# Patient Record
Sex: MALE | Race: WHITE | NOT HISPANIC OR LATINO | Employment: OTHER | RURAL
[De-identification: names, ages, dates, MRNs, and addresses within clinical notes are randomized per-mention and may not be internally consistent; named-entity substitution may affect disease eponyms.]

---

## 2017-01-24 RX ORDER — HYDROCHLOROTHIAZIDE 12.5 MG/1
TABLET ORAL
Qty: 90 TABLET | Refills: 1 | Status: SHIPPED | OUTPATIENT
Start: 2017-01-24 | End: 2017-07-10 | Stop reason: SDUPTHER

## 2017-05-10 RX ORDER — CAPTOPRIL 25 MG/1
TABLET ORAL
Qty: 180 TABLET | Refills: 1 | Status: SHIPPED | OUTPATIENT
Start: 2017-05-10 | End: 2017-12-15 | Stop reason: SDUPTHER

## 2017-06-05 PROBLEM — M19.90 DEGENERATIVE JOINT DISEASE: Chronic | Status: ACTIVE | Noted: 2017-06-05

## 2017-06-05 PROBLEM — R73.01 IMPAIRED FASTING GLUCOSE: Status: ACTIVE | Noted: 2017-06-05

## 2017-06-05 PROBLEM — I10 HYPERTENSION: Chronic | Status: ACTIVE | Noted: 2017-06-05

## 2017-06-05 PROBLEM — R97.20 ELEVATED PSA: Status: ACTIVE | Noted: 2017-06-05

## 2017-06-05 PROBLEM — R33.9 URINARY RETENTION: Status: ACTIVE | Noted: 2017-06-05

## 2017-06-05 PROBLEM — Z00.00 WELLNESS EXAMINATION: Status: ACTIVE | Noted: 2017-06-05

## 2017-06-05 PROBLEM — D69.6 THROMBOCYTOPENIA (HCC): Status: ACTIVE | Noted: 2017-06-05

## 2017-06-06 ENCOUNTER — OFFICE VISIT (OUTPATIENT)
Dept: FAMILY MEDICINE CLINIC | Facility: CLINIC | Age: 72
End: 2017-06-06

## 2017-06-06 VITALS
RESPIRATION RATE: 18 BRPM | WEIGHT: 206 LBS | HEIGHT: 74 IN | HEART RATE: 86 BPM | BODY MASS INDEX: 26.44 KG/M2 | TEMPERATURE: 98 F | SYSTOLIC BLOOD PRESSURE: 126 MMHG | DIASTOLIC BLOOD PRESSURE: 82 MMHG | OXYGEN SATURATION: 95 %

## 2017-06-06 DIAGNOSIS — M19.90 OSTEOARTHRITIS, UNSPECIFIED OSTEOARTHRITIS TYPE, UNSPECIFIED SITE: Chronic | ICD-10-CM

## 2017-06-06 DIAGNOSIS — R73.01 IMPAIRED FASTING GLUCOSE: ICD-10-CM

## 2017-06-06 DIAGNOSIS — I10 ESSENTIAL HYPERTENSION: Primary | Chronic | ICD-10-CM

## 2017-06-06 DIAGNOSIS — R33.9 URINARY RETENTION: ICD-10-CM

## 2017-06-06 PROCEDURE — 99213 OFFICE O/P EST LOW 20 MIN: CPT | Performed by: FAMILY MEDICINE

## 2017-06-06 RX ORDER — FINASTERIDE 5 MG/1
5 TABLET, FILM COATED ORAL DAILY
COMMUNITY
End: 2017-06-22 | Stop reason: SDUPTHER

## 2017-06-06 NOTE — PROGRESS NOTES
Subjective   Elver Gordon is a 72 y.o. male presenting with chief complaint of:   Chief Complaint   Patient presents with   • Hypertension   • Thrombocytopenia   • Elevated PSA       History of Present Illness :  Alone.  Has multiple chronic problems; interval appointment.  One of these problems is HTN.      The HTN has been present for years/it is chronic.  The HTN is assumed essential/without testing needed to look for other.  The HTN is controlled manifest by todays blood pressure and no home monitoring.   Degenerative Joint Disease:  This has been present for years/over a year.  It is chronic.  It causes on/off pain and joint stiffness .  There is no joint swelling.  Elevated fasting glucose: This has been present for years/over a year.  It is chronic.  It is controlled. No home accuchecks have yet been requested.  No signs hypoglycmeia manifest as syncope/near syncope or diaphoretic/sweating spisodes.   Prostatism: This has been present for years/over a year. It is chronic.  With proscar Rx he is voiding ok.  Sees urology/Zuniga.     The following portions of the patient's history were reviewed and updated as appropriate: allergies, current medications, past family history, past medical history, past social history, past surgical history and problem list.  TCC migrated if needed/reviewed.      Current Outpatient Prescriptions:   •  captopril (CAPOTEN) 25 MG tablet, Take 1 tablet by mouth  twice a day, Disp: 180 tablet, Rfl: 1  •  finasteride (PROSCAR) 5 MG tablet, Take 5 mg by mouth Daily., Disp: , Rfl:   •  hydrochlorothiazide (HYDRODIURIL) 12.5 MG tablet, Take 1 tablet by mouth  daily, Disp: 90 tablet, Rfl: 1    No Known Allergies    Review of Systems  GENERAL:  Active/slower with limits, speed, samni for age and; a lot of outdoor work. Sleep is ok; apnea denied.  ENDO:  No syncope, near or diaphoretic sweaty spells.  HEENT: No head injury or headache,  No vision change; glasses and eye exam up to date,  No  "hearing loss.  Ears without pain/drainage.  No sore throat.  No pattern nasal/sinus congestion/drainage. No epistaxis.  Occ washes his ears out.   CHEST: No chest wall tenderness or mass. No cough,  without wheeze, SOB; no hemoptysis.  CV: No chest pain, palpatations, ankle edema.  GI: No heartburn, dysphagia.  No abdominal pain, diarrhea, constipation, rectal bleeding, or melena.    Colonoscopy+polyp/[facility]/1.9.13/5y    :  Voids without dysuria, or incontience to completion.  Sees Zuniga.   ORTHO: No painful/swollen joints but various on /off sore.  Bilateral total knees.   No  sore neck or back.  No acute neck or back pain without recent injury.   NEURO: No dizziness, weakness of extremities.  No numbness/parethesias.   PSYCH: No memory loss.  Mood good; not anxious, depressed but/and not suicidal.  Tolerated stress.     No results found for this or any previous visit.    No results found for: HGBA1C    No results found for: NA, K, CL, CO2, GLUCOSE, BUN, CREATININE, CALCIUM, EGFRCLEARA    No results found for: PSA     Objective   /82 (BP Location: Left arm, Patient Position: Sitting, Cuff Size: Adult)  Pulse 86  Temp 98 °F (36.7 °C) (Oral)   Resp 18  Ht 73.5\" (186.7 cm)  Wt 206 lb (93.4 kg)  SpO2 95%  BMI 26.81 kg/m2    Physical Exam  GENERAL:  Well nourished/developed in no acute distress. Thin  SKIN: Turgor excellent, without wound, rash, lesion.  HEENT: Normal cephalic without trauma.  Pupils equal round reactive to light. Extraocular motions full without nystagmus.   External canals nonobstructive nontender without reddness. Tymphatic membranes annie with coby structures intact.   Oral cavity without growths, exudates, and moist.  Posterior pharnyx without mass, obstruction, reddness.  No thyroidmegaly, mass, tenderness, lymphadenopathy and supple.  CV: Regular rhythm.  No murmur, gallop,  edema. Posterior pulses intact.  No carotid bruits.  CHEST: No chest wall tenderness or mass.   LUNGS: " Symmetric motion with clear to auscultation.   ABD: Soft, nontender without mass.   ORTHO: Symmetric extremities without swelling/point tenderness.  Full gross range of motion.  NEURO: CN 2-12 grossly intact.  Symmetric facies. 1/4 x bicep knee equal reflexes.  UE/LE   4/5 strength throughout.  Nonfocal use extremities. Speech clear. Intact light touch with monofilament, vibratory sensation with tuning fork; equal toes/distal feet.    PSYCH: Oriented x 3.  Pleasant calm, well kept.  Purposeful/directed conservation with intact short/long gross memory.     Assessment/Plan     1. Essential hypertension  controlled  - CBC & Differential  - Comprehensive Metabolic Panel  - Lipid Panel  - TSH    2. Impaired fasting glucose  watched  - CBC & Differential  - Comprehensive Metabolic Panel  - Lipid Panel  - TSH    3. Osteoarthritis, unspecified osteoarthritis type, unspecified site  Mild;diffuse  - CBC & Differential  - Comprehensive Metabolic Panel    4. Urinary retention  proscar   - PSA      Rx: reviewed.  Changes if above; same  LAB: reviewed/above, and if orders: today/yearly  Wrap-up/other instructions  Regular cardio exercise something everyone should consider and try to do; discussed  Normal weight a goal for everyone; discussed  Healthy diet helpful for weight management, illness prevention; discussed  If over 50-screening exams include men PSA/rectal exam, women mammograms, and  everyone colonoscopy screening for colon cancer.   There are no Patient Instructions on file for this visit.    Follow up: Return for lab today; 12m ro.

## 2017-06-07 LAB
ALBUMIN SERPL-MCNC: 4.2 G/DL (ref 3.5–5)
ALBUMIN/GLOB SERPL: 1.5 G/DL (ref 1.1–2.5)
ALP SERPL-CCNC: 62 U/L (ref 24–120)
ALT SERPL-CCNC: 28 U/L (ref 0–54)
AST SERPL-CCNC: 25 U/L (ref 7–45)
BASOPHILS # BLD AUTO: 0.02 10*3/MM3 (ref 0–0.2)
BASOPHILS NFR BLD AUTO: 0.2 % (ref 0–2)
BILIRUB SERPL-MCNC: 0.7 MG/DL (ref 0.1–1)
BUN SERPL-MCNC: 17 MG/DL (ref 5–21)
BUN/CREAT SERPL: 16.3 (ref 7–25)
CALCIUM SERPL-MCNC: 10.1 MG/DL (ref 8.4–10.4)
CHLORIDE SERPL-SCNC: 100 MMOL/L (ref 98–110)
CHOLEST SERPL-MCNC: 157 MG/DL (ref 130–200)
CO2 SERPL-SCNC: 26 MMOL/L (ref 24–31)
CREAT SERPL-MCNC: 1.04 MG/DL (ref 0.5–1.4)
EOSINOPHIL # BLD AUTO: 0.1 10*3/MM3 (ref 0–0.7)
EOSINOPHIL NFR BLD AUTO: 1.1 % (ref 0–4)
ERYTHROCYTE [DISTWIDTH] IN BLOOD BY AUTOMATED COUNT: 13.7 % (ref 12–15)
GLOBULIN SER CALC-MCNC: 2.8 GM/DL
GLUCOSE SERPL-MCNC: 106 MG/DL (ref 70–100)
HCT VFR BLD AUTO: 45.6 % (ref 40–52)
HDLC SERPL-MCNC: 43 MG/DL
HGB BLD-MCNC: 14.9 G/DL (ref 14–18)
IMM GRANULOCYTES # BLD: 0.02 10*3/MM3 (ref 0–0.03)
IMM GRANULOCYTES NFR BLD: 0.2 % (ref 0–5)
LDLC SERPL CALC-MCNC: 100 MG/DL (ref 0–99)
LYMPHOCYTES # BLD AUTO: 1.4 10*3/MM3 (ref 0.72–4.86)
LYMPHOCYTES NFR BLD AUTO: 15.2 % (ref 15–45)
MCH RBC QN AUTO: 29.3 PG (ref 28–32)
MCHC RBC AUTO-ENTMCNC: 32.7 G/DL (ref 33–36)
MCV RBC AUTO: 89.6 FL (ref 82–95)
MONOCYTES # BLD AUTO: 0.77 10*3/MM3 (ref 0.19–1.3)
MONOCYTES NFR BLD AUTO: 8.3 % (ref 4–12)
NEUTROPHILS # BLD AUTO: 6.92 10*3/MM3 (ref 1.87–8.4)
NEUTROPHILS NFR BLD AUTO: 75 % (ref 39–78)
PLATELET # BLD AUTO: 201 10*3/MM3 (ref 130–400)
POTASSIUM SERPL-SCNC: 4.1 MMOL/L (ref 3.5–5.3)
PROT SERPL-MCNC: 7 G/DL (ref 6.3–8.7)
PSA SERPL-MCNC: 2.41 NG/ML (ref 0–4)
RBC # BLD AUTO: 5.09 10*6/MM3 (ref 4.8–5.9)
SODIUM SERPL-SCNC: 137 MMOL/L (ref 135–145)
TRIGL SERPL-MCNC: 71 MG/DL (ref 0–149)
TSH SERPL DL<=0.005 MIU/L-ACNC: 2.26 MIU/ML (ref 0.47–4.68)
VLDLC SERPL CALC-MCNC: 14.2 MG/DL
WBC # BLD AUTO: 9.23 10*3/MM3 (ref 4.8–10.8)

## 2017-06-22 ENCOUNTER — OFFICE VISIT (OUTPATIENT)
Dept: UROLOGY | Facility: CLINIC | Age: 72
End: 2017-06-22

## 2017-06-22 VITALS
HEIGHT: 73 IN | SYSTOLIC BLOOD PRESSURE: 134 MMHG | DIASTOLIC BLOOD PRESSURE: 82 MMHG | BODY MASS INDEX: 27.2 KG/M2 | WEIGHT: 205.2 LBS | TEMPERATURE: 98.1 F

## 2017-06-22 DIAGNOSIS — R97.20 ELEVATED PROSTATE SPECIFIC ANTIGEN (PSA): Primary | ICD-10-CM

## 2017-06-22 DIAGNOSIS — N40.1 BPH (BENIGN PROSTATIC HYPERTROPHY) WITH URINARY OBSTRUCTION: ICD-10-CM

## 2017-06-22 DIAGNOSIS — N13.8 BPH (BENIGN PROSTATIC HYPERTROPHY) WITH URINARY OBSTRUCTION: ICD-10-CM

## 2017-06-22 LAB
BILIRUB BLD-MCNC: NEGATIVE MG/DL
CLARITY, POC: CLEAR
COLOR UR: YELLOW
GLUCOSE UR STRIP-MCNC: NEGATIVE MG/DL
KETONES UR QL: NEGATIVE
LEUKOCYTE EST, POC: ABNORMAL
NITRITE UR-MCNC: NEGATIVE MG/ML
PH UR: 5 [PH] (ref 5–8)
PROT UR STRIP-MCNC: NEGATIVE MG/DL
RBC # UR STRIP: NEGATIVE /UL
SP GR UR: 1.02 (ref 1–1.03)
UROBILINOGEN UR QL: NORMAL

## 2017-06-22 PROCEDURE — 99213 OFFICE O/P EST LOW 20 MIN: CPT | Performed by: UROLOGY

## 2017-06-22 PROCEDURE — 81003 URINALYSIS AUTO W/O SCOPE: CPT | Performed by: UROLOGY

## 2017-06-22 RX ORDER — FINASTERIDE 5 MG/1
5 TABLET, FILM COATED ORAL DAILY
Qty: 90 TABLET | Refills: 3 | Status: SHIPPED | OUTPATIENT
Start: 2017-06-22 | End: 2018-04-27 | Stop reason: SDUPTHER

## 2017-06-22 NOTE — PROGRESS NOTES
Subjective    Mr. Gordon is 72 y.o. male    Chief Complaint: Elevated PSA    History of Present Illness     Elevated PSA  Patient is here with an elevated PSA. The PSA was drawn1 week(s). He does not have a family history of prostate cancer. His AUA Symptom Score is 3 /35. Voiding symptoms include Incomplete emptying, Intermittency and Weakened stream. Denies Frequency, Urgency, Straining and Nocturia. Voiding symptoms began several years  . These have been gradual in onset. Negative X 2 showed inflammation, last PSA 7.3.  Previous PSA values are :   Lab Results   Component Value Date    PSA 2.410 06/06/2017        Benign Prostatic Hypertrophy  Patient complains of lower urinary tract symptoms. He reports incomplete emptying, intermittency and weak stream. He denies frequency, nocturia two times a night, straining and urgency. Patient states symptoms are of mild severity. Onset of symptoms was several years ago and was gradual in onset. His AUA Symptom Score is, 3/35.He reports a history of no complicating symptoms. He denies flank pain, gross hematuria, kidney stones and recurrent UTI.  Patient has tried 5 alpha reductase inhibitors with improvement. Last PSA was 2.4 .            The following portions of the patient's history were reviewed and updated as appropriate: allergies, current medications, past family history, past medical history, past social history, past surgical history and problem list.    Review of Systems   Constitutional: Negative for chills and fever.   Gastrointestinal: Negative for abdominal pain, anal bleeding and blood in stool.   Genitourinary: Negative for flank pain and hematuria.         Current Outpatient Prescriptions:   •  captopril (CAPOTEN) 25 MG tablet, Take 1 tablet by mouth  twice a day, Disp: 180 tablet, Rfl: 1  •  finasteride (PROSCAR) 5 MG tablet, Take 1 tablet by mouth Daily., Disp: 90 tablet, Rfl: 3  •  hydrochlorothiazide (HYDRODIURIL) 12.5 MG tablet, Take 1 tablet by mouth   "daily, Disp: 90 tablet, Rfl: 1    Past Medical History:   Diagnosis Date   • Elevated PSA    • Hypertension        Past Surgical History:   Procedure Laterality Date   • CHOLECYSTECTOMY     • KNEE SURGERY         Social History     Social History   • Marital status:      Spouse name: N/A   • Number of children: N/A   • Years of education: N/A     Social History Main Topics   • Smoking status: Former Smoker     Packs/day: 1.50     Years: 15.00     Types: Cigarettes     Start date: 1962     Quit date: 6/6/1987   • Smokeless tobacco: Never Used   • Alcohol use No   • Drug use: No   • Sexual activity: Defer     Other Topics Concern   • None     Social History Narrative       Family History   Problem Relation Age of Onset   • No Known Problems Father    • No Known Problems Mother        Objective    /82  Temp 98.1 °F (36.7 °C)  Ht 73\" (185.4 cm)  Wt 205 lb 3.2 oz (93.1 kg)  BMI 27.07 kg/m2    Physical Exam   Constitutional: He is oriented to person, place, and time. He appears well-developed and well-nourished.   Pulmonary/Chest: Effort normal.   Abdominal: Soft. He exhibits no distension and no mass. There is no tenderness. There is no rebound and no guarding. No hernia.   Genitourinary: Penis normal. Rectal exam shows no mass, no tenderness and anal tone normal. Enlarged: for the age of the patient. Right testis shows no mass, no swelling and no tenderness. Left testis shows no mass, no swelling and no tenderness. No hypospadias. No discharge found.   Genitourinary Comments:  The urethral meatus normal in position without evidence of stricture. Epididymis without mass or tenderness. Vas Deferens is palpably normal.Anus and perineum without mass or tenderness. The prostate is approximately 35 ml. It is Symmetric, with a Soft consistency. There are no nodules present. . The seminal vesicles are Not palpable due to the size of the prostate.     Neurological: He is alert and oriented to person, place, and " time.   Vitals reviewed.          Results for orders placed or performed in visit on 06/22/17   POC Urinalysis Dipstick, Automated   Result Value Ref Range    Color Yellow Yellow, Straw, Dark Yellow, Ladan    Clarity, UA Clear Clear    Glucose, UA Negative Negative, 1000 mg/dL (3+) mg/dL    Bilirubin Negative Negative    Ketones, UA Negative Negative    Specific Gravity  1.020 1.005 - 1.030    Blood, UA Negative Negative    pH, Urine 5.0 5.0 - 8.0    Protein, POC Negative Negative mg/dL    Urobilinogen, UA Normal Normal    Leukocytes Trace (A) Negative    Nitrite, UA Negative Negative     Assessment and Plan    Elver was seen today for elevated psa.    Diagnoses and all orders for this visit:    Elevated prostate specific antigen (PSA)  -     POC Urinalysis Dipstick, Automated  -     PSA, Total & Free; Future    BPH (benign prostatic hypertrophy) with urinary obstruction  -     finasteride (PROSCAR) 5 MG tablet; Take 1 tablet by mouth Daily.          Patient with a history of 2 negative biopsies both showed inflammation.  His last biopsy was for a PSA of 7.2.  I am seeing him 1 year ago his PSA was 3.  Is down to 2.4.  Corrected for finasteride it is 4.8.  He has somewhat of a zigzag pattern to his PSA we will continue yearly follow-up with PSA and percent free.

## 2017-07-10 RX ORDER — HYDROCHLOROTHIAZIDE 12.5 MG/1
TABLET ORAL
Qty: 90 TABLET | Refills: 1 | Status: SHIPPED | OUTPATIENT
Start: 2017-07-10 | End: 2017-12-15 | Stop reason: SDUPTHER

## 2017-07-20 ENCOUNTER — TELEPHONE (OUTPATIENT)
Dept: FAMILY MEDICINE CLINIC | Facility: CLINIC | Age: 72
End: 2017-07-20

## 2017-07-20 ENCOUNTER — OFFICE VISIT (OUTPATIENT)
Dept: FAMILY MEDICINE CLINIC | Facility: CLINIC | Age: 72
End: 2017-07-20

## 2017-07-20 VITALS
BODY MASS INDEX: 27.17 KG/M2 | HEIGHT: 73 IN | SYSTOLIC BLOOD PRESSURE: 120 MMHG | OXYGEN SATURATION: 97 % | DIASTOLIC BLOOD PRESSURE: 92 MMHG | HEART RATE: 98 BPM | RESPIRATION RATE: 18 BRPM | WEIGHT: 205 LBS | TEMPERATURE: 98.4 F

## 2017-07-20 DIAGNOSIS — R42 VERTIGO: ICD-10-CM

## 2017-07-20 DIAGNOSIS — I10 ESSENTIAL HYPERTENSION: Chronic | ICD-10-CM

## 2017-07-20 DIAGNOSIS — R73.01 IMPAIRED FASTING GLUCOSE: Primary | ICD-10-CM

## 2017-07-20 PROCEDURE — 99213 OFFICE O/P EST LOW 20 MIN: CPT | Performed by: FAMILY MEDICINE

## 2017-07-20 NOTE — TELEPHONE ENCOUNTER
Pt called and states he has been having really bad dizzy spells when he is getting out of a chair and would like seen [phone]/ [phone]

## 2017-07-26 ENCOUNTER — TELEPHONE (OUTPATIENT)
Dept: FAMILY MEDICINE CLINIC | Facility: CLINIC | Age: 72
End: 2017-07-26

## 2017-07-26 PROBLEM — F40.240 CLAUSTROPHOBIA: Status: ACTIVE | Noted: 2017-07-26

## 2017-07-26 RX ORDER — ALPRAZOLAM 0.5 MG/1
TABLET ORAL
Qty: 2 TABLET | Refills: 0 | OUTPATIENT
Start: 2017-07-26 | End: 2017-08-29

## 2017-07-26 NOTE — TELEPHONE ENCOUNTER
"Vm \"I has a MRI scheduled this morning and I was in it for 15 minutes and had to get out, I am going to have to get something to take like Valium? They said I have to be in there for 40 min with contrast and 40 min without contrast. I just cant do it without taking something\"  "

## 2017-08-02 ENCOUNTER — TELEPHONE (OUTPATIENT)
Dept: FAMILY MEDICINE CLINIC | Facility: CLINIC | Age: 72
End: 2017-08-02

## 2017-08-02 ENCOUNTER — HOSPITAL ENCOUNTER (OUTPATIENT)
Dept: MRI IMAGING | Facility: HOSPITAL | Age: 72
Discharge: HOME | End: 2017-08-02
Attending: FAMILY MEDICINE | Admitting: FAMILY MEDICINE

## 2017-08-02 LAB — CREAT BLDA-MCNC: 1.1 MG/DL (ref 0.6–1.3)

## 2017-08-02 PROCEDURE — 70553 MRI BRAIN STEM W/O & W/DYE: CPT

## 2017-08-02 PROCEDURE — 82565 ASSAY OF CREATININE: CPT

## 2017-08-02 PROCEDURE — A9577 INJ MULTIHANCE: HCPCS

## 2017-08-02 PROCEDURE — 0 GADOBENATE DIMEGLUMINE 529 MG/ML SOLUTION

## 2017-08-29 ENCOUNTER — PROCEDURE VISIT (OUTPATIENT)
Dept: OTOLARYNGOLOGY | Facility: CLINIC | Age: 72
End: 2017-08-29

## 2017-08-29 ENCOUNTER — OFFICE VISIT (OUTPATIENT)
Dept: OTOLARYNGOLOGY | Facility: CLINIC | Age: 72
End: 2017-08-29

## 2017-08-29 VITALS
DIASTOLIC BLOOD PRESSURE: 85 MMHG | BODY MASS INDEX: 27.51 KG/M2 | HEIGHT: 73 IN | HEART RATE: 55 BPM | TEMPERATURE: 96.8 F | SYSTOLIC BLOOD PRESSURE: 142 MMHG | WEIGHT: 207.6 LBS | RESPIRATION RATE: 16 BRPM

## 2017-08-29 DIAGNOSIS — R42 VERTIGO: ICD-10-CM

## 2017-08-29 DIAGNOSIS — H90.5 SNHL (SENSORINEURAL HEARING LOSS): Primary | ICD-10-CM

## 2017-08-29 DIAGNOSIS — H61.23 BILATERAL IMPACTED CERUMEN: ICD-10-CM

## 2017-08-29 DIAGNOSIS — H81.10 BPPV (BENIGN PAROXYSMAL POSITIONAL VERTIGO), UNSPECIFIED LATERALITY: ICD-10-CM

## 2017-08-29 PROCEDURE — 99203 OFFICE O/P NEW LOW 30 MIN: CPT | Performed by: NURSE PRACTITIONER

## 2017-08-29 PROCEDURE — 69210 REMOVE IMPACTED EAR WAX UNI: CPT | Performed by: NURSE PRACTITIONER

## 2017-08-29 PROCEDURE — 92550 TYMPANOMETRY & REFLEX THRESH: CPT | Performed by: AUDIOLOGIST

## 2017-08-29 NOTE — PATIENT INSTRUCTIONS
Call for problems or worsening symptoms    Cerumen was removed    Avoid loud noise exposure. Protect hearing in with extreme loud noise.   Recommend fan, sound machine, white noise from TV for tinnitus masking. Avoid excessive caffeine, decrease stress level, monitor BP regularly, routine sleep schedule. Low Sodium Diet.   Tinnitus Handout   Hearing loss handout   Appt with hearing aid specialist for hearing aid fitting when patient so desires.   Call for change or worsening symptoms not controlled by current treatment regimen.

## 2017-08-29 NOTE — PROGRESS NOTES
Procedure Note    Preprocedure Diagnosis:  Cerumen Impaction    Postprocedure Diagnosis:  Cerumen Impaction      PROCEDURE NOTE    PROCEDURE:cerumen removal .     ANESTHESIA:None     REASON FOR THE PROCEDURE:The patient presented with cerumen impaction .   The patient verbally consented to intervention after a full discussion of risks, benefits, and alternatives. No guarantees were made or implied.    DETAILS OF THE PROCEDURE:The patient was seated upright in the exam room chair. The open head otoscope was used to visualize the ear canal and tympanic membrane. Cerumen was then removed from the both ears using a suction

## 2017-08-29 NOTE — PROGRESS NOTES
YOB: 1945  Location: [facility]  Location Address: [address] [place] [address],  [address], [address] [place] [address]  Location Phone: [phone]    Chief Complaint   Patient presents with   • Dizziness       History of Present Illness  Elver Gordon is a 72 y.o. male.  Elver Gordon is here for evaluation of ENT complaints. The patient has had problems with vertigo, dizziness and decreased hearing  The symptoms are not localized to a particular location. The patient has had improving symptoms. The symptoms have been present for the last several weeks The symptoms are aggravated by  no identifiable factors. The symptoms are improved by cawthorne excercises.   Hx of  loud noise exposure.  He has had some problems with cerumen impaction.  He had an episode of vertigo that improved with dizziness exercises which sounded like BPPV.  He washes his ears out at home.      Procedure visit     2017  [facility]    Viola Hicks CCC-EMI   Audiology    Vertigo   Dx    Dizziness   Reason for visit    Progress Notes         CASE HISTORY DETAILS   Mr. Gordon presented to the clinic this date with complaints of dizziness. He reported onset of symptoms several weeks ago. The dizziness was described as a brief spinning sensation when turning in bed and standing quickly. He was given Michaels-Daroff exercises by his PCP and noted dizziness has resolved. He further reported left ear pain. He noted history of cerumen occlusion in the left ear and history of ear infection if he gets water in his ears. He also noted decreased hearing bilaterally, worse in the left ear. He has history of noise exposure and noted more exposure on the left side.       SUMMARY   RIGHT  ¨ Otoscopy revealed clear EAC/Unremarkable TM.  ¨ Severe high frequency sensorineural hearing loss.  ¨ Immitance measures are consistent with normal middle ear function.     LEFT  ¨ Otoscopy revealed non-occluding cerumen/abnormal  TM.  ¨ Profound high frequency sensorineural hearing loss.  ¨ Immitance measures are consistent with negative middle ear pressure.     A [place] Hallpike was not performed. Pt symptoms were consistent with BPPV and per report seem to have resolved with successful canalith repositioning.     RECOMMENDATIONS   Results of today's evaluation were discussed with Mr. Gordon and the following recommendations were made:  1. ENT evaluation today as scheduled.  2. Monitor hearing yearly.  3. HAE if so desired.  4. RTC for further evaluation should dizziness return and not be resolved by Michaels-Daroff exercises.     AUDIOGRAM AND IMMITANCE        Espinoza Patel, [facility]  Audiologist             Past Medical History:   Diagnosis Date   • Elevated PSA    • Hypertension        Past Surgical History:   Procedure Laterality Date   • CHOLECYSTECTOMY     • KNEE SURGERY           Current Outpatient Prescriptions:   •  captopril (CAPOTEN) 25 MG tablet, Take 1 tablet by mouth  twice a day, Disp: 180 tablet, Rfl: 1  •  finasteride (PROSCAR) 5 MG tablet, Take 1 tablet by mouth Daily., Disp: 90 tablet, Rfl: 3  •  hydrochlorothiazide (HYDRODIURIL) 12.5 MG tablet, Take 1 tablet by mouth  daily, Disp: 90 tablet, Rfl: 1    Review of patient's allergies indicates no known allergies.    Family History   Problem Relation Age of Onset   • No Known Problems Father    • No Known Problems Mother        Social History     Social History   • Marital status:      Spouse name: Arlin   • Number of children: 3   • Years of education: 13.5     Occupational History   • retired      [employer]     Social History Main Topics   • Smoking status: Former Smoker     Packs/day: 1.50     Years: 15.00     Types: Cigarettes     Start date: 1962     Quit date: 6/6/1987   • Smokeless tobacco: Never Used   • Alcohol use No   • Drug use: No   • Sexual activity: Defer     Other Topics Concern   • Not on file     Social History Narrative       Review of  Systems   Constitutional: Negative.    HENT:        SEE HPI   Eyes: Negative.    Respiratory: Negative.    Cardiovascular: Negative.    Gastrointestinal: Negative.    Endocrine: Negative.    Genitourinary: Negative.    Musculoskeletal: Negative.    Skin: Negative.    Allergic/Immunologic: Negative.    Neurological: Negative.    Hematological: Negative.    Psychiatric/Behavioral: Negative.        Vitals:    08/29/17 1023   BP: 142/85   Pulse: 55   Resp: 16   Temp: 96.8 °F (36 °C)       Objective     Physical Exam  CONSTITUTIONAL: well nourished, alert, oriented, in no acute distress     COMMUNICATION AND VOICE: able to communicate normally, normal voice quality    HEAD: normocephalic, no lesions, atraumatic, no tenderness, no masses     FACE: appearance normal, no lesions, no tenderness, no deformities, facial motion symmetric    SALIVARY GLANDS: parotid glands with no tenderness, no swelling, no masses, submandibular glands with normal size, nontender    EYES: ocular motility normal, eyelids normal, orbits normal, no proptosis, conjunctiva normal , pupils equal, round     EARS:  Hearing: response to conversational voice normal bilaterally   External Ears: auricles without lesions  Otoscopic: tympanic membrane appearance normal, no lesions, no perforation, normal mobility, no fluid Cerumen in EACs bilaterally - removed with suction    NOSE:  External Nose: structure normal, no tenderness on palpation, no nasal discharge, no lesions, no evidence of trauma, nostrils patent   Intranasal Exam: nasal mucosa normal, vestibule within normal limits, inferior turbinate normal, nasal septum midline       ORAL:  Lips: upper and lower lips without lesion   Teeth: dentition within normal limits for age   Gums: gingivae healthy   Oral Mucosa: oral mucosa normal, no mucosal lesions   Floor of Mouth: Warthin’s duct patent, mucosa normal  Tongue: lingual mucosa normal without lesions, normal tongue mobility   Palate: soft and hard  palates with normal mucosa and structure  Oropharynx: oropharyngeal mucosa normal    NECK: neck appearance normal, no mass,  noted without erythema or tenderness    THYROID: no overt thyromegaly, no tenderness, nodules or mass present on palpation, position midline     LYMPH NODES: no lymphadenopathy    CHEST/RESPIRATORY: respiratory effort normal,    CARDIOVASCULAR:  extremities without cyanosis or edema      NEUROLOGIC/PSYCHIATRIC: oriented to time, place and person, mood normal, affect appropriate, CN II-XII intact grossly    Assessment/Plan   Elver was seen today for dizziness.    Diagnoses and all orders for this visit:    SNHL (sensorineural hearing loss)    Bilateral impacted cerumen    BPPV (benign paroxysmal positional vertigo), unspecified laterality      * Surgery not found *  No orders of the defined types were placed in this encounter.    Return if symptoms worsen or fail to improve.       Patient Instructions   Call for problems or worsening symptoms    Cerumen was removed    Avoid loud noise exposure. Protect hearing in with extreme loud noise.   Recommend fan, sound machine, white noise from TV for tinnitus masking. Avoid excessive caffeine, decrease stress level, monitor BP regularly, routine sleep schedule. Low Sodium Diet.   Tinnitus Handout   Hearing loss handout   Appt with hearing aid specialist for hearing aid fitting when patient so desires.   Call for change or worsening symptoms not controlled by current treatment regimen.

## 2017-08-29 NOTE — PROGRESS NOTES
CASE HISTORY DETAILS   Mr. Gordon presented to the clinic this date with complaints of dizziness. He reported onset of symptoms several weeks ago. The dizziness was described as a brief spinning sensation when turning in bed and standing quickly. He was given Michaels-Daroff exercises by his PCP and noted dizziness has resolved. He further reported left ear pain. He noted history of cerumen occlusion in the left ear and history of ear infection if he gets water in his ears. He also noted decreased hearing bilaterally, worse in the left ear. He has history of noise exposure and noted more exposure on the left side.      SUMMARY   RIGHT  · Otoscopy revealed clear EAC/Unremarkable TM.  · Severe high frequency sensorineural hearing loss.  · Immitance measures are consistent with normal middle ear function.    LEFT  · Otoscopy revealed non-occluding cerumen/abnormal TM.  · Profound high frequency sensorineural hearing loss.  · Immitance measures are consistent with negative middle ear pressure.    A [place] Hallpike was not performed. Pt symptoms were consistent with BPPV and per report seem to have resolved with successful canalith repositioning.    RECOMMENDATIONS   Results of today's evaluation were discussed with Mr. Gordon and the following recommendations were made:  1. ENT evaluation today as scheduled.  2. Monitor hearing yearly.  3. HAE if so desired.  4. RTC for further evaluation should dizziness return and not be resolved by Michaels-Daroff exercises.    AUDIOGRAM AND IMMITANCE       Espinoza Patel, CCC-A  Audiologist

## 2017-12-15 RX ORDER — HYDROCHLOROTHIAZIDE 12.5 MG/1
TABLET ORAL
Qty: 90 TABLET | Refills: 2 | Status: SHIPPED | OUTPATIENT
Start: 2017-12-15 | End: 2018-09-27 | Stop reason: SDUPTHER

## 2017-12-15 RX ORDER — CAPTOPRIL 25 MG/1
TABLET ORAL
Qty: 180 TABLET | Refills: 2 | Status: SHIPPED | OUTPATIENT
Start: 2017-12-15 | End: 2018-09-27 | Stop reason: SDUPTHER

## 2018-02-07 ENCOUNTER — OFFICE VISIT (OUTPATIENT)
Dept: GASTROENTEROLOGY | Facility: CLINIC | Age: 73
End: 2018-02-07

## 2018-02-07 VITALS
SYSTOLIC BLOOD PRESSURE: 128 MMHG | OXYGEN SATURATION: 97 % | BODY MASS INDEX: 26.69 KG/M2 | HEART RATE: 115 BPM | HEIGHT: 74 IN | WEIGHT: 208 LBS | DIASTOLIC BLOOD PRESSURE: 82 MMHG

## 2018-02-07 DIAGNOSIS — I10 ESSENTIAL HYPERTENSION: ICD-10-CM

## 2018-02-07 DIAGNOSIS — Z86.010 HX OF ADENOMATOUS COLONIC POLYPS: Primary | ICD-10-CM

## 2018-02-07 PROCEDURE — S0260 H&P FOR SURGERY: HCPCS | Performed by: CLINICAL NURSE SPECIALIST

## 2018-02-07 NOTE — PROGRESS NOTES
Elver Gordon  1945 2/7/2018  Chief Complaint   Patient presents with   • Colonoscopy     Subjective   HPI  Elver Gordon is a 72 y.o. male who presents as a referral for preventative maintenance. He has no complaints of nausea or vomiting. No change in bowels. No wt loss. No BRBPR. No melena. There is no family hx for colon cancer. No abdominal pain.  Past Medical History:   Diagnosis Date   • Elevated PSA    • Hx of colonic polyps    • Hypertension    • Hypertriglyceridemia      Past Surgical History:   Procedure Laterality Date   • CHOLECYSTECTOMY     • COLONOSCOPY W/ POLYPECTOMY  01/09/2013    2 Tubular adenomas at 45cm and 70 cm repeat exam in 5 years   • KNEE SURGERY       Outpatient Prescriptions Marked as Taking for the 2/7/18 encounter (Office Visit) with DARLIN Boucher   Medication Sig Dispense Refill   • captopril (CAPOTEN) 25 MG tablet TAKE 1 TABLET BY MOUTH  TWICE A  tablet 2   • finasteride (PROSCAR) 5 MG tablet Take 1 tablet by mouth Daily. 90 tablet 3   • hydrochlorothiazide (HYDRODIURIL) 12.5 MG tablet TAKE 1 TABLET BY MOUTH  DAILY 90 tablet 2     No Known Allergies  Social History     Social History   • Marital status:      Spouse name: Arlin   • Number of children: 3   • Years of education: 13.5     Occupational History   • retired      [employer]     Social History Main Topics   • Smoking status: Former Smoker     Packs/day: 1.50     Years: 15.00     Types: Cigarettes     Start date: 1962     Quit date: 6/6/1987   • Smokeless tobacco: Never Used   • Alcohol use No   • Drug use: No   • Sexual activity: Defer     Other Topics Concern   • Not on file     Social History Narrative     Family History   Problem Relation Age of Onset   • No Known Problems Father    • No Known Problems Mother    • Colon polyps Brother    • Colon cancer Neg Hx      Health Maintenance   Topic Date Due   • TDAP/TD VACCINES (1 - Tdap) 05/02/1964   • PNEUMOCOCCAL VACCINES (65+  "LOW/MEDIUM RISK) (2 of 2 - PPSV23) 05/02/2010   • HEPATITIS C SCREENING  06/05/2017   • MEDICARE ANNUAL WELLNESS  06/05/2017   • AAA SCREEN (ONE-TIME)  06/06/2017   • LIPID PANEL  06/06/2018   • COLONOSCOPY  01/09/2023   • INFLUENZA VACCINE  Completed   • ZOSTER VACCINE  Completed       REVIEW OF SYSTEMS  General: well appearing, no fever chills or sweats, no unexplained wt loss  HEENT: no acute visual or hearing disturbances  Cardiovascular: No chest pain or palpitations  Pulmonary: No shortness of breath, coughing, wheezing or hemoptysis  : No burning, urgency, hematuria, or dysuria  Musculoskeletal: No joint pain or stiffness  Peripheral: no edema  Skin: No lesions or rashes  Neuro: No dizziness, headaches, stroke, syncope  Endocrine: No hot or cold intolerances  Hematological: No blood dyscrasias    Objective   Vitals:    02/07/18 1235   BP: 128/82   Pulse: 115   SpO2: 97%   Weight: 94.3 kg (208 lb)   Height: 186.7 cm (73.5\")     Body mass index is 27.07 kg/(m^2).  Patient's BMI is within normal parameters. No follow-up required.      PHYSICAL EXAM  General: age appropriate well nourished well appearing, no acute distress  Head: normocephalic and atraumatic  Global assessment-supple  Neck-No JVD noted, no lymphadenopathy  Pulmonary-clear to auscultation bilaterally, normal respiratory effort  Cardiovascular-normal rate and rhythm, normal heart sounds, S1 and S2 noted  Abdomen-soft, non tender, non distended, normal bowel sounds all 4 quadrants, no hepatosplenomegaly noted  Extremities-No clubbing cyanosis or edema  Neuro-Non focal, converses appropriately, awake, alert, oriented    Assessment/Plan     Elver was seen today for colonoscopy.    Diagnoses and all orders for this visit:    Hx of adenomatous colonic polyps  Comments:  Colonoscopy 1/10/2013 tubular adenoma 45cm; hyperplastic 70cm fragment of tubular adenoma  Orders:  -     polyethylene glycol (GoLYTELY) 236 g solution; Take as directed by office " instructions.  -     Case Request; Standing  -     Implement Anesthesia Orders Day of Procedure; Standing  -     Obtain Informed Consent; Standing  -     Verify bowel prep was successful; Standing  -     Case Request    Essential hypertension  Comments:  cont BP medication the day of procedure      Problem List Items Addressed This Visit        Cardiovascular and Mediastinum    Hypertension (Chronic)       Other    Hx of adenomatous colonic polyps - Primary    Overview     Colonoscopy 1/10/2013 tubular adenoma 45cm; hyperplastic 70cm fragment of tubular adenoma         Relevant Medications    polyethylene glycol (GoLYTELY) 236 g solution    Other Relevant Orders    Case Request (Completed)            COLONOSCOPY WITH ANESTHESIA (N/A)  Body mass index is 27.07 kg/(m^2).    Patient instructions on prep prior to procedure provided to the patient.    All risks, benefits, alternatives, and indications of colonoscopy procedure have been discussed with the patient. Risks to include perforation of the colon requiring possible surgery or colostomy, risk of bleeding from biopsies or removal of colon tissue, possibility of missing a colon polyp or cancer, or adverse drug reaction.  Benefits to include the diagnosis and management of disease of the colon and rectum. Alternatives to include barium enema, radiographic evaluation, lab testing or no intervention. Pt verbalizes understanding and agrees.     Fide Garcia, APRN  2/7/2018  12:51 PM

## 2018-04-02 ENCOUNTER — OUTSIDE FACILITY SERVICE (OUTPATIENT)
Dept: GASTROENTEROLOGY | Facility: CLINIC | Age: 73
End: 2018-04-02

## 2018-04-02 PROCEDURE — 45385 COLONOSCOPY W/LESION REMOVAL: CPT | Performed by: INTERNAL MEDICINE

## 2018-04-02 PROCEDURE — 88305 TISSUE EXAM BY PATHOLOGIST: CPT | Performed by: INTERNAL MEDICINE

## 2018-04-03 ENCOUNTER — LAB REQUISITION (OUTPATIENT)
Dept: LAB | Facility: HOSPITAL | Age: 73
End: 2018-04-03

## 2018-04-03 DIAGNOSIS — Z00.00 ENCOUNTER FOR GENERAL ADULT MEDICAL EXAMINATION WITHOUT ABNORMAL FINDINGS: ICD-10-CM

## 2018-04-04 LAB
CYTO UR: NORMAL
LAB AP CASE REPORT: NORMAL
LAB AP CLINICAL INFORMATION: NORMAL
Lab: NORMAL
PATH REPORT.FINAL DX SPEC: NORMAL
PATH REPORT.GROSS SPEC: NORMAL

## 2018-04-27 DIAGNOSIS — N40.1 BENIGN PROSTATIC HYPERPLASIA WITH URINARY OBSTRUCTION: ICD-10-CM

## 2018-04-27 DIAGNOSIS — N13.8 BENIGN PROSTATIC HYPERPLASIA WITH URINARY OBSTRUCTION: ICD-10-CM

## 2018-04-27 RX ORDER — FINASTERIDE 5 MG/1
5 TABLET, FILM COATED ORAL DAILY
Qty: 90 TABLET | Refills: 0 | Status: SHIPPED | OUTPATIENT
Start: 2018-04-27 | End: 2018-06-28 | Stop reason: SDUPTHER

## 2018-05-21 DIAGNOSIS — R97.20 ELEVATED PROSTATE SPECIFIC ANTIGEN (PSA): ICD-10-CM

## 2018-06-18 DIAGNOSIS — R42 VERTIGO: ICD-10-CM

## 2018-06-18 DIAGNOSIS — M19.90 OSTEOARTHRITIS, UNSPECIFIED OSTEOARTHRITIS TYPE, UNSPECIFIED SITE: Chronic | ICD-10-CM

## 2018-06-18 DIAGNOSIS — R73.01 IMPAIRED FASTING GLUCOSE: ICD-10-CM

## 2018-06-18 DIAGNOSIS — Z86.010 HX OF ADENOMATOUS COLONIC POLYPS: ICD-10-CM

## 2018-06-18 DIAGNOSIS — Z01.89 LABORATORY TEST: ICD-10-CM

## 2018-06-18 DIAGNOSIS — F40.240 CLAUSTROPHOBIA: ICD-10-CM

## 2018-06-18 DIAGNOSIS — I10 ESSENTIAL HYPERTENSION: Primary | Chronic | ICD-10-CM

## 2018-06-18 DIAGNOSIS — Z00.00 WELLNESS EXAMINATION: ICD-10-CM

## 2018-06-18 DIAGNOSIS — E55.9 VITAMIN D DEFICIENCY: ICD-10-CM

## 2018-06-18 DIAGNOSIS — D69.6 THROMBOCYTOPENIA (HCC): ICD-10-CM

## 2018-06-18 DIAGNOSIS — R97.20 ELEVATED PSA: ICD-10-CM

## 2018-06-19 ENCOUNTER — OFFICE VISIT (OUTPATIENT)
Dept: FAMILY MEDICINE CLINIC | Facility: CLINIC | Age: 73
End: 2018-06-19

## 2018-06-19 VITALS
BODY MASS INDEX: 27.3 KG/M2 | SYSTOLIC BLOOD PRESSURE: 128 MMHG | WEIGHT: 206 LBS | TEMPERATURE: 98 F | HEART RATE: 82 BPM | RESPIRATION RATE: 18 BRPM | DIASTOLIC BLOOD PRESSURE: 88 MMHG | HEIGHT: 73 IN | OXYGEN SATURATION: 97 %

## 2018-06-19 DIAGNOSIS — I10 ESSENTIAL HYPERTENSION: Chronic | ICD-10-CM

## 2018-06-19 DIAGNOSIS — R73.01 IMPAIRED FASTING GLUCOSE: ICD-10-CM

## 2018-06-19 DIAGNOSIS — R97.20 ELEVATED PSA: ICD-10-CM

## 2018-06-19 DIAGNOSIS — M19.90 OSTEOARTHRITIS, UNSPECIFIED OSTEOARTHRITIS TYPE, UNSPECIFIED SITE: Primary | Chronic | ICD-10-CM

## 2018-06-19 DIAGNOSIS — Z23 IMMUNIZATION DUE: ICD-10-CM

## 2018-06-19 LAB
25(OH)D3+25(OH)D2 SERPL-MCNC: 30.7 NG/ML (ref 30–100)
ALBUMIN SERPL-MCNC: 4.1 G/DL (ref 3.5–5)
ALBUMIN/GLOB SERPL: 1.5 G/DL (ref 1.1–2.5)
ALP SERPL-CCNC: 58 U/L (ref 24–120)
ALT SERPL-CCNC: 32 U/L (ref 0–54)
AST SERPL-CCNC: 24 U/L (ref 7–45)
BASOPHILS # BLD AUTO: 0.03 10*3/MM3 (ref 0–0.2)
BASOPHILS NFR BLD AUTO: 0.4 % (ref 0–2)
BILIRUB SERPL-MCNC: 0.6 MG/DL (ref 0.1–1)
BUN SERPL-MCNC: 19 MG/DL (ref 5–21)
BUN/CREAT SERPL: 18.4 (ref 7–25)
CALCIUM SERPL-MCNC: 9.4 MG/DL (ref 8.4–10.4)
CHLORIDE SERPL-SCNC: 100 MMOL/L (ref 98–110)
CHOLEST SERPL-MCNC: 162 MG/DL (ref 130–200)
CO2 SERPL-SCNC: 27 MMOL/L (ref 24–31)
CREAT SERPL-MCNC: 1.03 MG/DL (ref 0.5–1.4)
EOSINOPHIL # BLD AUTO: 0.1 10*3/MM3 (ref 0–0.7)
EOSINOPHIL NFR BLD AUTO: 1.2 % (ref 0–4)
ERYTHROCYTE [DISTWIDTH] IN BLOOD BY AUTOMATED COUNT: 13.7 % (ref 12–15)
GFR SERPLBLD CREATININE-BSD FMLA CKD-EPI: 71 ML/MIN/1.73
GFR SERPLBLD CREATININE-BSD FMLA CKD-EPI: 86 ML/MIN/1.73
GLOBULIN SER CALC-MCNC: 2.7 GM/DL
GLUCOSE SERPL-MCNC: 110 MG/DL (ref 70–100)
HBA1C MFR BLD: 5.7 %
HCT VFR BLD AUTO: 46.5 % (ref 40–52)
HCV AB S/CO SERPL IA: <0.1 S/CO RATIO (ref 0–0.9)
HDLC SERPL-MCNC: 35 MG/DL
HGB BLD-MCNC: 15.1 G/DL (ref 14–18)
IMM GRANULOCYTES # BLD: 0.05 10*3/MM3 (ref 0–0.03)
IMM GRANULOCYTES NFR BLD: 0.6 % (ref 0–5)
LDLC SERPL CALC-MCNC: 111 MG/DL (ref 0–99)
LYMPHOCYTES # BLD AUTO: 1.75 10*3/MM3 (ref 0.72–4.86)
LYMPHOCYTES NFR BLD AUTO: 21.1 % (ref 15–45)
MCH RBC QN AUTO: 28 PG (ref 28–32)
MCHC RBC AUTO-ENTMCNC: 32.5 G/DL (ref 33–36)
MCV RBC AUTO: 86.1 FL (ref 82–95)
MONOCYTES # BLD AUTO: 0.64 10*3/MM3 (ref 0.19–1.3)
MONOCYTES NFR BLD AUTO: 7.7 % (ref 4–12)
NEUTROPHILS # BLD AUTO: 5.74 10*3/MM3 (ref 1.87–8.4)
NEUTROPHILS NFR BLD AUTO: 69 % (ref 39–78)
NRBC BLD AUTO-RTO: 0 /100 WBC (ref 0–0)
PLATELET # BLD AUTO: 189 10*3/MM3 (ref 130–400)
POTASSIUM SERPL-SCNC: 4.1 MMOL/L (ref 3.5–5.3)
PROT SERPL-MCNC: 6.8 G/DL (ref 6.3–8.7)
RBC # BLD AUTO: 5.4 10*6/MM3 (ref 4.8–5.9)
SODIUM SERPL-SCNC: 140 MMOL/L (ref 135–145)
TRIGL SERPL-MCNC: 78 MG/DL (ref 0–149)
TSH SERPL DL<=0.005 MIU/L-ACNC: 3.38 MIU/ML (ref 0.47–4.68)
VLDLC SERPL CALC-MCNC: 15.6 MG/DL
WBC # BLD AUTO: 8.31 10*3/MM3 (ref 4.8–10.8)

## 2018-06-19 PROCEDURE — 95115 IMMUNOTHERAPY ONE INJECTION: CPT | Performed by: FAMILY MEDICINE

## 2018-06-19 PROCEDURE — 99213 OFFICE O/P EST LOW 20 MIN: CPT | Performed by: FAMILY MEDICINE

## 2018-06-19 NOTE — PATIENT INSTRUCTIONS
There are several vaccines used for individuals near/over 65 years old.    1. Tetanus.   Like anyone this needs to given every 10 years; sooner for/with lacerations/wounds.   Likely when getting this booster it needs to be a tetanus called Tdap (tetanus mixed with diptheria and pertussis).   Years ago you had this vaccine.  We now know we can lose our immunity to pertussis (a part of this vaccine) and run a risk of catching this.  Now only would this make us ill; but more importantly we can spread this to very young children (and for them it can be a much more dangerous illness).   We call this the grandparent vaccine for this reason.     2. Pneumonia.   This comes now with two brands.   It is recommended to take pneumovax first; and a year later take the cousin prevnar.  Even if you have had these before; we need to review when and your current health situation/s as you may need boosters.     3. Shingles.  You do not want to catch shingles.  Though you will recover from this; the pain associated with shingles can be severe.  Even if you have had the now older zostavax, or have had shingles; it is recommended you still get the Shingrix (the new just available early 2018 shingles vaccine).   See information on Shingrix also on your handout.     4. Yearly flu vaccine given from September through April each year.     5. Travel vaccines:  If you are one to do international travel; be sure and ask us for any particular unusual vaccines you may need.     6.  Miscellaneous:  If you have certain health situations/disease you may need specific/particular vaccines not give to the general public.     A new shingles vaccine (a shot to lower your chance of catching shingles) is now available (shingrix).  This vaccine is the second vaccine created for this purpose; we have had zostavax for years.  Shingrix provides a much better and longer immunity for shingles than zostavax.  For this and other reasons Shingrix can be started at  age 50.  If you have had zostavax in the past; you can still take Shingrix.      This vaccine is not paid for in a doctor's office by medicare, medicaid and probably most insurances.  Like zostavax; this is covered in drug stores.  This is a vaccine that if you chose to get you need to get at a drug store that gives vaccines (like [facility], [facility] and [facility].      If you get this vaccine; please let us know so we can record this on your record here.

## 2018-06-19 NOTE — PROGRESS NOTES
Subjective   Elver Gordon is a 73 y.o. male presenting with chief complaint of:   Chief Complaint   Patient presents with   • Hypertension       History of Present Illness :  Alone.   Has multiple chronic problems to consider that might have a bearing on today's issues;  an interval appointment.       Chronic/acute problems to review today:   1. Osteoarthritis, unspecified osteoarthritis type, unspecified site: chronic; diffuse occ joints like hip, knee; but no joint swelling and no significant limitations daily activities.    2. Essential hypertension: chronic; rare home check ok like todays.     3. Impaired fasting glucose: chronic; patient aware and low sweet diet; physicially active.    4. Elevated PSA-astudillo: chronic/benign to this point.  Has f/u   5. Immunization due: chronic/variable will change with age; behind on pneumovax.      Has an/another acute issue today: .    The following portions of the patient's history were reviewed and updated as appropriate: allergies, current medications, past family history, past medical history, past social history, past surgical history and problem list.      Current Outpatient Prescriptions:   •  captopril (CAPOTEN) 25 MG tablet, TAKE 1 TABLET BY MOUTH  TWICE A DAY, Disp: 180 tablet, Rfl: 2  •  finasteride (PROSCAR) 5 MG tablet, TAKE 1 TABLET BY MOUTH  DAILY, Disp: 90 tablet, Rfl: 0  •  hydrochlorothiazide (HYDRODIURIL) 12.5 MG tablet, TAKE 1 TABLET BY MOUTH  DAILY, Disp: 90 tablet, Rfl: 2  No current facility-administered medications for this visit.     No problems with medications.  Refills if needed done    No Known Allergies    Review of Systems  GENERAL:  Active/slower with limits, speed, stamina for age and vertigo. Sleep is ok. No fever now.  ENDO:  No syncope, near or diaphoretic sweaty spells.  HEENT: No head injury or headache,  No vision change,  Same mild tinnitis/hearing loss.  Ears without pain/drainage.  No sore throat.  No significant nasal/sinus  congestion/drainage. No epistaxis.  CHEST: No chest wall tenderness or mass. No significant cough, without wheeze, SOB; no hemoptysis.  CV: No chest pain, palpitations, ankle edema.  GI: No heartburn, dysphagia.  No abdominal pain, diarrhea, constipation, rectal bleeding, or melena.    :  Voids without dysuria, or incontinence to completion.  ORTHO: No painful/swollen joints but various on /off sore.  No sore neck or back.  No acute neck or back pain without recent injury.   NEURO: No dizziness, weakness of extremities.  No numbness/paresthesias.   PSYCH: No memory loss.  Mood good; not anxious, depressed but/and not suicidal.  Tolerated stress.      Screening:  Mammogram:NA  Bone density: NA  Low dose CT chest: NA  Colonoscopy+polyp/MMH//1.9.13/5y  Colon-polyp//4.2.2018/5y       Results for orders placed or performed in visit on 06/18/18   Hepatitis C antibody   Result Value Ref Range    Hep C Virus Ab <0.1 0.0 - 0.9 s/co ratio   Comprehensive metabolic panel   Result Value Ref Range    Glucose 110 (H) 70 - 100 mg/dL    BUN 19 5 - 21 mg/dL    Creatinine 1.03 0.50 - 1.40 mg/dL    eGFR Non African Am 71 >60 mL/min/1.73    eGFR African Am 86 >60 mL/min/1.73    BUN/Creatinine Ratio 18.4 7.0 - 25.0    Sodium 140 135 - 145 mmol/L    Potassium 4.1 3.5 - 5.3 mmol/L    Chloride 100 98 - 110 mmol/L    Total CO2 27.0 24.0 - 31.0 mmol/L    Calcium 9.4 8.4 - 10.4 mg/dL    Total Protein 6.8 6.3 - 8.7 g/dL    Albumin 4.10 3.50 - 5.00 g/dL    Globulin 2.7 gm/dL    A/G Ratio 1.5 1.1 - 2.5 g/dL    Total Bilirubin 0.6 0.1 - 1.0 mg/dL    Alkaline Phosphatase 58 24 - 120 U/L    AST (SGOT) 24 7 - 45 U/L    ALT (SGPT) 32 0 - 54 U/L   Lipid panel   Result Value Ref Range    Total Cholesterol 162 130 - 200 mg/dL    Triglycerides 78 0 - 149 mg/dL    HDL Cholesterol 35 (L) >=40 mg/dL    VLDL Cholesterol 15.6 mg/dL    LDL Cholesterol  111 (H) 0 - 99 mg/dL   TSH   Result Value Ref Range    TSH 3.380 0.470 - 4.680 mIU/mL   Hemoglobin A1c    Result Value Ref Range    Hemoglobin A1C 5.70 %   Vitamin D 25 Hydroxy   Result Value Ref Range    25 Hydroxy, Vitamin D 30.7 30.0 - 100.0 ng/ml   CBC and Differential   Result Value Ref Range    WBC 8.31 4.80 - 10.80 10*3/mm3    RBC 5.40 4.80 - 5.90 10*6/mm3    Hemoglobin 15.1 14.0 - 18.0 g/dL    Hematocrit 46.5 40.0 - 52.0 %    MCV 86.1 82.0 - 95.0 fL    MCH 28.0 28.0 - 32.0 pg    MCHC 32.5 (L) 33.0 - 36.0 g/dL    RDW 13.7 12.0 - 15.0 %    Platelets 189 130 - 400 10*3/mm3    Neutrophil Rel % 69.0 39.0 - 78.0 %    Lymphocyte Rel % 21.1 15.0 - 45.0 %    Monocyte Rel % 7.7 4.0 - 12.0 %    Eosinophil Rel % 1.2 0.0 - 4.0 %    Basophil Rel % 0.4 0.0 - 2.0 %    Neutrophils Absolute 5.74 1.87 - 8.40 10*3/mm3    Lymphocytes Absolute 1.75 0.72 - 4.86 10*3/mm3    Monocytes Absolute 0.64 0.19 - 1.30 10*3/mm3    Eosinophils Absolute 0.10 0.00 - 0.70 10*3/mm3    Basophils Absolute 0.03 0.00 - 0.20 10*3/mm3    Immature Granulocyte Rel % 0.6 0.0 - 5.0 %    Immature Grans Absolute 0.05 (H) 0.00 - 0.03 10*3/mm3    nRBC 0.0 0.0 - 0.0 /100 WBC       Lab Results   Component Value Date    PSA 2.410 06/06/2017        Lab Results:  CBC:    Lab Results - Last 18 Months  Lab Units 06/18/18  0830 06/06/17  1018   WBC 10*3/mm3  --  9.23   HEMOGLOBIN g/dL 15.1 14.9   HEMATOCRIT % 46.5 45.6   PLATELETS 10*3/mm3 189 201      BMP/CMP:    Lab Results - Last 18 Months  Lab Units 06/18/18  0830 08/02/17  0755 06/06/17  1018   SODIUM mmol/L 140  --  137   POTASSIUM mmol/L 4.1  --  4.1   CHLORIDE mmol/L 100  --  100   TOTAL CO2, ARTERIAL mmol/L 27.0  --  26.0   GLUCOSE mg/dL 110*  --  106*   BUN mg/dL 19  --  17   CREATININE mg/dL 1.03 1.10 1.04   EGFR IF NONAFRICN AM mL/min/1.73 71  --  70   EGFR IF AFRICN AM mL/min/1.73 86  --  85   CALCIUM mg/dL 9.4  --  10.1     HEPATIC:    Lab Results - Last 18 Months  Lab Units 06/18/18  0830 06/06/17  1018   ALT (SGPT) U/L 32 28   AST (SGOT) U/L 24 25   ALK PHOS U/L 58 62     THYROID:    Lab Results - Last  "18 Months  Lab Units 06/18/18  0830 06/06/17  1018   TSH mIU/mL 3.380 2.260     A1C:    Lab Results - Last 18 Months  Lab Units 06/18/18  0830   HEMOGLOBIN A1C % 5.70     PSA:    Lab Results - Last 18 Months  Lab Units 06/06/17  1018   PSA ng/mL 2.410       Objective   /88 (BP Location: Left arm, Patient Position: Sitting, Cuff Size: Large Adult)   Pulse 82   Temp 98 °F (36.7 °C) (Oral)   Resp 18   Ht 185.4 cm (73\")   Wt 93.4 kg (206 lb)   SpO2 97%   BMI 27.18 kg/m²   Body mass index is 27.18 kg/m².    Physical Exam  GENERAL:  Well nourished/developed in no acute distress.  SKIN: Turgor excellent, without wound, rash, lesion.  HEENT: Normal cephalic without trauma.  Pupils equal round reactive to light. Extraocular motions full without nystagmus.   External canals nonobstructive nontender without reddness. Tymphatic membranes annie with coby structures intact.   Oral cavity without growths, exudates, and moist.  Posterior pharynx without mass, obstruction, redness.  No thyromegaly, mass, tenderness, lymphadenopathy and supple.  CV: Regular rhythm.  No murmur, gallop,  edema. Posterior pulses intact.  No carotid bruits.  CHEST: No chest wall tenderness or mass.   LUNGS: Symmetric motion with clear to auscultation.  No dullness to percussion  ABD: Soft, nontender without mass.   ORTHO: Symmetric extremities without swelling/point tenderness.  Full gross range of motion..  NEURO: CN 2-12 grossly intact.  Symmetric facies. 1/4 x bicep knee ankle equal reflexes.  UE/LE   3-4/5 strength throughout.  Nonfocal use extremities. Speech clear.  Finger/nose and tandom walking ok.  Turning head; causes room to spin.     PSYCH: Oriented x 3.  Pleasant calm, well kept.  Purposeful/directed conservation with intact short/long gross memory.     Assessment/Plan     1. Osteoarthritis, unspecified osteoarthritis type, unspecified site    2. Essential hypertension    3. Impaired fasting glucose    4. Elevated PSA-astudillo  "   5. Immunization due        Rx: reviewed/changes:  same    LAB/Testing/Referrals: reviewed/orders:   Today: none  No orders of the defined types were placed in this encounter.    Usual:   any labs urology/other MD wants  6m BMP, A1c  12m CBC, CMP, A1c,  LIPID, TSH, Vit D, PSAs    Discussions:   below  Body mass index is 27.18 kg/m².   Patient's Body mass index is 27.18 kg/m². BMI is within normal parameters. No follow-up required.  Non-smoker    Patient Instructions   There are several vaccines used for individuals near/over 65 years old.    1. Tetanus.   Like anyone this needs to given every 10 years; sooner for/with lacerations/wounds.   Likely when getting this booster it needs to be a tetanus called Tdap (tetanus mixed with diptheria and pertussis).   Years ago you had this vaccine.  We now know we can lose our immunity to pertussis (a part of this vaccine) and run a risk of catching this.  Now only would this make us ill; but more importantly we can spread this to very young children (and for them it can be a much more dangerous illness).   We call this the grandparent vaccine for this reason.     2. Pneumonia.   This comes now with two brands.   It is recommended to take pneumovax first; and a year later take the cousin prevnar.  Even if you have had these before; we need to review when and your current health situation/s as you may need boosters.     3. Shingles.  You do not want to catch shingles.  Though you will recover from this; the pain associated with shingles can be severe.  Even if you have had the now older zostavax, or have had shingles; it is recommended you still get the Shingrix (the new just available early 2018 shingles vaccine).   See information on Shingrix also on your handout.     4. Yearly flu vaccine given from September through April each year.     5. Travel vaccines:  If you are one to do international travel; be sure and ask us for any particular unusual vaccines you may need.     6.   Miscellaneous:  If you have certain health situations/disease you may need specific/particular vaccines not give to the general public.     A new shingles vaccine (a shot to lower your chance of catching shingles) is now available (shingrix).  This vaccine is the second vaccine created for this purpose; we have had zostavax for years.  Shingrix provides a much better and longer immunity for shingles than zostavax.  For this and other reasons Shingrix can be started at age 50.  If you have had zostavax in the past; you can still take Shingrix.      This vaccine is not paid for in a doctor's office by medicare, medicaid and probably most insurances.  Like zostavax; this is covered in drug stores.  This is a vaccine that if you chose to get you need to get at a drug store that gives vaccines (like [facility], [facility] and [facility].      If you get this vaccine; please let us know so we can record this on your record here.           Follow up: Return for lab 6m: , lab during/or just before next apt;, Dr Lorenzo-, 12m;.  Future Appointments  Date Time Provider Department Center   6/22/2018 9:10 AM Kwaku Zuniga MD [facility] None   12/18/2018 8:15 [facility] None   [facility]2019 [facility]   6/25/2019 4:00 PM Andre Lorenzo MD [facility] None

## 2018-06-23 LAB
PSA FREE MFR SERPL: 28.1 %
PSA FREE SERPL-MCNC: 0.73 NG/ML
PSA SERPL-MCNC: 2.6 NG/ML (ref 0–4)

## 2018-06-26 NOTE — PROGRESS NOTES
Subjective    Mr. Gordon is 73 y.o. male    Chief Complaint: Elevated PSA    History of Present Illness    Elevated PSA  Patient is here with an elevated PSA. The PSA was drawn1 week(s). He does not have a family history of prostate cancer. His AUA Symptom Score is 3 /35. Voiding symptoms include Incomplete emptying, Intermittency and Weakened stream. Denies Frequency, Urgency, Straining and Nocturia. Voiding symptoms began several years  . These have been gradual in onset. Negative X 2 showed inflammation, last PSA 7.3.  Previous PSA values are :         Lab Results   Component Value Date    PSA 2.6 06/23/2018     PSA 2.410 06/06/2017         Benign Prostatic Hypertrophy  Patient complains of lower urinary tract symptoms. He reports incomplete emptying, intermittency and weak stream. He denies frequency, nocturia two times a night, straining and urgency. Patient states symptoms are of mild severity. Onset of symptoms was several years ago and was gradual in onset. His AUA Symptom Score is, 3/35.He reports a history of no complicating symptoms. He denies flank pain, gross hematuria, kidney stones and recurrent UTI.  Patient has tried 5 alpha reductase inhibitors with improvement. Last PSA was 2.4 .       The following portions of the patient's history were reviewed and updated as appropriate: allergies, current medications, past family history, past medical history, past social history, past surgical history and problem list.    Review of Systems   Constitutional: Negative for chills and fever.   Gastrointestinal: Negative for abdominal pain, anal bleeding and blood in stool.   Genitourinary: Negative for difficulty urinating, flank pain, frequency, hematuria and urgency.         Current Outpatient Prescriptions:   •  captopril (CAPOTEN) 25 MG tablet, TAKE 1 TABLET BY MOUTH  TWICE A DAY, Disp: 180 tablet, Rfl: 2  •  esomeprazole (nexIUM) 20 MG capsule, Take 20 mg by mouth Every Morning Before Breakfast., Disp: ,  "Rfl:   •  finasteride (PROSCAR) 5 MG tablet, Take 1 tablet by mouth Daily., Disp: 90 tablet, Rfl: 3  •  hydrochlorothiazide (HYDRODIURIL) 12.5 MG tablet, TAKE 1 TABLET BY MOUTH  DAILY, Disp: 90 tablet, Rfl: 2    Past Medical History:   Diagnosis Date   • Elevated PSA    • Hx of colonic polyps    • Hypertension    • Hypertriglyceridemia        Past Surgical History:   Procedure Laterality Date   • CHOLECYSTECTOMY     • COLONOSCOPY W/ POLYPECTOMY  01/09/2013    2 Tubular adenomas at 45cm and 70 cm repeat exam in 5 years   • KNEE SURGERY         Social History     Social History   • Marital status:      Spouse name: Arlin   • Number of children: 3   • Years of education: 13.5     Occupational History   • retired      [employer]     Social History Main Topics   • Smoking status: Former Smoker     Packs/day: 1.50     Years: 15.00     Types: Cigarettes     Start date: 1962     Quit date: 6/6/1987   • Smokeless tobacco: Never Used   • Alcohol use No   • Drug use: No   • Sexual activity: Defer     Other Topics Concern   • Not on file       Family History   Problem Relation Age of Onset   • No Known Problems Father    • No Known Problems Mother    • Colon polyps Brother    • Colon cancer Neg Hx        Objective    Temp 97.9 °F (36.6 °C)   Ht 186.7 cm (73.5\")   Wt 94.4 kg (208 lb 3.2 oz)   BMI 27.10 kg/m²     Physical Exam   Constitutional: He is oriented to person, place, and time. He appears well-developed and well-nourished.   Pulmonary/Chest: Effort normal.   Abdominal: Soft. He exhibits no distension and no mass. There is no tenderness. There is no rebound and no guarding. No hernia.   Genitourinary: Penis normal. Rectal exam shows no mass, no tenderness and anal tone normal. Enlarged: for the age of the patient. Right testis shows no mass, no swelling and no tenderness. Left testis shows no mass, no swelling and no tenderness. No hypospadias. No discharge found.   Genitourinary Comments:  The urethral " meatus normal in position without evidence of stricture. Epididymis without mass or tenderness. Vas Deferens is palpably normal.Anus and perineum without mass or tenderness. The prostate is approximately 35 ml. It is Symmetric, with a Soft consistency. There are no nodules present. . The seminal vesicles are Not palpable due to the size of the prostate.     Neurological: He is alert and oriented to person, place, and time.   Vitals reviewed.          Results for orders placed or performed in visit on 06/28/18   POC Urinalysis Dipstick, Multipro   Result Value Ref Range    Color Yellow Yellow, Straw, Dark Yellow, Ladan    Clarity, UA Clear Clear    Glucose, UA Negative Negative, 1000 mg/dL (3+) mg/dL    Bilirubin Negative Negative    Ketones, UA Negative Negative    Specific Gravity  1.015 1.005 - 1.030    Blood, UA Negative Negative    pH, Urine 6.0 5.0 - 8.0    Protein, POC Negative Negative mg/dL    Urobilinogen, UA Normal Normal    Nitrite, UA Negative Negative    Leukocytes Trace (A) Negative     Assessment and Plan    Elver was seen today for elevated psa.    Diagnoses and all orders for this visit:    Benign prostatic hyperplasia with urinary obstruction  -     POC Urinalysis Dipstick, Multipro  -     finasteride (PROSCAR) 5 MG tablet; Take 1 tablet by mouth Daily.    Elevated prostate specific antigen (PSA)  -     PSA, Total & Free; Future          Patient with a history of 2 negative biopsies both showed inflammation.  His last biopsy was for a PSA of 7.2.   He has somewhat of a zigzag pattern to his PSA.  His PSA today is 2.6 corrected for finasteride 5.2 free 28%.  We will continue yearly follow-up with PSA and percent free.

## 2018-06-28 ENCOUNTER — OFFICE VISIT (OUTPATIENT)
Dept: UROLOGY | Facility: CLINIC | Age: 73
End: 2018-06-28

## 2018-06-28 VITALS — HEIGHT: 74 IN | TEMPERATURE: 97.9 F | WEIGHT: 208.2 LBS | BODY MASS INDEX: 26.72 KG/M2

## 2018-06-28 DIAGNOSIS — N13.8 BENIGN PROSTATIC HYPERPLASIA WITH URINARY OBSTRUCTION: Primary | ICD-10-CM

## 2018-06-28 DIAGNOSIS — R97.20 ELEVATED PROSTATE SPECIFIC ANTIGEN (PSA): ICD-10-CM

## 2018-06-28 DIAGNOSIS — N40.1 BENIGN PROSTATIC HYPERPLASIA WITH URINARY OBSTRUCTION: Primary | ICD-10-CM

## 2018-06-28 LAB
BILIRUB BLD-MCNC: NEGATIVE MG/DL
CLARITY, POC: CLEAR
COLOR UR: YELLOW
GLUCOSE UR STRIP-MCNC: NEGATIVE MG/DL
KETONES UR QL: NEGATIVE
LEUKOCYTE EST, POC: ABNORMAL
NITRITE UR-MCNC: NEGATIVE MG/ML
PH UR: 6 [PH] (ref 5–8)
PROT UR STRIP-MCNC: NEGATIVE MG/DL
RBC # UR STRIP: NEGATIVE /UL
SP GR UR: 1.01 (ref 1–1.03)
UROBILINOGEN UR QL: NORMAL

## 2018-06-28 PROCEDURE — 81001 URINALYSIS AUTO W/SCOPE: CPT | Performed by: UROLOGY

## 2018-06-28 PROCEDURE — 99213 OFFICE O/P EST LOW 20 MIN: CPT | Performed by: UROLOGY

## 2018-06-28 RX ORDER — FINASTERIDE 5 MG/1
5 TABLET, FILM COATED ORAL DAILY
Qty: 90 TABLET | Refills: 3 | Status: SHIPPED | OUTPATIENT
Start: 2018-06-28 | End: 2019-07-03 | Stop reason: SDUPTHER

## 2018-09-27 RX ORDER — HYDROCHLOROTHIAZIDE 12.5 MG/1
TABLET ORAL
Qty: 90 TABLET | Refills: 1 | Status: SHIPPED | OUTPATIENT
Start: 2018-09-27 | End: 2019-03-25 | Stop reason: SDUPTHER

## 2018-09-27 RX ORDER — CAPTOPRIL 25 MG/1
TABLET ORAL
Qty: 180 TABLET | Refills: 1 | Status: SHIPPED | OUTPATIENT
Start: 2018-09-27 | End: 2019-03-25 | Stop reason: SDUPTHER

## 2018-12-17 DIAGNOSIS — I10 ESSENTIAL HYPERTENSION: Primary | Chronic | ICD-10-CM

## 2018-12-17 DIAGNOSIS — R73.01 IMPAIRED FASTING GLUCOSE: ICD-10-CM

## 2018-12-18 ENCOUNTER — RESULTS ENCOUNTER (OUTPATIENT)
Dept: FAMILY MEDICINE CLINIC | Facility: CLINIC | Age: 73
End: 2018-12-18

## 2018-12-18 DIAGNOSIS — R73.01 IMPAIRED FASTING GLUCOSE: ICD-10-CM

## 2018-12-18 DIAGNOSIS — I10 ESSENTIAL HYPERTENSION: Chronic | ICD-10-CM

## 2019-03-25 RX ORDER — HYDROCHLOROTHIAZIDE 12.5 MG/1
TABLET ORAL
Qty: 90 TABLET | Refills: 3 | Status: SHIPPED | OUTPATIENT
Start: 2019-03-25 | End: 2020-05-08

## 2019-03-25 RX ORDER — CAPTOPRIL 25 MG/1
TABLET ORAL
Qty: 180 TABLET | Refills: 3 | Status: SHIPPED | OUTPATIENT
Start: 2019-03-25 | End: 2020-05-08

## 2019-06-27 ENCOUNTER — TELEPHONE (OUTPATIENT)
Dept: UROLOGY | Facility: CLINIC | Age: 74
End: 2019-06-27

## 2019-06-27 NOTE — TELEPHONE ENCOUNTER
Called and was unable to leave the patient a message about their missed lab appointment. Called the patient's emergency contact and left a message for the patient to call back and reschedule his lab appointment.

## 2019-06-28 ENCOUNTER — RESULTS ENCOUNTER (OUTPATIENT)
Dept: UROLOGY | Facility: CLINIC | Age: 74
End: 2019-06-28

## 2019-06-28 DIAGNOSIS — R97.20 ELEVATED PROSTATE SPECIFIC ANTIGEN (PSA): ICD-10-CM

## 2019-06-29 LAB
PSA FREE MFR SERPL: 27.2 %
PSA FREE SERPL-MCNC: 0.79 NG/ML
PSA SERPL-MCNC: 2.9 NG/ML (ref 0–4)

## 2019-07-02 NOTE — PROGRESS NOTES
Subjective    Mr. Gordon is 74 y.o. male    Chief Complaint: Elevated PSA    History of Present Illness     Elevated PSA  Patient is here with an elevated PSA. The PSA was drawn1 week(s). He does not have a family history of prostate cancer. His AUA Symptom Score is 4 /35. Voiding symptoms include Incomplete emptying, Intermittency and Weakened stream. Denies Frequency, Urgency, Straining and Nocturia. Voiding symptoms began several years  . These have been gradual in onset. Negative X 2 showed inflammation, last PSA with finasteride correction 5.8.  Lab Results   Component Value Date    PSA 2.9 06/28/2019    PSA 2.6 06/22/2018    PSA 2.410 06/06/2017            The following portions of the patient's history were reviewed and updated as appropriate: allergies, current medications, past family history, past medical history, past social history, past surgical history and problem list.    Review of Systems   Constitutional: Negative for chills and fever.   Gastrointestinal: Negative for abdominal pain, anal bleeding and blood in stool.   Genitourinary: Negative for decreased urine volume, difficulty urinating, discharge, dysuria, enuresis, flank pain, frequency, genital sores, hematuria, penile pain, penile swelling, scrotal swelling, testicular pain and urgency.         Current Outpatient Medications:   •  captopril (CAPOTEN) 25 MG tablet, TAKE 1 TABLET BY MOUTH  TWICE A DAY, Disp: 180 tablet, Rfl: 3  •  esomeprazole (nexIUM) 20 MG capsule, Take 20 mg by mouth Every Morning Before Breakfast., Disp: , Rfl:   •  finasteride (PROSCAR) 5 MG tablet, Take 1 tablet by mouth Daily., Disp: 90 tablet, Rfl: 3  •  hydrochlorothiazide (HYDRODIURIL) 12.5 MG tablet, TAKE 1 TABLET BY MOUTH  DAILY, Disp: 90 tablet, Rfl: 3    Past Medical History:   Diagnosis Date   • Elevated PSA    • Hx of colonic polyps    • Hypertension    • Hypertriglyceridemia        Past Surgical History:   Procedure Laterality Date   • CHOLECYSTECTOMY     •  "COLONOSCOPY W/ POLYPECTOMY  2013    2 Tubular adenomas at 45cm and 70 cm repeat exam in 5 years   • KNEE SURGERY         Social History     Socioeconomic History   • Marital status:      Spouse name: Arlin   • Number of children: 3   • Years of education: 13.5   • Highest education level: Not on file   Occupational History   • Occupation: retired     Comment: [employer]   Tobacco Use   • Smoking status: Former Smoker     Packs/day: 1.50     Years: 15.00     Pack years: 22.50     Types: Cigarettes     Start date:      Last attempt to quit: 1987     Years since quittin.0   • Smokeless tobacco: Never Used   Substance and Sexual Activity   • Alcohol use: No   • Drug use: No   • Sexual activity: Defer       Family History   Problem Relation Age of Onset   • No Known Problems Father    • No Known Problems Mother    • Colon polyps Brother    • Colon cancer Neg Hx        Objective    Temp 98.6 °F (37 °C)   Ht 185.4 cm (73\")   Wt 93.4 kg (206 lb)   BMI 27.18 kg/m²     Physical Exam   Constitutional: He is oriented to person, place, and time. He appears well-developed and well-nourished.   Pulmonary/Chest: Effort normal.   Abdominal: Soft. He exhibits no distension and no mass. There is no tenderness. There is no rebound and no guarding. No hernia.   Genitourinary: Penis normal. Rectal exam shows no mass, no tenderness and anal tone normal. Enlarged: for the age of the patient. Right testis shows no mass, no swelling and no tenderness. Left testis shows no mass, no swelling and no tenderness. No hypospadias. No discharge found.   Genitourinary Comments:  The urethral meatus normal in position without evidence of stricture. Epididymis without mass or tenderness. Vas Deferens is palpably normal.Anus and perineum without mass or tenderness. The prostate is approximately 35 ml. It is Symmetric, with a Soft consistency. There are no nodules present. . The seminal vesicles are Not palpable due to " the size of the prostate.     Neurological: He is alert and oriented to person, place, and time.   Vitals reviewed.          Results for orders placed or performed in visit on 07/03/19   POC Urinalysis Dipstick, Multipro   Result Value Ref Range    Color Yellow Yellow, Straw, Dark Yellow, Ladan    Clarity, UA Clear Clear    Glucose, UA Negative Negative, 1000 mg/dL (3+) mg/dL    Bilirubin Negative Negative    Ketones, UA Trace (A) Negative    Specific Gravity  1.020 1.005 - 1.030    Blood, UA Negative Negative    pH, Urine 5.5 5.0 - 8.0    Protein, POC Negative Negative mg/dL    Urobilinogen, UA Normal Normal    Nitrite, UA Negative Negative    Leukocytes Negative Negative   Patient's Body mass index is 27.18 kg/m². BMI is above normal parameters. Recommendations include: educational material.    Assessment and Plan    Diagnoses and all orders for this visit:    Elevated prostate specific antigen (PSA)  -     POC Urinalysis Dipstick, Multipro  -     PSA, Total & Free; Future    Benign prostatic hyperplasia with urinary obstruction        Patient with a history of 2 negative biopsies both showed inflammation.  His last biopsy was for a PSA of 7.2.   He has somewhat of a zigzag pattern to his PSA.  His PSA today is 2.9 corrected for finasteride 5.8 free 27%.  We will continue yearly follow-up with PSA and percent free.

## 2019-07-03 ENCOUNTER — OFFICE VISIT (OUTPATIENT)
Dept: UROLOGY | Facility: CLINIC | Age: 74
End: 2019-07-03

## 2019-07-03 VITALS — HEIGHT: 73 IN | TEMPERATURE: 98.6 F | WEIGHT: 206 LBS | BODY MASS INDEX: 27.3 KG/M2

## 2019-07-03 DIAGNOSIS — N40.1 BENIGN PROSTATIC HYPERPLASIA WITH URINARY OBSTRUCTION: ICD-10-CM

## 2019-07-03 DIAGNOSIS — R97.20 ELEVATED PROSTATE SPECIFIC ANTIGEN (PSA): Primary | ICD-10-CM

## 2019-07-03 DIAGNOSIS — N13.8 BENIGN PROSTATIC HYPERPLASIA WITH URINARY OBSTRUCTION: ICD-10-CM

## 2019-07-03 LAB
BILIRUB BLD-MCNC: NEGATIVE MG/DL
CLARITY, POC: CLEAR
COLOR UR: YELLOW
GLUCOSE UR STRIP-MCNC: NEGATIVE MG/DL
KETONES UR QL: ABNORMAL
LEUKOCYTE EST, POC: NEGATIVE
NITRITE UR-MCNC: NEGATIVE MG/ML
PH UR: 5.5 [PH] (ref 5–8)
PROT UR STRIP-MCNC: NEGATIVE MG/DL
RBC # UR STRIP: NEGATIVE /UL
SP GR UR: 1.02 (ref 1–1.03)
UROBILINOGEN UR QL: NORMAL

## 2019-07-03 PROCEDURE — 81001 URINALYSIS AUTO W/SCOPE: CPT | Performed by: UROLOGY

## 2019-07-03 PROCEDURE — 99213 OFFICE O/P EST LOW 20 MIN: CPT | Performed by: UROLOGY

## 2019-07-03 RX ORDER — FINASTERIDE 5 MG/1
5 TABLET, FILM COATED ORAL DAILY
Qty: 90 TABLET | Refills: 3 | Status: SHIPPED | OUTPATIENT
Start: 2019-07-03 | End: 2020-05-08

## 2019-07-03 NOTE — PATIENT INSTRUCTIONS

## 2019-07-11 DIAGNOSIS — R97.20 ELEVATED PSA: ICD-10-CM

## 2019-07-11 DIAGNOSIS — R73.01 IMPAIRED FASTING GLUCOSE: ICD-10-CM

## 2019-07-11 DIAGNOSIS — Z00.00 WELLNESS EXAMINATION: ICD-10-CM

## 2019-07-11 DIAGNOSIS — D69.6 THROMBOCYTOPENIA (HCC): Primary | ICD-10-CM

## 2019-07-11 DIAGNOSIS — R97.20 ELEVATED PROSTATE SPECIFIC ANTIGEN (PSA): ICD-10-CM

## 2019-07-11 DIAGNOSIS — M19.90 OSTEOARTHRITIS, UNSPECIFIED OSTEOARTHRITIS TYPE, UNSPECIFIED SITE: ICD-10-CM

## 2019-07-11 DIAGNOSIS — I10 ESSENTIAL HYPERTENSION: ICD-10-CM

## 2019-07-11 DIAGNOSIS — E55.9 VITAMIN D DEFICIENCY: ICD-10-CM

## 2019-07-12 ENCOUNTER — RESULTS ENCOUNTER (OUTPATIENT)
Dept: FAMILY MEDICINE CLINIC | Facility: CLINIC | Age: 74
End: 2019-07-12

## 2019-07-12 DIAGNOSIS — M19.90 OSTEOARTHRITIS, UNSPECIFIED OSTEOARTHRITIS TYPE, UNSPECIFIED SITE: ICD-10-CM

## 2019-07-12 DIAGNOSIS — Z00.00 WELLNESS EXAMINATION: ICD-10-CM

## 2019-07-12 DIAGNOSIS — R97.20 ELEVATED PSA: ICD-10-CM

## 2019-07-12 DIAGNOSIS — E55.9 VITAMIN D DEFICIENCY: ICD-10-CM

## 2019-07-12 DIAGNOSIS — I10 ESSENTIAL HYPERTENSION: ICD-10-CM

## 2019-07-12 DIAGNOSIS — R97.20 ELEVATED PROSTATE SPECIFIC ANTIGEN (PSA): ICD-10-CM

## 2019-07-12 DIAGNOSIS — R73.01 IMPAIRED FASTING GLUCOSE: ICD-10-CM

## 2019-07-12 DIAGNOSIS — D69.6 THROMBOCYTOPENIA (HCC): ICD-10-CM

## 2019-07-13 LAB
25(OH)D3+25(OH)D2 SERPL-MCNC: 26.8 NG/ML (ref 30–100)
ALBUMIN SERPL-MCNC: 4.7 G/DL (ref 3.5–5.2)
ALBUMIN/GLOB SERPL: 2.6 G/DL
ALP SERPL-CCNC: 60 U/L (ref 39–117)
ALT SERPL-CCNC: 13 U/L (ref 1–41)
AST SERPL-CCNC: 15 U/L (ref 1–40)
BASOPHILS # BLD AUTO: 0.02 10*3/MM3 (ref 0–0.2)
BASOPHILS NFR BLD AUTO: 0.2 % (ref 0–1.5)
BILIRUB SERPL-MCNC: 0.5 MG/DL (ref 0.2–1.2)
BUN SERPL-MCNC: 20 MG/DL (ref 8–23)
BUN/CREAT SERPL: 18.9 (ref 7–25)
CALCIUM SERPL-MCNC: 9.2 MG/DL (ref 8.6–10.5)
CHLORIDE SERPL-SCNC: 102 MMOL/L (ref 98–107)
CHOLEST SERPL-MCNC: 152 MG/DL (ref 0–200)
CHOLEST/HDLC SERPL: 4 {RATIO}
CO2 SERPL-SCNC: 25.9 MMOL/L (ref 22–29)
CREAT SERPL-MCNC: 1.06 MG/DL (ref 0.76–1.27)
EOSINOPHIL # BLD AUTO: 0.07 10*3/MM3 (ref 0–0.4)
EOSINOPHIL NFR BLD AUTO: 0.8 % (ref 0.3–6.2)
ERYTHROCYTE [DISTWIDTH] IN BLOOD BY AUTOMATED COUNT: 14.1 % (ref 12.3–15.4)
GLOBULIN SER CALC-MCNC: 1.8 GM/DL
GLUCOSE SERPL-MCNC: 121 MG/DL (ref 65–99)
HBA1C MFR BLD: 6 % (ref 4.8–5.6)
HCT VFR BLD AUTO: 46 % (ref 37.5–51)
HDLC SERPL-MCNC: 38 MG/DL (ref 40–60)
HGB BLD-MCNC: 14.8 G/DL (ref 13–17.7)
IMM GRANULOCYTES # BLD AUTO: 0.03 10*3/MM3 (ref 0–0.05)
IMM GRANULOCYTES NFR BLD AUTO: 0.4 % (ref 0–0.5)
LDLC SERPL CALC-MCNC: 101 MG/DL (ref 0–100)
LYMPHOCYTES # BLD AUTO: 1.47 10*3/MM3 (ref 0.7–3.1)
LYMPHOCYTES NFR BLD AUTO: 17.2 % (ref 19.6–45.3)
MCH RBC QN AUTO: 28.3 PG (ref 26.6–33)
MCHC RBC AUTO-ENTMCNC: 32.2 G/DL (ref 31.5–35.7)
MCV RBC AUTO: 88 FL (ref 79–97)
MONOCYTES # BLD AUTO: 0.66 10*3/MM3 (ref 0.1–0.9)
MONOCYTES NFR BLD AUTO: 7.7 % (ref 5–12)
NEUTROPHILS # BLD AUTO: 6.32 10*3/MM3 (ref 1.7–7)
NEUTROPHILS NFR BLD AUTO: 73.7 % (ref 42.7–76)
NRBC BLD AUTO-RTO: 0 /100 WBC (ref 0–0.2)
PLATELET # BLD AUTO: 204 10*3/MM3 (ref 140–450)
POTASSIUM SERPL-SCNC: 4.2 MMOL/L (ref 3.5–5.2)
PROT SERPL-MCNC: 6.5 G/DL (ref 6–8.5)
RBC # BLD AUTO: 5.23 10*6/MM3 (ref 4.14–5.8)
SODIUM SERPL-SCNC: 139 MMOL/L (ref 136–145)
TRIGL SERPL-MCNC: 66 MG/DL (ref 0–150)
TSH SERPL DL<=0.005 MIU/L-ACNC: 4.09 MIU/ML (ref 0.27–4.2)
VLDLC SERPL CALC-MCNC: 13.2 MG/DL
WBC # BLD AUTO: 8.57 10*3/MM3 (ref 3.4–10.8)

## 2019-07-16 ENCOUNTER — OFFICE VISIT (OUTPATIENT)
Dept: FAMILY MEDICINE CLINIC | Facility: CLINIC | Age: 74
End: 2019-07-16

## 2019-07-16 VITALS
OXYGEN SATURATION: 97 % | WEIGHT: 200 LBS | HEIGHT: 73 IN | TEMPERATURE: 98.5 F | DIASTOLIC BLOOD PRESSURE: 82 MMHG | BODY MASS INDEX: 26.51 KG/M2 | SYSTOLIC BLOOD PRESSURE: 134 MMHG | HEART RATE: 92 BPM | RESPIRATION RATE: 20 BRPM

## 2019-07-16 DIAGNOSIS — R06.02 SHORTNESS OF BREATH: ICD-10-CM

## 2019-07-16 DIAGNOSIS — R73.01 IMPAIRED FASTING GLUCOSE: ICD-10-CM

## 2019-07-16 DIAGNOSIS — I10 ESSENTIAL HYPERTENSION: Primary | Chronic | ICD-10-CM

## 2019-07-16 DIAGNOSIS — M19.90 OSTEOARTHRITIS, UNSPECIFIED OSTEOARTHRITIS TYPE, UNSPECIFIED SITE: Chronic | ICD-10-CM

## 2019-07-16 PROBLEM — Z87.891 HISTORY OF TOBACCO USE: Status: ACTIVE | Noted: 2019-07-16

## 2019-07-16 PROBLEM — Z96.60 HISTORY OF JOINT REPLACEMENT: Status: ACTIVE | Noted: 2019-07-16

## 2019-07-16 PROCEDURE — 99214 OFFICE O/P EST MOD 30 MIN: CPT | Performed by: FAMILY MEDICINE

## 2019-07-16 RX ORDER — OMEPRAZOLE 20 MG/1
20 TABLET, DELAYED RELEASE ORAL DAILY
COMMUNITY
End: 2020-07-16

## 2019-07-19 DIAGNOSIS — R06.02 SHORTNESS OF BREATH: Primary | ICD-10-CM

## 2019-07-24 ENCOUNTER — HOSPITAL ENCOUNTER (OUTPATIENT)
Dept: PULMONOLOGY | Facility: HOSPITAL | Age: 74
Discharge: HOME | End: 2019-07-24
Admitting: FAMILY MEDICINE

## 2019-07-24 DIAGNOSIS — R06.02 SHORTNESS OF BREATH: ICD-10-CM

## 2019-07-24 PROCEDURE — 94060 EVALUATION OF WHEEZING: CPT

## 2019-07-24 PROCEDURE — 94729 DIFFUSING CAPACITY: CPT | Performed by: INTERNAL MEDICINE

## 2019-07-24 PROCEDURE — 94060 EVALUATION OF WHEEZING: CPT | Performed by: INTERNAL MEDICINE

## 2019-07-24 PROCEDURE — 94726 PLETHYSMOGRAPHY LUNG VOLUMES: CPT

## 2019-07-24 PROCEDURE — 94729 DIFFUSING CAPACITY: CPT

## 2019-07-24 PROCEDURE — 94726 PLETHYSMOGRAPHY LUNG VOLUMES: CPT | Performed by: INTERNAL MEDICINE

## 2019-07-24 RX ORDER — ALBUTEROL SULFATE 2.5 MG/3ML
2.5 SOLUTION RESPIRATORY (INHALATION) ONCE
Status: COMPLETED | OUTPATIENT
Start: 2019-07-24 | End: 2019-07-24

## 2019-07-24 RX ADMIN — ALBUTEROL SULFATE 2.5 MG: 2.5 SOLUTION RESPIRATORY (INHALATION) at 08:00

## 2019-07-25 ENCOUNTER — HOSPITAL ENCOUNTER (OUTPATIENT)
Dept: CARDIOLOGY | Facility: HOSPITAL | Age: 74
Discharge: HOME | End: 2019-07-25
Admitting: FAMILY MEDICINE

## 2019-07-25 VITALS
HEIGHT: 73 IN | WEIGHT: 200 LBS | DIASTOLIC BLOOD PRESSURE: 82 MMHG | SYSTOLIC BLOOD PRESSURE: 134 MMHG | BODY MASS INDEX: 26.51 KG/M2

## 2019-07-25 DIAGNOSIS — R06.02 SHORTNESS OF BREATH: ICD-10-CM

## 2019-07-25 PROCEDURE — 93306 TTE W/DOPPLER COMPLETE: CPT

## 2019-07-25 PROCEDURE — 93306 TTE W/DOPPLER COMPLETE: CPT | Performed by: INTERNAL MEDICINE

## 2019-07-26 LAB
BH CV ECHO MEAS - AO MAX PG (FULL): 0.71 MMHG
BH CV ECHO MEAS - AO MAX PG: 6.9 MMHG
BH CV ECHO MEAS - AO MEAN PG (FULL): 1 MMHG
BH CV ECHO MEAS - AO MEAN PG: 4 MMHG
BH CV ECHO MEAS - AO ROOT AREA (BSA CORRECTED): 1.5
BH CV ECHO MEAS - AO ROOT AREA: 8.6 CM^2
BH CV ECHO MEAS - AO ROOT DIAM: 3.3 CM
BH CV ECHO MEAS - AO V2 MAX: 131 CM/SEC
BH CV ECHO MEAS - AO V2 MEAN: 87.7 CM/SEC
BH CV ECHO MEAS - AO V2 VTI: 27.5 CM
BH CV ECHO MEAS - AVA(I,A): 3.2 CM^2
BH CV ECHO MEAS - AVA(I,D): 3.2 CM^2
BH CV ECHO MEAS - AVA(V,A): 3 CM^2
BH CV ECHO MEAS - AVA(V,D): 3 CM^2
BH CV ECHO MEAS - BSA(HAYCOCK): 2.2 M^2
BH CV ECHO MEAS - BSA: 2.2 M^2
BH CV ECHO MEAS - BZI_BMI: 26.4 KILOGRAMS/M^2
BH CV ECHO MEAS - BZI_METRIC_HEIGHT: 185.4 CM
BH CV ECHO MEAS - BZI_METRIC_WEIGHT: 90.7 KG
BH CV ECHO MEAS - EDV(CUBED): 62.6 ML
BH CV ECHO MEAS - EDV(MOD-SP4): 54.4 ML
BH CV ECHO MEAS - EDV(TEICH): 68.8 ML
BH CV ECHO MEAS - EF(CUBED): 65.3 %
BH CV ECHO MEAS - EF(MOD-SP4): 61.9 %
BH CV ECHO MEAS - EF(TEICH): 57.4 %
BH CV ECHO MEAS - ESV(CUBED): 21.7 ML
BH CV ECHO MEAS - ESV(MOD-SP4): 20.7 ML
BH CV ECHO MEAS - ESV(TEICH): 29.3 ML
BH CV ECHO MEAS - FS: 29.7 %
BH CV ECHO MEAS - IVS/LVPW: 1
BH CV ECHO MEAS - IVSD: 1 CM
BH CV ECHO MEAS - LA DIMENSION: 3.4 CM
BH CV ECHO MEAS - LA/AO: 1
BH CV ECHO MEAS - LAT PEAK E' VEL: 11.1 CM/SEC
BH CV ECHO MEAS - LV DIASTOLIC VOL/BSA (35-75): 25.3 ML/M^2
BH CV ECHO MEAS - LV MASS(C)D: 124 GRAMS
BH CV ECHO MEAS - LV MASS(C)DI: 57.6 GRAMS/M^2
BH CV ECHO MEAS - LV MAX PG: 6.2 MMHG
BH CV ECHO MEAS - LV MEAN PG: 3 MMHG
BH CV ECHO MEAS - LV SYSTOLIC VOL/BSA (12-30): 9.6 ML/M^2
BH CV ECHO MEAS - LV V1 MAX: 124 CM/SEC
BH CV ECHO MEAS - LV V1 MEAN: 73.3 CM/SEC
BH CV ECHO MEAS - LV V1 VTI: 27.6 CM
BH CV ECHO MEAS - LVIDD: 4 CM
BH CV ECHO MEAS - LVIDS: 2.8 CM
BH CV ECHO MEAS - LVLD AP4: 7.5 CM
BH CV ECHO MEAS - LVLS AP4: 6.5 CM
BH CV ECHO MEAS - LVOT AREA (M): 3.1 CM^2
BH CV ECHO MEAS - LVOT AREA: 3.1 CM^2
BH CV ECHO MEAS - LVOT DIAM: 2 CM
BH CV ECHO MEAS - LVPWD: 0.97 CM
BH CV ECHO MEAS - MED PEAK E' VEL: 6.9 CM/SEC
BH CV ECHO MEAS - MV A MAX VEL: 65.5 CM/SEC
BH CV ECHO MEAS - MV DEC SLOPE: 248 CM/SEC^2
BH CV ECHO MEAS - MV DEC TIME: 0.16 SEC
BH CV ECHO MEAS - MV E MAX VEL: 58.7 CM/SEC
BH CV ECHO MEAS - MV E/A: 0.9
BH CV ECHO MEAS - MV P1/2T MAX VEL: 71.2 CM/SEC
BH CV ECHO MEAS - MV P1/2T: 84.1 MSEC
BH CV ECHO MEAS - MVA P1/2T LCG: 3.1 CM^2
BH CV ECHO MEAS - MVA(P1/2T): 2.6 CM^2
BH CV ECHO MEAS - RAP SYSTOLE: 10 MMHG
BH CV ECHO MEAS - RVSP: 40 MMHG
BH CV ECHO MEAS - SI(AO): 109.3 ML/M^2
BH CV ECHO MEAS - SI(CUBED): 19 ML/M^2
BH CV ECHO MEAS - SI(LVOT): 40.3 ML/M^2
BH CV ECHO MEAS - SI(MOD-SP4): 15.7 ML/M^2
BH CV ECHO MEAS - SI(TEICH): 18.3 ML/M^2
BH CV ECHO MEAS - SV(AO): 235.2 ML
BH CV ECHO MEAS - SV(CUBED): 40.9 ML
BH CV ECHO MEAS - SV(LVOT): 86.7 ML
BH CV ECHO MEAS - SV(MOD-SP4): 33.7 ML
BH CV ECHO MEAS - SV(TEICH): 39.5 ML
BH CV ECHO MEAS - TR MAX VEL: 274 CM/SEC
BH CV ECHO MEASUREMENTS AVERAGE E/E' RATIO: 6.52
LEFT ATRIUM VOLUME INDEX: 24.9 ML/M2
MAXIMAL PREDICTED HEART RATE: 146 BPM
STRESS TARGET HR: 124 BPM

## 2020-01-01 ENCOUNTER — APPOINTMENT (OUTPATIENT)
Dept: GENERAL RADIOLOGY | Age: 75
DRG: 282 | End: 2020-01-01
Payer: MEDICARE

## 2020-01-01 ENCOUNTER — LAB (OUTPATIENT)
Dept: LAB | Facility: HOSPITAL | Age: 75
End: 2020-01-01

## 2020-01-01 ENCOUNTER — APPOINTMENT (OUTPATIENT)
Dept: PULMONOLOGY | Facility: HOSPITAL | Age: 75
End: 2020-01-01

## 2020-01-01 ENCOUNTER — HOSPITAL ENCOUNTER (OUTPATIENT)
Dept: PULMONOLOGY | Facility: HOSPITAL | Age: 75
Discharge: HOME | End: 2020-12-30
Admitting: FAMILY MEDICINE

## 2020-01-01 ENCOUNTER — HOSPITAL ENCOUNTER (OUTPATIENT)
Dept: CT IMAGING | Facility: HOSPITAL | Age: 75
End: 2020-01-01

## 2020-01-01 ENCOUNTER — OFFICE VISIT (OUTPATIENT)
Dept: FAMILY MEDICINE CLINIC | Facility: CLINIC | Age: 75
End: 2020-01-01

## 2020-01-01 ENCOUNTER — TRANSCRIBE ORDERS (OUTPATIENT)
Dept: LAB | Facility: HOSPITAL | Age: 75
End: 2020-01-01

## 2020-01-01 ENCOUNTER — OFFICE VISIT (OUTPATIENT)
Dept: CARDIOLOGY | Age: 75
End: 2020-01-01
Payer: MEDICARE

## 2020-01-01 ENCOUNTER — HOSPITAL ENCOUNTER (INPATIENT)
Age: 75
LOS: 2 days | Discharge: HOME | DRG: 282 | End: 2020-11-15
Attending: EMERGENCY MEDICINE | Admitting: HOSPITALIST
Payer: MEDICARE

## 2020-01-01 VITALS
HEIGHT: 73 IN | BODY MASS INDEX: 26.27 KG/M2 | DIASTOLIC BLOOD PRESSURE: 74 MMHG | WEIGHT: 198.2 LBS | OXYGEN SATURATION: 98 % | RESPIRATION RATE: 18 BRPM | HEART RATE: 76 BPM | SYSTOLIC BLOOD PRESSURE: 124 MMHG | TEMPERATURE: 97.5 F

## 2020-01-01 VITALS
HEIGHT: 73 IN | OXYGEN SATURATION: 93 % | WEIGHT: 194.06 LBS | SYSTOLIC BLOOD PRESSURE: 89 MMHG | RESPIRATION RATE: 12 BRPM | BODY MASS INDEX: 25.72 KG/M2 | DIASTOLIC BLOOD PRESSURE: 51 MMHG | HEART RATE: 59 BPM | TEMPERATURE: 97.2 F

## 2020-01-01 VITALS
TEMPERATURE: 97.1 F | SYSTOLIC BLOOD PRESSURE: 118 MMHG | HEIGHT: 73 IN | HEART RATE: 75 BPM | WEIGHT: 196 LBS | DIASTOLIC BLOOD PRESSURE: 72 MMHG | BODY MASS INDEX: 25.98 KG/M2 | OXYGEN SATURATION: 97 % | RESPIRATION RATE: 16 BRPM

## 2020-01-01 VITALS — DIASTOLIC BLOOD PRESSURE: 82 MMHG | HEART RATE: 80 BPM | SYSTOLIC BLOOD PRESSURE: 120 MMHG

## 2020-01-01 DIAGNOSIS — Z01.818 PRE-OP TESTING: Primary | ICD-10-CM

## 2020-01-01 DIAGNOSIS — I25.10 CORONARY ARTERY DISEASE INVOLVING NATIVE CORONARY ARTERY OF NATIVE HEART WITHOUT ANGINA PECTORIS: ICD-10-CM

## 2020-01-01 DIAGNOSIS — R06.02 SHORTNESS OF BREATH: ICD-10-CM

## 2020-01-01 DIAGNOSIS — R94.31 ABNORMAL ELECTROCARDIOGRAM (ECG) (EKG): ICD-10-CM

## 2020-01-01 DIAGNOSIS — R42 VERTIGO: ICD-10-CM

## 2020-01-01 DIAGNOSIS — I10 ESSENTIAL HYPERTENSION: Primary | Chronic | ICD-10-CM

## 2020-01-01 DIAGNOSIS — Z79.01 ANTICOAGULATED: ICD-10-CM

## 2020-01-01 DIAGNOSIS — Z87.891 HISTORY OF TOBACCO USE: ICD-10-CM

## 2020-01-01 DIAGNOSIS — N40.1 BENIGN PROSTATIC HYPERPLASIA WITH URINARY OBSTRUCTION: ICD-10-CM

## 2020-01-01 DIAGNOSIS — R06.02 SHORTNESS OF BREATH: Primary | ICD-10-CM

## 2020-01-01 DIAGNOSIS — N13.8 BENIGN PROSTATIC HYPERPLASIA WITH URINARY OBSTRUCTION: ICD-10-CM

## 2020-01-01 DIAGNOSIS — I10 ESSENTIAL HYPERTENSION: Chronic | ICD-10-CM

## 2020-01-01 DIAGNOSIS — I10 ESSENTIAL HYPERTENSION: Primary | ICD-10-CM

## 2020-01-01 LAB
ALBUMIN SERPL-MCNC: 4.3 G/DL (ref 3.5–5.2)
ALP BLD-CCNC: 60 U/L (ref 40–130)
ALT SERPL-CCNC: 18 U/L (ref 5–41)
ANION GAP SERPL CALCULATED.3IONS-SCNC: 9 MMOL/L (ref 7–19)
ANION GAP SERPL CALCULATED.3IONS-SCNC: 9 MMOL/L (ref 7–19)
APTT: 32.3 SEC (ref 26–36.2)
APTT: 62 SEC (ref 26–36.2)
APTT: 63.8 SEC (ref 26–36.2)
APTT: 70.4 SEC (ref 26–36.2)
APTT: 75.5 SEC (ref 26–36.2)
ARTERIAL PATENCY WRIST A: POSITIVE
AST SERPL-CCNC: 31 U/L (ref 5–40)
ATMOSPHERIC PRESS: 751 MMHG
BASE EXCESS BLDA CALC-SCNC: 0 MMOL/L (ref 0–2)
BASOPHILS ABSOLUTE: 0 K/UL (ref 0–0.2)
BASOPHILS RELATIVE PERCENT: 0.3 % (ref 0–1)
BDY SITE: ABNORMAL
BILIRUB SERPL-MCNC: 0.5 MG/DL (ref 0.2–1.2)
BODY TEMPERATURE: 37 C
BUN BLDV-MCNC: 19 MG/DL (ref 8–23)
BUN BLDV-MCNC: 22 MG/DL (ref 8–23)
CALCIUM SERPL-MCNC: 9.1 MG/DL (ref 8.8–10.2)
CALCIUM SERPL-MCNC: 9.1 MG/DL (ref 8.8–10.2)
CHLORIDE BLD-SCNC: 101 MMOL/L (ref 98–111)
CHLORIDE BLD-SCNC: 103 MMOL/L (ref 98–111)
CHOLESTEROL, TOTAL: 120 MG/DL (ref 160–199)
CO2: 23 MMOL/L (ref 22–29)
CO2: 25 MMOL/L (ref 22–29)
CREAT SERPL-MCNC: 0.8 MG/DL (ref 0.5–1.2)
CREAT SERPL-MCNC: 1.1 MG/DL (ref 0.5–1.2)
D DIMER: 0.42 UG/ML FEU (ref 0–0.48)
EKG P AXIS: 54 DEGREES
EKG P AXIS: 62 DEGREES
EKG P-R INTERVAL: 150 MS
EKG P-R INTERVAL: 156 MS
EKG Q-T INTERVAL: 360 MS
EKG Q-T INTERVAL: 434 MS
EKG QRS DURATION: 82 MS
EKG QRS DURATION: 86 MS
EKG QTC CALCULATION (BAZETT): 417 MS
EKG QTC CALCULATION (BAZETT): 419 MS
EKG T AXIS: 12 DEGREES
EKG T AXIS: 27 DEGREES
EOSINOPHILS ABSOLUTE: 0.3 K/UL (ref 0–0.6)
EOSINOPHILS RELATIVE PERCENT: 2.4 % (ref 0–5)
GFR AFRICAN AMERICAN: >59
GFR AFRICAN AMERICAN: >59
GFR NON-AFRICAN AMERICAN: >60
GFR NON-AFRICAN AMERICAN: >60
GLUCOSE BLD-MCNC: 108 MG/DL (ref 74–109)
GLUCOSE BLD-MCNC: 117 MG/DL (ref 74–109)
HBA1C MFR BLD: 5.8 % (ref 4–6)
HCO3 BLDA-SCNC: 24.8 MMOL/L (ref 20–26)
HCT VFR BLD CALC: 42 % (ref 42–52)
HCT VFR BLD CALC: 45.1 % (ref 42–52)
HDLC SERPL-MCNC: 34 MG/DL (ref 55–121)
HEMOGLOBIN: 13.9 G/DL (ref 14–18)
HEMOGLOBIN: 15 G/DL (ref 14–18)
IMMATURE GRANULOCYTES #: 0.1 K/UL
LDL CHOLESTEROL CALCULATED: 71 MG/DL
LV EF: 58 %
LVEF MODALITY: NORMAL
LYMPHOCYTES ABSOLUTE: 1.5 K/UL (ref 1.1–4.5)
LYMPHOCYTES RELATIVE PERCENT: 13.3 % (ref 20–40)
Lab: ABNORMAL
MAGNESIUM: 2.1 MG/DL (ref 1.6–2.4)
MCH RBC QN AUTO: 29.4 PG (ref 27–31)
MCH RBC QN AUTO: 29.6 PG (ref 27–31)
MCHC RBC AUTO-ENTMCNC: 33.1 G/DL (ref 33–37)
MCHC RBC AUTO-ENTMCNC: 33.3 G/DL (ref 33–37)
MCV RBC AUTO: 88.3 FL (ref 80–94)
MCV RBC AUTO: 89.4 FL (ref 80–94)
MODALITY: ABNORMAL
MONOCYTES ABSOLUTE: 1.1 K/UL (ref 0–0.9)
MONOCYTES RELATIVE PERCENT: 9.6 % (ref 0–10)
NEUTROPHILS ABSOLUTE: 8.2 K/UL (ref 1.5–7.5)
NEUTROPHILS RELATIVE PERCENT: 73.9 % (ref 50–65)
PCO2 BLDA: 39.7 MM HG (ref 35–45)
PCO2 TEMP ADJ BLD: 39.7 MM HG (ref 35–45)
PDW BLD-RTO: 13.2 % (ref 11.5–14.5)
PDW BLD-RTO: 13.2 % (ref 11.5–14.5)
PH BLDA: 7.4 PH UNITS (ref 7.35–7.45)
PH, TEMP CORRECTED: 7.4 PH UNITS (ref 7.35–7.45)
PLATELET # BLD: 177 K/UL (ref 130–400)
PLATELET # BLD: 188 K/UL (ref 130–400)
PMV BLD AUTO: 9.6 FL (ref 9.4–12.4)
PMV BLD AUTO: 9.9 FL (ref 9.4–12.4)
PO2 BLDA: 75.7 MM HG (ref 83–108)
PO2 TEMP ADJ BLD: 75.7 MM HG (ref 83–108)
POTASSIUM REFLEX MAGNESIUM: 4 MMOL/L (ref 3.5–5)
POTASSIUM REFLEX MAGNESIUM: 4.2 MMOL/L (ref 3.5–5)
PRO-BNP: 238 PG/ML (ref 0–1800)
RBC # BLD: 4.7 M/UL (ref 4.7–6.1)
RBC # BLD: 5.11 M/UL (ref 4.7–6.1)
REASON FOR REJECTION: NORMAL
REJECTED TEST: NORMAL
SAO2 % BLDCOA: 96.2 % (ref 94–99)
SARS-COV-2 ORF1AB RESP QL NAA+PROBE: NOT DETECTED
SODIUM BLD-SCNC: 135 MMOL/L (ref 136–145)
SODIUM BLD-SCNC: 135 MMOL/L (ref 136–145)
TOTAL PROTEIN: 7 G/DL (ref 6.6–8.7)
TRIGL SERPL-MCNC: 74 MG/DL (ref 0–149)
TROPONIN: 0.34 NG/ML (ref 0–0.03)
TROPONIN: 0.35 NG/ML (ref 0–0.03)
TROPONIN: 0.35 NG/ML (ref 0–0.03)
TROPONIN: 0.36 NG/ML (ref 0–0.03)
TROPONIN: 0.38 NG/ML (ref 0–0.03)
TROPONIN: 0.39 NG/ML (ref 0–0.03)
VENTILATOR MODE: ABNORMAL
WBC # BLD: 11 K/UL (ref 4.8–10.8)
WBC # BLD: 11.1 K/UL (ref 4.8–10.8)

## 2020-01-01 PROCEDURE — 6370000000 HC RX 637 (ALT 250 FOR IP): Performed by: HOSPITALIST

## 2020-01-01 PROCEDURE — 94729 DIFFUSING CAPACITY: CPT | Performed by: INTERNAL MEDICINE

## 2020-01-01 PROCEDURE — 36415 COLL VENOUS BLD VENIPUNCTURE: CPT

## 2020-01-01 PROCEDURE — C1894 INTRO/SHEATH, NON-LASER: HCPCS

## 2020-01-01 PROCEDURE — 1036F TOBACCO NON-USER: CPT | Performed by: INTERNAL MEDICINE

## 2020-01-01 PROCEDURE — 1111F DSCHRG MED/CURRENT MED MERGE: CPT | Performed by: INTERNAL MEDICINE

## 2020-01-01 PROCEDURE — 3017F COLORECTAL CA SCREEN DOC REV: CPT | Performed by: INTERNAL MEDICINE

## 2020-01-01 PROCEDURE — 94060 EVALUATION OF WHEEZING: CPT | Performed by: INTERNAL MEDICINE

## 2020-01-01 PROCEDURE — 83036 HEMOGLOBIN GLYCOSYLATED A1C: CPT

## 2020-01-01 PROCEDURE — 85730 THROMBOPLASTIN TIME PARTIAL: CPT

## 2020-01-01 PROCEDURE — G8417 CALC BMI ABV UP PARAM F/U: HCPCS | Performed by: INTERNAL MEDICINE

## 2020-01-01 PROCEDURE — 6360000002 HC RX W HCPCS: Performed by: EMERGENCY MEDICINE

## 2020-01-01 PROCEDURE — 99213 OFFICE O/P EST LOW 20 MIN: CPT | Performed by: FAMILY MEDICINE

## 2020-01-01 PROCEDURE — 99214 OFFICE O/P EST MOD 30 MIN: CPT | Performed by: INTERNAL MEDICINE

## 2020-01-01 PROCEDURE — 80053 COMPREHEN METABOLIC PANEL: CPT

## 2020-01-01 PROCEDURE — C9803 HOPD COVID-19 SPEC COLLECT: HCPCS | Performed by: FAMILY MEDICINE

## 2020-01-01 PROCEDURE — 94726 PLETHYSMOGRAPHY LUNG VOLUMES: CPT | Performed by: INTERNAL MEDICINE

## 2020-01-01 PROCEDURE — 93458 L HRT ARTERY/VENTRICLE ANGIO: CPT | Performed by: INTERNAL MEDICINE

## 2020-01-01 PROCEDURE — 80061 LIPID PANEL: CPT

## 2020-01-01 PROCEDURE — 6370000000 HC RX 637 (ALT 250 FOR IP): Performed by: EMERGENCY MEDICINE

## 2020-01-01 PROCEDURE — 2580000003 HC RX 258: Performed by: INTERNAL MEDICINE

## 2020-01-01 PROCEDURE — 83735 ASSAY OF MAGNESIUM: CPT

## 2020-01-01 PROCEDURE — G8427 DOCREV CUR MEDS BY ELIG CLIN: HCPCS | Performed by: INTERNAL MEDICINE

## 2020-01-01 PROCEDURE — 2140000000 HC CCU INTERMEDIATE R&B

## 2020-01-01 PROCEDURE — 93458 L HRT ARTERY/VENTRICLE ANGIO: CPT

## 2020-01-01 PROCEDURE — C1769 GUIDE WIRE: HCPCS

## 2020-01-01 PROCEDURE — G8484 FLU IMMUNIZE NO ADMIN: HCPCS | Performed by: INTERNAL MEDICINE

## 2020-01-01 PROCEDURE — 4040F PNEUMOC VAC/ADMIN/RCVD: CPT | Performed by: INTERNAL MEDICINE

## 2020-01-01 PROCEDURE — 2500000003 HC RX 250 WO HCPCS

## 2020-01-01 PROCEDURE — 84484 ASSAY OF TROPONIN QUANT: CPT

## 2020-01-01 PROCEDURE — 93005 ELECTROCARDIOGRAM TRACING: CPT | Performed by: EMERGENCY MEDICINE

## 2020-01-01 PROCEDURE — 6370000000 HC RX 637 (ALT 250 FOR IP): Performed by: INTERNAL MEDICINE

## 2020-01-01 PROCEDURE — 82803 BLOOD GASES ANY COMBINATION: CPT

## 2020-01-01 PROCEDURE — 80048 BASIC METABOLIC PNL TOTAL CA: CPT

## 2020-01-01 PROCEDURE — 99152 MOD SED SAME PHYS/QHP 5/>YRS: CPT

## 2020-01-01 PROCEDURE — 99214 OFFICE O/P EST MOD 30 MIN: CPT | Performed by: FAMILY MEDICINE

## 2020-01-01 PROCEDURE — 99232 SBSQ HOSP IP/OBS MODERATE 35: CPT | Performed by: INTERNAL MEDICINE

## 2020-01-01 PROCEDURE — 6360000004 HC RX CONTRAST MEDICATION: Performed by: HOSPITALIST

## 2020-01-01 PROCEDURE — 99999 PR OFFICE/OUTPT VISIT,PROCEDURE ONLY: CPT | Performed by: EMERGENCY MEDICINE

## 2020-01-01 PROCEDURE — 94729 DIFFUSING CAPACITY: CPT

## 2020-01-01 PROCEDURE — 4A023N7 MEASUREMENT OF CARDIAC SAMPLING AND PRESSURE, LEFT HEART, PERCUTANEOUS APPROACH: ICD-10-PCS | Performed by: INTERNAL MEDICINE

## 2020-01-01 PROCEDURE — 99152 MOD SED SAME PHYS/QHP 5/>YRS: CPT | Performed by: INTERNAL MEDICINE

## 2020-01-01 PROCEDURE — B2151ZZ FLUOROSCOPY OF LEFT HEART USING LOW OSMOLAR CONTRAST: ICD-10-PCS | Performed by: INTERNAL MEDICINE

## 2020-01-01 PROCEDURE — 85379 FIBRIN DEGRADATION QUANT: CPT

## 2020-01-01 PROCEDURE — B2111ZZ FLUOROSCOPY OF MULTIPLE CORONARY ARTERIES USING LOW OSMOLAR CONTRAST: ICD-10-PCS | Performed by: INTERNAL MEDICINE

## 2020-01-01 PROCEDURE — 94726 PLETHYSMOGRAPHY LUNG VOLUMES: CPT

## 2020-01-01 PROCEDURE — 36600 WITHDRAWAL OF ARTERIAL BLOOD: CPT

## 2020-01-01 PROCEDURE — 2580000003 HC RX 258: Performed by: HOSPITALIST

## 2020-01-01 PROCEDURE — 2709999900 HC NON-CHARGEABLE SUPPLY

## 2020-01-01 PROCEDURE — 93306 TTE W/DOPPLER COMPLETE: CPT

## 2020-01-01 PROCEDURE — 71045 X-RAY EXAM CHEST 1 VIEW: CPT

## 2020-01-01 PROCEDURE — 93010 ELECTROCARDIOGRAM REPORT: CPT | Performed by: INTERNAL MEDICINE

## 2020-01-01 PROCEDURE — 1123F ACP DISCUSS/DSCN MKR DOCD: CPT | Performed by: INTERNAL MEDICINE

## 2020-01-01 PROCEDURE — 85027 COMPLETE CBC AUTOMATED: CPT

## 2020-01-01 PROCEDURE — 99024 POSTOP FOLLOW-UP VISIT: CPT | Performed by: INTERNAL MEDICINE

## 2020-01-01 PROCEDURE — 94060 EVALUATION OF WHEEZING: CPT

## 2020-01-01 PROCEDURE — 85025 COMPLETE CBC W/AUTO DIFF WBC: CPT

## 2020-01-01 PROCEDURE — 6360000002 HC RX W HCPCS

## 2020-01-01 PROCEDURE — 99223 1ST HOSP IP/OBS HIGH 75: CPT | Performed by: INTERNAL MEDICINE

## 2020-01-01 PROCEDURE — 83880 ASSAY OF NATRIURETIC PEPTIDE: CPT

## 2020-01-01 PROCEDURE — 99285 EMERGENCY DEPT VISIT HI MDM: CPT

## 2020-01-01 PROCEDURE — U0004 COV-19 TEST NON-CDC HGH THRU: HCPCS | Performed by: FAMILY MEDICINE

## 2020-01-01 RX ORDER — ASPIRIN 81 MG/1
81 TABLET, CHEWABLE ORAL DAILY
Qty: 30 TABLET | Refills: 3 | Status: SHIPPED | OUTPATIENT
Start: 2020-01-01

## 2020-01-01 RX ORDER — ATORVASTATIN CALCIUM 40 MG/1
40 TABLET, FILM COATED ORAL NIGHTLY
COMMUNITY
End: 2020-01-01 | Stop reason: SDUPTHER

## 2020-01-01 RX ORDER — HYDRALAZINE HYDROCHLORIDE 20 MG/ML
10 INJECTION INTRAMUSCULAR; INTRAVENOUS EVERY 10 MIN PRN
Status: DISCONTINUED | OUTPATIENT
Start: 2020-01-01 | End: 2020-01-01 | Stop reason: HOSPADM

## 2020-01-01 RX ORDER — ACETAMINOPHEN 325 MG/1
650 TABLET ORAL EVERY 6 HOURS PRN
Status: DISCONTINUED | OUTPATIENT
Start: 2020-01-01 | End: 2020-01-01 | Stop reason: HOSPADM

## 2020-01-01 RX ORDER — HEPARIN SODIUM 1000 [USP'U]/ML
4000 INJECTION, SOLUTION INTRAVENOUS; SUBCUTANEOUS ONCE
Status: COMPLETED | OUTPATIENT
Start: 2020-01-01 | End: 2020-01-01

## 2020-01-01 RX ORDER — CAPTOPRIL 25 MG/1
25 TABLET ORAL 2 TIMES DAILY
Status: DISCONTINUED | OUTPATIENT
Start: 2020-01-01 | End: 2020-01-01

## 2020-01-01 RX ORDER — HYDROCHLOROTHIAZIDE 12.5 MG/1
12.5 CAPSULE, GELATIN COATED ORAL EVERY MORNING
Status: ON HOLD | COMMUNITY
End: 2020-01-01 | Stop reason: HOSPADM

## 2020-01-01 RX ORDER — ISOSORBIDE MONONITRATE 30 MG/1
30 TABLET, EXTENDED RELEASE ORAL DAILY
Qty: 90 TABLET | Refills: 3 | Status: SHIPPED | OUTPATIENT
Start: 2020-01-01 | End: 2021-01-01

## 2020-01-01 RX ORDER — HEPARIN SODIUM 1000 [USP'U]/ML
4000 INJECTION, SOLUTION INTRAVENOUS; SUBCUTANEOUS PRN
Status: DISCONTINUED | OUTPATIENT
Start: 2020-01-01 | End: 2020-01-01 | Stop reason: HOSPADM

## 2020-01-01 RX ORDER — ISOSORBIDE MONONITRATE 30 MG/1
30 TABLET, EXTENDED RELEASE ORAL DAILY
Status: DISCONTINUED | OUTPATIENT
Start: 2020-01-01 | End: 2020-01-01 | Stop reason: HOSPADM

## 2020-01-01 RX ORDER — CLOPIDOGREL BISULFATE 75 MG/1
75 TABLET ORAL DAILY
Status: DISCONTINUED | OUTPATIENT
Start: 2020-01-01 | End: 2020-01-01 | Stop reason: HOSPADM

## 2020-01-01 RX ORDER — ACETAMINOPHEN 650 MG/1
650 SUPPOSITORY RECTAL EVERY 6 HOURS PRN
Status: DISCONTINUED | OUTPATIENT
Start: 2020-01-01 | End: 2020-01-01 | Stop reason: HOSPADM

## 2020-01-01 RX ORDER — ISOSORBIDE DINITRATE 30 MG/1
30 TABLET ORAL 4 TIMES DAILY
Qty: 360 TABLET | Refills: 1 | OUTPATIENT
Start: 2020-01-01

## 2020-01-01 RX ORDER — SODIUM CHLORIDE 0.9 % (FLUSH) 0.9 %
10 SYRINGE (ML) INJECTION EVERY 12 HOURS SCHEDULED
Status: DISCONTINUED | OUTPATIENT
Start: 2020-01-01 | End: 2020-01-01 | Stop reason: SDUPTHER

## 2020-01-01 RX ORDER — SODIUM CHLORIDE 0.9 % (FLUSH) 0.9 %
10 SYRINGE (ML) INJECTION PRN
Status: DISCONTINUED | OUTPATIENT
Start: 2020-01-01 | End: 2020-01-01 | Stop reason: SDUPTHER

## 2020-01-01 RX ORDER — ONDANSETRON 2 MG/ML
4 INJECTION INTRAMUSCULAR; INTRAVENOUS EVERY 6 HOURS PRN
Status: DISCONTINUED | OUTPATIENT
Start: 2020-01-01 | End: 2020-01-01 | Stop reason: SDUPTHER

## 2020-01-01 RX ORDER — CLOPIDOGREL BISULFATE 75 MG/1
75 TABLET ORAL DAILY
Qty: 90 TABLET | Refills: 1 | OUTPATIENT
Start: 2020-01-01

## 2020-01-01 RX ORDER — CLOPIDOGREL BISULFATE 75 MG/1
75 TABLET ORAL DAILY
Qty: 30 TABLET | Refills: 3 | Status: SHIPPED | OUTPATIENT
Start: 2020-01-01

## 2020-01-01 RX ORDER — CAPTOPRIL 25 MG/1
25 TABLET ORAL 2 TIMES DAILY
Status: ON HOLD | COMMUNITY
End: 2020-01-01 | Stop reason: HOSPADM

## 2020-01-01 RX ORDER — CLOPIDOGREL BISULFATE 75 MG/1
75 TABLET ORAL DAILY
COMMUNITY
End: 2020-01-01 | Stop reason: SDUPTHER

## 2020-01-01 RX ORDER — CAPTOPRIL 25 MG/1
6.25 TABLET ORAL 2 TIMES DAILY
Status: DISCONTINUED | OUTPATIENT
Start: 2020-01-01 | End: 2020-01-01

## 2020-01-01 RX ORDER — PROMETHAZINE HYDROCHLORIDE 25 MG/1
12.5 TABLET ORAL EVERY 6 HOURS PRN
Status: DISCONTINUED | OUTPATIENT
Start: 2020-01-01 | End: 2020-01-01 | Stop reason: HOSPADM

## 2020-01-01 RX ORDER — ISOSORBIDE DINITRATE 30 MG/1
30 TABLET ORAL 4 TIMES DAILY
COMMUNITY
End: 2020-01-01 | Stop reason: ALTCHOICE

## 2020-01-01 RX ORDER — ALBUTEROL SULFATE 2.5 MG/3ML
2.5 SOLUTION RESPIRATORY (INHALATION) ONCE
Status: COMPLETED | OUTPATIENT
Start: 2020-01-01 | End: 2020-01-01

## 2020-01-01 RX ORDER — SODIUM CHLORIDE 0.9 % (FLUSH) 0.9 %
10 SYRINGE (ML) INJECTION EVERY 12 HOURS SCHEDULED
Status: DISCONTINUED | OUTPATIENT
Start: 2020-01-01 | End: 2020-01-01 | Stop reason: HOSPADM

## 2020-01-01 RX ORDER — ONDANSETRON 2 MG/ML
4 INJECTION INTRAMUSCULAR; INTRAVENOUS EVERY 6 HOURS PRN
Status: DISCONTINUED | OUTPATIENT
Start: 2020-01-01 | End: 2020-01-01 | Stop reason: HOSPADM

## 2020-01-01 RX ORDER — POLYETHYLENE GLYCOL 3350 17 G/17G
17 POWDER, FOR SOLUTION ORAL DAILY PRN
Status: DISCONTINUED | OUTPATIENT
Start: 2020-01-01 | End: 2020-01-01 | Stop reason: HOSPADM

## 2020-01-01 RX ORDER — ATORVASTATIN CALCIUM 40 MG/1
40 TABLET, FILM COATED ORAL NIGHTLY
Qty: 90 TABLET | Refills: 1 | OUTPATIENT
Start: 2020-01-01

## 2020-01-01 RX ORDER — SODIUM CHLORIDE 0.9 % (FLUSH) 0.9 %
10 SYRINGE (ML) INJECTION PRN
Status: DISCONTINUED | OUTPATIENT
Start: 2020-01-01 | End: 2020-01-01 | Stop reason: HOSPADM

## 2020-01-01 RX ORDER — ATORVASTATIN CALCIUM 40 MG/1
40 TABLET, FILM COATED ORAL NIGHTLY
Status: DISCONTINUED | OUTPATIENT
Start: 2020-01-01 | End: 2020-01-01 | Stop reason: HOSPADM

## 2020-01-01 RX ORDER — ASPIRIN 81 MG/1
81 TABLET, CHEWABLE ORAL DAILY
Status: DISCONTINUED | OUTPATIENT
Start: 2020-01-01 | End: 2020-01-01

## 2020-01-01 RX ORDER — MAGNESIUM SULFATE IN WATER 40 MG/ML
2 INJECTION, SOLUTION INTRAVENOUS PRN
Status: DISCONTINUED | OUTPATIENT
Start: 2020-01-01 | End: 2020-01-01 | Stop reason: HOSPADM

## 2020-01-01 RX ORDER — SODIUM CHLORIDE 9 MG/ML
INJECTION, SOLUTION INTRAVENOUS CONTINUOUS
Status: DISCONTINUED | OUTPATIENT
Start: 2020-01-01 | End: 2020-01-01

## 2020-01-01 RX ORDER — FINASTERIDE 5 MG/1
5 TABLET, FILM COATED ORAL DAILY
COMMUNITY

## 2020-01-01 RX ORDER — HEPARIN SODIUM 1000 [USP'U]/ML
2000 INJECTION, SOLUTION INTRAVENOUS; SUBCUTANEOUS PRN
Status: DISCONTINUED | OUTPATIENT
Start: 2020-01-01 | End: 2020-01-01 | Stop reason: HOSPADM

## 2020-01-01 RX ORDER — SODIUM CHLORIDE 9 MG/ML
1000 INJECTION, SOLUTION INTRAVENOUS CONTINUOUS
Status: DISCONTINUED | OUTPATIENT
Start: 2020-01-01 | End: 2020-01-01

## 2020-01-01 RX ORDER — FINASTERIDE 5 MG/1
5 TABLET, FILM COATED ORAL DAILY
Status: DISCONTINUED | OUTPATIENT
Start: 2020-01-01 | End: 2020-01-01 | Stop reason: HOSPADM

## 2020-01-01 RX ORDER — POTASSIUM CHLORIDE 7.45 MG/ML
10 INJECTION INTRAVENOUS PRN
Status: DISCONTINUED | OUTPATIENT
Start: 2020-01-01 | End: 2020-01-01 | Stop reason: HOSPADM

## 2020-01-01 RX ORDER — POTASSIUM CHLORIDE 20 MEQ/1
40 TABLET, EXTENDED RELEASE ORAL PRN
Status: DISCONTINUED | OUTPATIENT
Start: 2020-01-01 | End: 2020-01-01 | Stop reason: HOSPADM

## 2020-01-01 RX ORDER — NITROGLYCERIN 0.4 MG/1
0.4 TABLET SUBLINGUAL EVERY 5 MIN PRN
Status: DISCONTINUED | OUTPATIENT
Start: 2020-01-01 | End: 2020-01-01 | Stop reason: HOSPADM

## 2020-01-01 RX ORDER — ATORVASTATIN CALCIUM 40 MG/1
40 TABLET, FILM COATED ORAL NIGHTLY
Qty: 30 TABLET | Refills: 3 | Status: SHIPPED | OUTPATIENT
Start: 2020-01-01

## 2020-01-01 RX ORDER — ISOSORBIDE MONONITRATE 30 MG/1
30 TABLET, EXTENDED RELEASE ORAL DAILY
Qty: 30 TABLET | Refills: 3 | Status: SHIPPED | OUTPATIENT
Start: 2020-01-01 | End: 2021-01-01 | Stop reason: SDUPTHER

## 2020-01-01 RX ORDER — ACETAMINOPHEN 325 MG/1
650 TABLET ORAL EVERY 4 HOURS PRN
Status: DISCONTINUED | OUTPATIENT
Start: 2020-01-01 | End: 2020-01-01 | Stop reason: HOSPADM

## 2020-01-01 RX ORDER — CLOPIDOGREL BISULFATE 75 MG/1
75 TABLET ORAL DAILY
Qty: 90 TABLET | Refills: 3 | Status: SHIPPED | OUTPATIENT
Start: 2020-01-01 | End: 2021-01-01

## 2020-01-01 RX ORDER — ASPIRIN 81 MG/1
324 TABLET, CHEWABLE ORAL ONCE
Status: COMPLETED | OUTPATIENT
Start: 2020-01-01 | End: 2020-01-01

## 2020-01-01 RX ORDER — METOPROLOL TARTRATE 50 MG/1
50 TABLET, FILM COATED ORAL 2 TIMES DAILY
Qty: 60 TABLET | Refills: 3 | Status: SHIPPED | OUTPATIENT
Start: 2020-01-01 | End: 2021-01-01 | Stop reason: SDUPTHER

## 2020-01-01 RX ORDER — ASPIRIN 81 MG/1
81 TABLET, CHEWABLE ORAL DAILY
COMMUNITY

## 2020-01-01 RX ORDER — FINASTERIDE 5 MG/1
5 TABLET, FILM COATED ORAL DAILY
Qty: 90 TABLET | Refills: 3 | Status: SHIPPED | OUTPATIENT
Start: 2020-01-01 | End: 2021-01-01 | Stop reason: SDUPTHER

## 2020-01-01 RX ORDER — ATORVASTATIN CALCIUM 40 MG/1
40 TABLET, FILM COATED ORAL NIGHTLY
Qty: 90 TABLET | Refills: 1 | Status: SHIPPED | OUTPATIENT
Start: 2020-01-01 | End: 2021-01-01

## 2020-01-01 RX ORDER — ASPIRIN 81 MG/1
81 TABLET, CHEWABLE ORAL DAILY
Status: DISCONTINUED | OUTPATIENT
Start: 2020-01-01 | End: 2020-01-01 | Stop reason: HOSPADM

## 2020-01-01 RX ORDER — ISOSORBIDE MONONITRATE 30 MG/1
30 TABLET, EXTENDED RELEASE ORAL
COMMUNITY
Start: 2020-01-01 | End: 2020-01-01 | Stop reason: SDUPTHER

## 2020-01-01 RX ORDER — ISOSORBIDE MONONITRATE 60 MG/1
60 TABLET, EXTENDED RELEASE ORAL DAILY
Qty: 30 TABLET | Refills: 3 | Status: ON HOLD
Start: 2020-01-01 | End: 2021-01-01 | Stop reason: HOSPADM

## 2020-01-01 RX ORDER — HEPARIN SODIUM 10000 [USP'U]/100ML
11.36 INJECTION, SOLUTION INTRAVENOUS CONTINUOUS
Status: DISCONTINUED | OUTPATIENT
Start: 2020-01-01 | End: 2020-01-01

## 2020-01-01 RX ADMIN — ASPIRIN 81 MG: 81 TABLET, CHEWABLE ORAL at 09:25

## 2020-01-01 RX ADMIN — NITROGLYCERIN 0.4 MG: 0.4 TABLET, ORALLY DISINTEGRATING SUBLINGUAL at 17:49

## 2020-01-01 RX ADMIN — ASPIRIN 81 MG: 81 TABLET, CHEWABLE ORAL at 09:19

## 2020-01-01 RX ADMIN — ALBUTEROL SULFATE 2.5 MG: 2.5 SOLUTION RESPIRATORY (INHALATION) at 09:42

## 2020-01-01 RX ADMIN — CLOPIDOGREL BISULFATE 75 MG: 75 TABLET ORAL at 12:23

## 2020-01-01 RX ADMIN — HEPARIN SODIUM 4000 UNITS: 1000 INJECTION INTRAVENOUS; SUBCUTANEOUS at 08:43

## 2020-01-01 RX ADMIN — METOPROLOL TARTRATE 25 MG: 25 TABLET, FILM COATED ORAL at 20:06

## 2020-01-01 RX ADMIN — CAPTOPRIL 25 MG: 25 TABLET ORAL at 21:29

## 2020-01-01 RX ADMIN — ATORVASTATIN CALCIUM 40 MG: 40 TABLET, FILM COATED ORAL at 23:42

## 2020-01-01 RX ADMIN — FINASTERIDE 5 MG: 5 TABLET, FILM COATED ORAL at 09:30

## 2020-01-01 RX ADMIN — NITROGLYCERIN 0.4 MG: 0.4 TABLET, ORALLY DISINTEGRATING SUBLINGUAL at 09:13

## 2020-01-01 RX ADMIN — METOPROLOL TARTRATE 25 MG: 25 TABLET, FILM COATED ORAL at 12:13

## 2020-01-01 RX ADMIN — CAPTOPRIL 25 MG: 25 TABLET ORAL at 09:25

## 2020-01-01 RX ADMIN — CLOPIDOGREL BISULFATE 75 MG: 75 TABLET ORAL at 09:19

## 2020-01-01 RX ADMIN — ATORVASTATIN CALCIUM 40 MG: 40 TABLET, FILM COATED ORAL at 21:29

## 2020-01-01 RX ADMIN — SODIUM CHLORIDE: 9 INJECTION, SOLUTION INTRAVENOUS at 07:56

## 2020-01-01 RX ADMIN — ASPIRIN 324 MG: 81 TABLET, CHEWABLE ORAL at 08:41

## 2020-01-01 RX ADMIN — SODIUM CHLORIDE, PRESERVATIVE FREE 10 ML: 5 INJECTION INTRAVENOUS at 21:29

## 2020-01-01 RX ADMIN — IOPAMIDOL 90 ML: 612 INJECTION, SOLUTION INTRAVENOUS at 11:19

## 2020-01-01 RX ADMIN — METOPROLOL TARTRATE 25 MG: 25 TABLET, FILM COATED ORAL at 09:25

## 2020-01-01 RX ADMIN — HEPARIN SODIUM 11.36 UNITS/KG/HR: 10000 INJECTION, SOLUTION INTRAVENOUS at 08:11

## 2020-01-01 RX ADMIN — ISOSORBIDE MONONITRATE 30 MG: 30 TABLET, EXTENDED RELEASE ORAL at 12:23

## 2020-01-01 RX ADMIN — HEPARIN SODIUM 11.36 UNITS/KG/HR: 10000 INJECTION, SOLUTION INTRAVENOUS at 08:44

## 2020-01-01 RX ADMIN — CAPTOPRIL 25 MG: 25 TABLET ORAL at 23:42

## 2020-01-01 RX ADMIN — FINASTERIDE 5 MG: 5 TABLET, FILM COATED ORAL at 09:19

## 2020-01-01 RX ADMIN — ISOSORBIDE MONONITRATE 30 MG: 30 TABLET, EXTENDED RELEASE ORAL at 09:19

## 2020-01-01 RX ADMIN — SODIUM CHLORIDE, PRESERVATIVE FREE 10 ML: 5 INJECTION INTRAVENOUS at 09:26

## 2020-01-01 RX ADMIN — SODIUM CHLORIDE, PRESERVATIVE FREE 10 ML: 5 INJECTION INTRAVENOUS at 09:20

## 2020-01-01 RX ADMIN — METOPROLOL TARTRATE 25 MG: 25 TABLET, FILM COATED ORAL at 21:29

## 2020-01-01 SDOH — HEALTH STABILITY: MENTAL HEALTH: HOW OFTEN DO YOU HAVE A DRINK CONTAINING ALCOHOL?: NEVER

## 2020-01-01 ASSESSMENT — ENCOUNTER SYMPTOMS
SHORTNESS OF BREATH: 0
VOICE CHANGE: 0
NAUSEA: 0
VOMITING: 0
VOMITING: 0
EYES NEGATIVE: 1
EYES NEGATIVE: 1
GASTROINTESTINAL NEGATIVE: 1
SHORTNESS OF BREATH: 1
DIARRHEA: 0
NAUSEA: 0
DIARRHEA: 0
EYE REDNESS: 0
COUGH: 0
ABDOMINAL PAIN: 0
RHINORRHEA: 0
RESPIRATORY NEGATIVE: 1
SHORTNESS OF BREATH: 0
RESPIRATORY NEGATIVE: 1
GASTROINTESTINAL NEGATIVE: 1
EYE PAIN: 0

## 2020-01-01 ASSESSMENT — PAIN DESCRIPTION - LOCATION
LOCATION: CHEST
LOCATION: CHEST

## 2020-01-01 ASSESSMENT — PAIN SCALES - GENERAL
PAINLEVEL_OUTOF10: 1
PAINLEVEL_OUTOF10: 0
PAINLEVEL_OUTOF10: 4
PAINLEVEL_OUTOF10: 0
PAINLEVEL_OUTOF10: 0
PAINLEVEL_OUTOF10: 4
PAINLEVEL_OUTOF10: 0
PAINLEVEL_OUTOF10: 0

## 2020-01-01 ASSESSMENT — PAIN DESCRIPTION - PAIN TYPE
TYPE: ACUTE PAIN
TYPE: ACUTE PAIN

## 2020-01-01 ASSESSMENT — PAIN DESCRIPTION - DESCRIPTORS: DESCRIPTORS: ACHING;DISCOMFORT

## 2020-01-01 ASSESSMENT — PAIN DESCRIPTION - ORIENTATION: ORIENTATION: LEFT

## 2020-01-08 DIAGNOSIS — I10 ESSENTIAL HYPERTENSION: ICD-10-CM

## 2020-01-08 DIAGNOSIS — D69.6 THROMBOCYTOPENIA (HCC): ICD-10-CM

## 2020-01-08 DIAGNOSIS — R73.01 IMPAIRED FASTING GLUCOSE: Primary | ICD-10-CM

## 2020-01-16 ENCOUNTER — RESULTS ENCOUNTER (OUTPATIENT)
Dept: FAMILY MEDICINE CLINIC | Facility: CLINIC | Age: 75
End: 2020-01-16

## 2020-01-16 DIAGNOSIS — D69.6 THROMBOCYTOPENIA (HCC): ICD-10-CM

## 2020-01-16 DIAGNOSIS — I10 ESSENTIAL HYPERTENSION: ICD-10-CM

## 2020-01-16 DIAGNOSIS — R73.01 IMPAIRED FASTING GLUCOSE: ICD-10-CM

## 2020-01-17 LAB
BUN SERPL-MCNC: 18 MG/DL (ref 8–23)
BUN/CREAT SERPL: 17.5 (ref 7–25)
CALCIUM SERPL-MCNC: 9.1 MG/DL (ref 8.6–10.5)
CHLORIDE SERPL-SCNC: 102 MMOL/L (ref 98–107)
CO2 SERPL-SCNC: 27 MMOL/L (ref 22–29)
CREAT SERPL-MCNC: 1.03 MG/DL (ref 0.76–1.27)
GLUCOSE SERPL-MCNC: 98 MG/DL (ref 65–99)
HBA1C MFR BLD: 6 % (ref 4.8–5.6)
POTASSIUM SERPL-SCNC: 4.2 MMOL/L (ref 3.5–5.2)
SODIUM SERPL-SCNC: 139 MMOL/L (ref 136–145)

## 2020-05-07 DIAGNOSIS — N40.1 BENIGN PROSTATIC HYPERPLASIA WITH URINARY OBSTRUCTION: ICD-10-CM

## 2020-05-07 DIAGNOSIS — N13.8 BENIGN PROSTATIC HYPERPLASIA WITH URINARY OBSTRUCTION: ICD-10-CM

## 2020-05-08 RX ORDER — HYDROCHLOROTHIAZIDE 12.5 MG/1
TABLET ORAL
Qty: 90 TABLET | Refills: 3 | Status: SHIPPED | OUTPATIENT
Start: 2020-05-08 | End: 2020-01-01

## 2020-05-08 RX ORDER — FINASTERIDE 5 MG/1
5 TABLET, FILM COATED ORAL DAILY
Qty: 90 TABLET | Refills: 1 | Status: SHIPPED | OUTPATIENT
Start: 2020-05-08 | End: 2020-01-01

## 2020-05-08 RX ORDER — CAPTOPRIL 25 MG/1
TABLET ORAL
Qty: 180 TABLET | Refills: 3 | Status: SHIPPED | OUTPATIENT
Start: 2020-05-08 | End: 2020-01-01

## 2020-07-02 ENCOUNTER — RESULTS ENCOUNTER (OUTPATIENT)
Dept: UROLOGY | Facility: CLINIC | Age: 75
End: 2020-07-02

## 2020-07-02 DIAGNOSIS — R97.20 ELEVATED PROSTATE SPECIFIC ANTIGEN (PSA): ICD-10-CM

## 2020-07-16 ENCOUNTER — OFFICE VISIT (OUTPATIENT)
Dept: FAMILY MEDICINE CLINIC | Facility: CLINIC | Age: 75
End: 2020-07-16

## 2020-07-16 VITALS
HEIGHT: 73 IN | DIASTOLIC BLOOD PRESSURE: 92 MMHG | BODY MASS INDEX: 25.71 KG/M2 | OXYGEN SATURATION: 98 % | WEIGHT: 194 LBS | TEMPERATURE: 97.6 F | SYSTOLIC BLOOD PRESSURE: 144 MMHG | RESPIRATION RATE: 16 BRPM | HEART RATE: 86 BPM

## 2020-07-16 DIAGNOSIS — D69.6 THROMBOCYTOPENIA (HCC): ICD-10-CM

## 2020-07-16 DIAGNOSIS — R33.9 URINARY RETENTION: Primary | ICD-10-CM

## 2020-07-16 DIAGNOSIS — R73.01 IMPAIRED FASTING GLUCOSE: ICD-10-CM

## 2020-07-16 DIAGNOSIS — Z00.00 WELLNESS EXAMINATION: ICD-10-CM

## 2020-07-16 DIAGNOSIS — M19.90 OSTEOARTHRITIS, UNSPECIFIED OSTEOARTHRITIS TYPE, UNSPECIFIED SITE: Chronic | ICD-10-CM

## 2020-07-16 DIAGNOSIS — I10 ESSENTIAL HYPERTENSION: Chronic | ICD-10-CM

## 2020-07-16 DIAGNOSIS — R97.20 ELEVATED PSA: ICD-10-CM

## 2020-07-16 PROCEDURE — 99213 OFFICE O/P EST LOW 20 MIN: CPT | Performed by: FAMILY MEDICINE

## 2020-07-16 PROCEDURE — G0439 PPPS, SUBSEQ VISIT: HCPCS | Performed by: FAMILY MEDICINE

## 2020-07-16 RX ORDER — CALCIUM CARBONATE 200(500)MG
1 TABLET,CHEWABLE ORAL DAILY
COMMUNITY

## 2020-07-16 RX ORDER — ACETAMINOPHEN 325 MG/1
650 TABLET ORAL EVERY 6 HOURS PRN
COMMUNITY

## 2020-07-16 RX ORDER — IBUPROFEN 200 MG
200 TABLET ORAL EVERY 6 HOURS PRN
COMMUNITY
End: 2020-01-01

## 2020-07-16 NOTE — PROGRESS NOTES
Subjective   Elver Gordon is a 75 y.o. male presenting with chief complaint of:   Chief Complaint   Patient presents with   • Medicare Wellness-subsequent   • Hypertension       History of Present Illness :  Alone.       Has multiple chronic problems to consider that might have a bearing on today's issues;  an interval appointment.       Chronic/acute problems reviewed today:   1. Urinary retention: zuniga : Chronic/stable.  Benign to date.  Current medications or situation is voiding to completion.   2. Osteoarthritis, unspecified osteoarthritis type, unspecified site Chronic/stable.  Various on/off joint pains/soreness/stiffness.  Particular joint problems with various.  No joint swelling.  Treats mainly with reduced activity, Rx listed, Tylenol.  No pattern NSAIDs, and no injections.      3. Impaired fasting glucose Chronic/stable.  No problem/pattern hypoglycemia/hyperglycemia manifest by poly- dypsia, phagia, uria, or sweats, diaphoretic episodes, syncope/near.     4. Essential hypertension Chronic/stable. Stable here past/no recent home blood pressures.  No significant chest pain, SOB, LE edema, orthopnea, near syncope, dizziness/light headness.   Recent Vitals       7/16/2019 7/25/2019 7/16/2020       BP:  134/82  134/82  144/92 Pt did not take his medication today        Pt did not take his medication today     Pulse:  92  --  86     Temp:  98.5 °F (36.9 °C)  --  97.6 °F (36.4 °C)     Weight:  90.7 kg (200 lb)  90.7 kg (200 lb)  88 kg (194 lb)     BMI (Calculated):  26.4  26.4  25.6            5. Thrombocytopenia (CMS/HCC)    6. Wellness examination-done Chronic ongoing over time screening or advice for general health care/wellness.      7. Elevated PSA-zuniga chronic/variable but benign today.  Followed by Zuniga with upcoming appointment.     Has an/another acute issue today: none.    The following portions of the patient's history were reviewed and updated as appropriate: allergies, current medications, past  family history, past medical history, past social history, past surgical history and problem list.      Current Outpatient Medications:   •  acetaminophen (TYLENOL) 325 MG tablet, Take 650 mg by mouth Every 6 (Six) Hours As Needed for Mild Pain ., Disp: , Rfl:   •  calcium carbonate (TUMS) 500 MG chewable tablet, Chew 1 tablet Daily., Disp: , Rfl:   •  captopril (CAPOTEN) 25 MG tablet, TAKE 1 TABLET BY MOUTH  TWICE A DAY, Disp: 180 tablet, Rfl: 3  •  finasteride (PROSCAR) 5 MG tablet, TAKE 1 TABLET BY MOUTH  DAILY, Disp: 90 tablet, Rfl: 1  •  hydroCHLOROthiazide (HYDRODIURIL) 12.5 MG tablet, TAKE 1 TABLET BY MOUTH  DAILY, Disp: 90 tablet, Rfl: 3  •  ibuprofen (ADVIL,MOTRIN) 200 MG tablet, Take 200 mg by mouth Every 6 (Six) Hours As Needed for Mild Pain ., Disp: , Rfl:     No problems with medications.  Refills if needed done    No Known Allergies    Review of Systems  GENERAL:  Active/slower with limits, speed, stamina for age.  Sleep is ok. No fever now/recent.  ENDO:  No syncope, near or diaphoretic sweaty spells.  HEENT: No head injury or headache.   No vision change.   Same mild tinnitis/hearing loss.  Ears without pain/drainage.  No sore throat.  No significant nasal/sinus congestion/drainage. No epistaxis.  CHEST: No chest wall tenderness or mass. No significant cough, without wheeze, hemoptysis; above SOB  CV: No chest pain, palpitations, ankle edema.  GI: No heartburn, dysphagia.  No abdominal pain, diarrhea, constipation, rectal bleeding, or melena.    :  Voids without dysuria, or incontinence to completion.  ORTHO: No painful/swollen joints but various on /off sore.  No sore neck or back.  No acute neck or back pain without recent injury.   NEURO: No dizziness, weakness of extremities.  No numbness/paresthesias.   PSYCH: No memory loss.  Mood good; not anxious, depressed but/and not suicidal.  Tolerated stress.      Screening:  Mammogram:NA  Bone density: NA  Low dose CT chest: Tobacco-smoker/age 18/1.5  "ppd/dc 85 (22 py): NA  GI: Colon-polyp/Mc/4.2.2018/5y  Prostate: Zuniga confirmed 7.16.20  Zuniga/confirmed/7.17.19  Usual lab order  any labs urology/other MD wants  6m BMP, A1c  12m CBC, CMP, A1c,  LIPID, TSH, Vit D, PSAs    Lab Results:  Results for orders placed or performed in visit on 01/16/20   Basic metabolic panel   Result Value Ref Range    Glucose 98 65 - 99 mg/dL    BUN 18 8 - 23 mg/dL    Creatinine 1.03 0.76 - 1.27 mg/dL    eGFR Non African Am 71 >60 mL/min/1.73    eGFR African Am 86 >60 mL/min/1.73    BUN/Creatinine Ratio 17.5 7.0 - 25.0    Sodium 139 136 - 145 mmol/L    Potassium 4.2 3.5 - 5.2 mmol/L    Chloride 102 98 - 107 mmol/L    Total CO2 27.0 22.0 - 29.0 mmol/L    Calcium 9.1 8.6 - 10.5 mg/dL   Hemoglobin A1c   Result Value Ref Range    Hemoglobin A1C 6.00 (H) 4.80 - 5.60 %       A1C:  Lab Results - Last 18 Months   Lab Units 01/16/20  0710 07/12/19  0758   HEMOGLOBIN A1C % 6.00* 6.00*     PSA:  Lab Results - Last 18 Months   Lab Units 06/28/19  0941   PSA ng/mL 2.9     CBC:  Lab Results - Last 18 Months   Lab Units 07/12/19  0758   WBC 10*3/mm3 8.57   HEMOGLOBIN g/dL 14.8   HEMATOCRIT % 46.0   PLATELETS 10*3/mm3 204      BMP/CMP:  Lab Results - Last 18 Months   Lab Units 01/16/20  0710 07/12/19  0758   SODIUM mmol/L 139 139   POTASSIUM mmol/L 4.2 4.2   CHLORIDE mmol/L 102 102   TOTAL CO2 mmol/L 27.0 25.9   GLUCOSE mg/dL 98 121*   BUN mg/dL 18 20   CREATININE mg/dL 1.03 1.06   EGFR IF NONAFRICN AM mL/min/1.73 71 68   EGFR IF AFRICN AM mL/min/1.73 86 83   CALCIUM mg/dL 9.1 9.2     HEPATIC:  Lab Results - Last 18 Months   Lab Units 07/12/19  0758   ALT (SGPT) U/L 13   AST (SGOT) U/L 15   ALK PHOS U/L 60     THYROID:  Lab Results - Last 18 Months   Lab Units 07/12/19  0758   TSH mIU/mL 4.090       Objective   /92 Comment: Pt did not take his medication today  Pulse 86   Temp 97.6 °F (36.4 °C)   Resp 16   Ht 185.4 cm (73\")   Wt 88 kg (194 lb)   SpO2 98%   BMI 25.60 kg/m²   Body mass index " is 25.6 kg/m².    Recent Vitals       7/16/2019 7/25/2019 7/16/2020       BP:  134/82  134/82  144/92 Pt did not take his medication today        Pt did not take his medication today     Pulse:  92  --  86     Temp:  98.5 °F (36.9 °C)  --  97.6 °F (36.4 °C)     Weight:  90.7 kg (200 lb)  90.7 kg (200 lb)  88 kg (194 lb)     BMI (Calculated):  26.4  26.4  25.6           Physical Exam  GENERAL:  Well nourished/developed in no acute distress.  SKIN: Turgor excellent, without wound, rash, lesion.  HEENT: Normal cephalic without trauma.  Pupils equal round reactive to light. Extraocular motions full without nystagmus.   External canals nonobstructive nontender without reddness. Tymphatic membranes annie with coby structures intact.   Oral cavity without growths, exudates, and moist.  Posterior pharynx without mass, obstruction, redness.  No thyromegaly, mass, tenderness, lymphadenopathy and supple.  CV: Regular rhythm.  No murmur, gallop,  edema. Posterior pulses intact.  No carotid bruits.  CHEST: No chest wall tenderness or mass.   LUNGS: Symmetric motion with clear to auscultation.  No dullness to percussion  ABD: Soft, nontender without mass.   ORTHO: Symmetric extremities without swelling/point tenderness.  Full gross range of motion..  NEURO: CN 2-12 grossly intact.  Symmetric facies. 1/4 x bicep knee ankle equal reflexes.  UE/LE   3-4/5 strength throughout.  Nonfocal use extremities. Speech clear.  Finger/nose and tandom walking ok.  Turning head; causes room to spin.     PSYCH: Oriented x 3.  Pleasant calm, well kept.  Purposeful/directed conservation with intact short/long gross memory.       Assessment/Plan     1. Urinary retention: astudillo    2. Osteoarthritis, unspecified osteoarthritis type, unspecified site    3. Impaired fasting glucose    4. Essential hypertension    5. Thrombocytopenia (CMS/HCC)    6. Wellness examination-done    7. Elevated PSA-astudillo        Discussions:   Wellness  Home bp  "monitoring    Rx: reviewed/changes:  No orders of the defined types were placed in this encounter.      LAB/Testing/Referrals: reviewed/orders:   Today:   Orders Placed This Encounter   Procedures   • Comprehensive metabolic panel   • Lipid Panel With LDL/HDL Ratio   • TSH   • Hemoglobin A1c   • PSA, Total & Free   • CBC and Differential     Chronic/recurrent labs above or change to:   same     Body mass index is 25.6 kg/m².  Patient's Body mass index is 25.6 kg/m². BMI is within normal parameters. No follow-up required..    Tobacco use reviewed:    Non-smoker  Elver Gordon  reports that he quit smoking about 33 years ago. His smoking use included cigarettes. He started smoking about 58 years ago. He has a 22.50 pack-year smoking history. He has never used smokeless tobacco..       Annual/wellness visit: also/today (see separate note)    Patient Instructions     Medicare/insurances offer certain visits called \"wellness/annual\" that allows for time to deal with and  review the many aspects of \"being well\" that just might not get mentioned during other visits with your doctor through the year.  This includes things like reviews of health screenings (mammograms, various labs),  weight, exercise, vaccines for just a few examples.      In order to help you with this we wish to make you aware of a few things for you to consider:    1. Advanced directives.  These are documents used to help direct your care if your health/situation should reach a point that you cannot make your own decisions.  While it is likely you do not currently have a need for these documents now; it is something that we all might face at any time.   The hand outs you are being given today are simply for you to review and use to learn more about these documents and consider them as you wish.      2. Vaccines: Certain vaccines are important after age 50, 60, and 65 and some health situations (for example COPD), require even boosters beyond age 65.  We " are happy to review with you your vaccine status and vaccines that might be needed for you at this point:      a. Tetanus.   Like anyone this needs to given every 10 years; sooner for/with lacerations/wounds.   Likely when getting this booster it needs to be a tetanus called Tdap (tetanus mixed with diptheria and pertussis).   Years ago you had this vaccine.  We now know we can lose our immunity to pertussis (a part of this vaccine) and run a risk of catching this.  Now only would this make us ill; but more importantly we can spread this to very young children (and for them it can be a much more dangerous illness).   We call this the grandparent vaccine for this reason.     b. Pneumonia (strept).   This comes now with two brands.   It is recommended to take pneumovax first; and a year later take the cousin prevnar.  Even if you have had these before; we need to review when and your current health situation/s as you may need boosters and even recently the CDC has made recent/new recommendations for pneumovax.      c. Shingles.  You do not want to catch shingles.  Though you will recover from this; the pain associated with shingles can be severe.  Even if you have had the now older zostavax, or have had shingles; it is recommended you still get the Shingrix (the new vaccine just available early 2018 shingles vaccine).  A new shingles vaccine (a shot to lower your chance of catching shingles) is now available (shingrix).  This vaccine is the second vaccine created for this purpose; (we have had zostavax for years).  Shingrix provides a much better and longer immunity for shingles than zostavax.  For this and other reasons Shingrix can be started at age 50.  If you have had zostavax in the past; you can still take Shingrix.      This vaccine is not paid for in a doctor's office by medicare, medicaid and probably most insurances.  Like zostavax; this is covered in drug stores.  This is a vaccine that if you chose to get  you need to get at a drug store that gives vaccines (like [facility], [facility] and [facility]      d. Yearly flu vaccine given from September through April each year (there is a special vaccine for those over 65).     e. Travel vaccines:  If you are one to do international travel; be sure and ask us for any particular unusual vaccines you may need.     f.  Miscellaneous:  If you have certain health situations/disease you may need specific/particular vaccines not give to the general public.     The vaccines we have on record for you include:   Immunization History   Administered Date(s) Administered   • Fluzone High Dose =>65 Years ([employer] ONLY) 09/27/2017   • Pneumococcal Conjugate 13-Valent (PCV13) 06/19/2018   • Pneumococcal, Unspecified 02/04/2007   • Zostavax 10/01/2016       If you have record of other vaccines and want them to show in your chart here; please talk to our nurses about having your vaccine record updated.     3. Exercise: regular cardio exercise something everyone should consider and try to do; even if health limitations (ie find that exercise UE/LE/cardio that they can tolerate).   Normal weight a goal for everyone (as we discussed)    4. Healthy diet helpful for weight management, illness prevention.     5. If over 50-screening exams include men PSA/rectal exam, women mammograms, and everyone colonoscopy screening for colon cancer.    6. If you use tobacco of any kind or e-products you should stop. We are providing you some information to consider that could make this process easier.      ##################################              Follow up: Return for lab today/, 6 m;, Dr Lorenzo-, 12m;.  Future Appointments   Date Time Provider Department Center   8/19/2020  9:00 AM [facility] None   8/26/2020  3:00 PM Kwaku Zuniga MD [facility] None

## 2020-07-16 NOTE — PATIENT INSTRUCTIONS
"Medicare/insurances offer certain visits called \"wellness/annual\" that allows for time to deal with and  review the many aspects of \"being well\" that just might not get mentioned during other visits with your doctor through the year.  This includes things like reviews of health screenings (mammograms, various labs),  weight, exercise, vaccines for just a few examples.      In order to help you with this we wish to make you aware of a few things for you to consider:    1. Advanced directives.  These are documents used to help direct your care if your health/situation should reach a point that you cannot make your own decisions.  While it is likely you do not currently have a need for these documents now; it is something that we all might face at any time.   The hand outs you are being given today are simply for you to review and use to learn more about these documents and consider them as you wish.      2. Vaccines: Certain vaccines are important after age 50, 60, and 65 and some health situations (for example COPD), require even boosters beyond age 65.  We are happy to review with you your vaccine status and vaccines that might be needed for you at this point:      a. Tetanus.   Like anyone this needs to given every 10 years; sooner for/with lacerations/wounds.   Likely when getting this booster it needs to be a tetanus called Tdap (tetanus mixed with diptheria and pertussis).   Years ago you had this vaccine.  We now know we can lose our immunity to pertussis (a part of this vaccine) and run a risk of catching this.  Now only would this make us ill; but more importantly we can spread this to very young children (and for them it can be a much more dangerous illness).   We call this the grandparent vaccine for this reason.     b. Pneumonia (strept).   This comes now with two brands.   It is recommended to take pneumovax first; and a year later take the cousin prevnar.  Even if you have had these before; we need to " review when and your current health situation/s as you may need boosters and even recently the CDC has made recent/new recommendations for pneumovax.      c. Shingles.  You do not want to catch shingles.  Though you will recover from this; the pain associated with shingles can be severe.  Even if you have had the now older zostavax, or have had shingles; it is recommended you still get the Shingrix (the new vaccine just available early 2018 shingles vaccine).  A new shingles vaccine (a shot to lower your chance of catching shingles) is now available (shingrix).  This vaccine is the second vaccine created for this purpose; (we have had zostavax for years).  Shingrix provides a much better and longer immunity for shingles than zostavax.  For this and other reasons Shingrix can be started at age 50.  If you have had zostavax in the past; you can still take Shingrix.      This vaccine is not paid for in a doctor's office by medicare, medicaid and probably most insurances.  Like zostavax; this is covered in drug stores.  This is a vaccine that if you chose to get you need to get at a drug store that gives vaccines (like [facility], [facility] and [employer].      d. Yearly flu vaccine given from September through April each year (there is a special vaccine for those over 65).     e. Travel vaccines:  If you are one to do international travel; be sure and ask us for any particular unusual vaccines you may need.     f.  Miscellaneous:  If you have certain health situations/disease you may need specific/particular vaccines not give to the general public.     The vaccines we have on record for you include:   Immunization History   Administered Date(s) Administered   • Fluzone High Dose =>65 Years ([employer] ONLY) 09/27/2017   • Pneumococcal Conjugate 13-Valent (PCV13) 06/19/2018   • Pneumococcal, Unspecified 02/04/2007   • Zostavax 10/01/2016       If you have record of other vaccines and want them to show in  your chart here; please talk to our nurses about having your vaccine record updated.     3. Exercise: regular cardio exercise something everyone should consider and try to do; even if health limitations (ie find that exercise UE/LE/cardio that they can tolerate).   Normal weight a goal for everyone (as we discussed)    4. Healthy diet helpful for weight management, illness prevention.     5. If over 50-screening exams include men PSA/rectal exam, women mammograms, and everyone colonoscopy screening for colon cancer.    6. If you use tobacco of any kind or e-products you should stop. We are providing you some information to consider that could make this process easier.      ##################################

## 2020-07-18 LAB
ALBUMIN SERPL-MCNC: 4.7 G/DL (ref 3.5–5.2)
ALBUMIN/GLOB SERPL: 2.6 G/DL
ALP SERPL-CCNC: 56 U/L (ref 39–117)
ALT SERPL-CCNC: 17 U/L (ref 1–41)
AST SERPL-CCNC: 16 U/L (ref 1–40)
BASOPHILS # BLD AUTO: 0.05 10*3/MM3 (ref 0–0.2)
BASOPHILS NFR BLD AUTO: 0.6 % (ref 0–1.5)
BILIRUB SERPL-MCNC: 0.6 MG/DL (ref 0–1.2)
BUN SERPL-MCNC: 21 MG/DL (ref 8–23)
BUN/CREAT SERPL: 22.6 (ref 7–25)
CALCIUM SERPL-MCNC: 9.6 MG/DL (ref 8.6–10.5)
CHLORIDE SERPL-SCNC: 102 MMOL/L (ref 98–107)
CHOLEST SERPL-MCNC: 165 MG/DL (ref 0–200)
CO2 SERPL-SCNC: 24.7 MMOL/L (ref 22–29)
CREAT SERPL-MCNC: 0.93 MG/DL (ref 0.76–1.27)
EOSINOPHIL # BLD AUTO: 0.14 10*3/MM3 (ref 0–0.4)
EOSINOPHIL NFR BLD AUTO: 1.8 % (ref 0.3–6.2)
ERYTHROCYTE [DISTWIDTH] IN BLOOD BY AUTOMATED COUNT: 13.2 % (ref 12.3–15.4)
GLOBULIN SER CALC-MCNC: 1.8 GM/DL
GLUCOSE SERPL-MCNC: 103 MG/DL (ref 65–99)
HBA1C MFR BLD: 5.9 % (ref 4.8–5.6)
HCT VFR BLD AUTO: 45.3 % (ref 37.5–51)
HDLC SERPL-MCNC: 39 MG/DL (ref 40–60)
HGB BLD-MCNC: 15.2 G/DL (ref 13–17.7)
IMM GRANULOCYTES # BLD AUTO: 0.02 10*3/MM3 (ref 0–0.05)
IMM GRANULOCYTES NFR BLD AUTO: 0.3 % (ref 0–0.5)
LDLC SERPL CALC-MCNC: 111 MG/DL (ref 0–100)
LDLC/HDLC SERPL: 2.86 {RATIO}
LYMPHOCYTES # BLD AUTO: 1.97 10*3/MM3 (ref 0.7–3.1)
LYMPHOCYTES NFR BLD AUTO: 25.6 % (ref 19.6–45.3)
MCH RBC QN AUTO: 29.5 PG (ref 26.6–33)
MCHC RBC AUTO-ENTMCNC: 33.6 G/DL (ref 31.5–35.7)
MCV RBC AUTO: 87.8 FL (ref 79–97)
MONOCYTES # BLD AUTO: 0.67 10*3/MM3 (ref 0.1–0.9)
MONOCYTES NFR BLD AUTO: 8.7 % (ref 5–12)
NEUTROPHILS # BLD AUTO: 4.86 10*3/MM3 (ref 1.7–7)
NEUTROPHILS NFR BLD AUTO: 63 % (ref 42.7–76)
NRBC BLD AUTO-RTO: 0 /100 WBC (ref 0–0.2)
PLATELET # BLD AUTO: 178 10*3/MM3 (ref 140–450)
POTASSIUM SERPL-SCNC: 4.4 MMOL/L (ref 3.5–5.2)
PROT SERPL-MCNC: 6.5 G/DL (ref 6–8.5)
PSA FREE MFR SERPL: 26.6 %
PSA FREE SERPL-MCNC: 0.77 NG/ML
PSA SERPL-MCNC: 2.9 NG/ML (ref 0–4)
RBC # BLD AUTO: 5.16 10*6/MM3 (ref 4.14–5.8)
SODIUM SERPL-SCNC: 138 MMOL/L (ref 136–145)
TRIGL SERPL-MCNC: 73 MG/DL (ref 0–150)
TSH SERPL DL<=0.005 MIU/L-ACNC: 2.18 UIU/ML (ref 0.27–4.2)
VLDLC SERPL CALC-MCNC: 14.6 MG/DL
WBC # BLD AUTO: 7.71 10*3/MM3 (ref 3.4–10.8)

## 2020-07-20 NOTE — PROGRESS NOTES
The ABCs of the Annual Wellness Visit  Subsequent Medicare Wellness Visit    Chief Complaint   Patient presents with   • Medicare Wellness-subsequent   • Hypertension       Subjective   History of Present Illness:  Elver Gordon is a 75 y.o. male who presents for a Subsequent Medicare Wellness Visit.    HEALTH RISK ASSESSMENT    Recent Hospitalizations:  No hospitalization(s) within the last year.    Current Medical Providers:  Patient Care Team:  Andre Lorenzo MD as PCP - General  Andre Lorenzo MD as PCP - Claims Attributed  Kwaku Zuniga MD as Consulting Physician (Urology)  David Morin MD as Consulting Physician (Otolaryngology)  Margo Alamo APRN as Nurse Practitioner (Otolaryngology)    Smoking Status:  Social History     Tobacco Use   Smoking Status Former Smoker   • Packs/day: 1.50   • Years: 15.00   • Pack years: 22.50   • Types: Cigarettes   • Start date:    • Last attempt to quit: 1987   • Years since quittin.1   Smokeless Tobacco Never Used       Alcohol Consumption:  Social History     Substance and Sexual Activity   Alcohol Use No       Depression Screen:   PHQ-2/PHQ-9 Depression Screening 2020   Little interest or pleasure in doing things 0   Feeling down, depressed, or hopeless 0   Total Score 0       Fall Risk Screen:  STEADI Fall Risk Assessment was completed, and patient is at MODERATE risk for falls. Assessment completed on:2020    Health Habits and Functional and Cognitive Screening:  No flowsheet data found.      Does the patient have evidence of cognitive impairment? No    Asprin use counseling:Does not need ASA (and currently is not on it)    Age-appropriate Screening Schedule:  Refer to the list below for future screening recommendations based on patient's age, sex and/or medical conditions. Orders for these recommended tests are listed in the plan section. The patient has been provided with a written plan.    Health Maintenance   Topic  Date Due   • TDAP/TD VACCINES (1 - Tdap) 05/02/1956   • ZOSTER VACCINE (2 of 3) 11/26/2016   • INFLUENZA VACCINE  08/01/2020   • LIPID PANEL  07/16/2021   • COLONOSCOPY  04/02/2023          The following portions of the patient's history were reviewed and updated as appropriate: allergies, current medications, past family history, past medical history, past social history, past surgical history and problem list.    Outpatient Medications Prior to Visit   Medication Sig Dispense Refill   • acetaminophen (TYLENOL) 325 MG tablet Take 650 mg by mouth Every 6 (Six) Hours As Needed for Mild Pain .     • calcium carbonate (TUMS) 500 MG chewable tablet Chew 1 tablet Daily.     • captopril (CAPOTEN) 25 MG tablet TAKE 1 TABLET BY MOUTH  TWICE A  tablet 3   • finasteride (PROSCAR) 5 MG tablet TAKE 1 TABLET BY MOUTH  DAILY 90 tablet 1   • hydroCHLOROthiazide (HYDRODIURIL) 12.5 MG tablet TAKE 1 TABLET BY MOUTH  DAILY 90 tablet 3   • ibuprofen (ADVIL,MOTRIN) 200 MG tablet Take 200 mg by mouth Every 6 (Six) Hours As Needed for Mild Pain .     • esomeprazole (nexIUM) 20 MG capsule Take 20 mg by mouth Every Morning Before Breakfast.     • omeprazole OTC (PRILOSEC OTC) 20 MG EC tablet Take 20 mg by mouth Daily.       No facility-administered medications prior to visit.        Patient Active Problem List   Diagnosis   • Wellness examination-done   • Hypertension   • Impaired fasting glucose   • Elevated PSA-astudillo   • Urinary retention: astudillo   • Thrombocytopenia (CMS/HCC)   • Degenerative joint disease   • Vertigo   • Claustrophobia   • Hx of adenomatous colonic polyps   • Laboratory test*   • SOB: cough,    • History of tobacco use   • History of joint replacement       Advanced Care Planning:  ACP discussion was held with the patient during this visit. Patient does not have an advance directive, information provided.    Review of Systems  GENERAL:  Active/slower with limits, speed, stamina for age.  Sleep is ok. No fever  "now/recent.  ENDO:  No syncope, near or diaphoretic sweaty spells.  HEENT: No head injury or headache.   No vision change.   Same mild tinnitis/hearing loss.  Ears without pain/drainage.  No sore throat.  No significant nasal/sinus congestion/drainage. No epistaxis.  CHEST: No chest wall tenderness or mass. No significant cough, without wheeze, hemoptysis; above SOB  CV: No chest pain, palpitations, ankle edema.  GI: No heartburn, dysphagia.  No abdominal pain, diarrhea, constipation, rectal bleeding, or melena.    :  Voids without dysuria, or incontinence to completion.  ORTHO: No painful/swollen joints but various on /off sore.  No sore neck or back.  No acute neck or back pain without recent injury.   NEURO: No dizziness, weakness of extremities.  No numbness/paresthesias.   PSYCH: No memory loss.  Mood good; not anxious, depressed but/and not suicidal.  Tolerated stress.      Compared to one year ago, the patient feels his physical health is the same.  Compared to one year ago, the patient feels his mental health is the same.    Reviewed chart for potential of high risk medication in the elderly: yes  Reviewed chart for potential of harmful drug interactions in the elderly:yes    Objective         Vitals:    07/16/20 0913   BP: 144/92  Comment: Pt did not take his medication today   Pulse: 86   Resp: 16   Temp: 97.6 °F (36.4 °C)   SpO2: 98%   Weight: 88 kg (194 lb)   Height: 185.4 cm (73\")       Body mass index is 25.6 kg/m².  Discussed the patient's BMI with him. The BMI is in the acceptable range.    Physical Exam  GENERAL:  Well nourished/developed in no acute distress.  SKIN: Turgor excellent, without wound, rash, lesion.  HEENT: Normal cephalic without trauma.  Pupils equal round reactive to light. Extraocular motions full without nystagmus.   External canals nonobstructive nontender without reddness. Tymphatic membranes annie with coby structures intact.   Oral cavity without growths, exudates, and " moist.  Posterior pharynx without mass, obstruction, redness.  No thyromegaly, mass, tenderness, lymphadenopathy and supple.  CV: Regular rhythm.  No murmur, gallop,  edema. Posterior pulses intact.  No carotid bruits.  CHEST: No chest wall tenderness or mass.   LUNGS: Symmetric motion with clear to auscultation.  No dullness to percussion  ABD: Soft, nontender without mass.   ORTHO: Symmetric extremities without swelling/point tenderness.  Full gross range of motion..  NEURO: CN 2-12 grossly intact.  Symmetric facies. 1/4 x bicep knee ankle equal reflexes.  UE/LE   3-4/5 strength throughout.  Nonfocal use extremities. Speech clear.  Finger/nose and tandom walking ok.  Turning head; causes room to spin.     PSYCH: Oriented x 3.  Pleasant calm, well kept.  Purposeful/directed conservation with intact short/long gross memory.     Lab Results   Component Value Date     (H) 07/16/2020    CHLPL 165 07/16/2020    TRIG 73 07/16/2020    HDL 39 (L) 07/16/2020     (H) 07/16/2020    VLDL 14.6 07/16/2020    HGBA1C 5.90 (H) 07/16/2020        Assessment/Plan   Medicare Risks and Personalized Health Plan  CMS Preventative Services Quick Reference  Advance Directive Discussion  Immunizations Discussed/Encouraged (specific immunizations; adacel Tdap, Influenza, Pneumococcal 23, Prevnar and Shingrix )    The above risks/problems have been discussed with the patient.  Pertinent information has been shared with the patient in the After Visit Summary.  Follow up plans and orders are seen below in the Assessment/Plan Section.    Diagnoses and all orders for this visit:    1. Urinary retention: astudillo (Primary)    2. Osteoarthritis, unspecified osteoarthritis type, unspecified site    3. Impaired fasting glucose  -     Comprehensive metabolic panel  -     Lipid Panel With LDL/HDL Ratio  -     CBC and Differential  -     TSH  -     Hemoglobin A1c    4. Essential hypertension  -     Comprehensive metabolic panel  -     Lipid  Panel With LDL/HDL Ratio  -     CBC and Differential  -     TSH  -     Hemoglobin A1c    5. Thrombocytopenia (CMS/HCC)    6. Wellness examination-done    7. Elevated PSA-astudillo  -     PSA, Total & Free      Follow Up:  Return for lab today/, 6 m;, Dr Lorenzo-, 12m;.     An After Visit Summary and PPPS were given to the patient.

## 2020-10-21 NOTE — TELEPHONE ENCOUNTER
Requested Prescriptions     Pending Prescriptions Disp Refills   • finasteride (PROSCAR) 5 MG tablet [Pharmacy Med Name: FINASTERIDE 5MG TABLET] 90 tablet 3     Sig: TAKE 1 TABLET BY MOUTH  DAILY

## 2020-11-05 NOTE — PROGRESS NOTES
Subjective   Elver Gordon is a 75 y.o. male presenting with chief complaint of:   Chief Complaint   Patient presents with   • Shortness of Breath     worsening over the last year. worse with exhertion       History of Present Illness :  Alone.       Has multiple chronic problems to consider that might have a bearing on today's issues;  an interval appointment.       Chronic/acute problems reviewed today:   1. Shortness of breath chronic episode over 6 months and slowly a little more noticeable.  Present with exertion not present with rest.  No cough or wheeze.  Previous years of smoking   2. Essential hypertension Chronic/stable. Stable here past/and ok home blood pressures.  No significant chest pain, SOB, LE edema, orthopnea, near syncope, dizziness/light headness.   Recent Vitals       7/25/2019 7/16/2020 11/5/2020       BP:  134/82  144/92  118/72       Pt did not take his medication today      Pulse:  --  86  75     Temp:  --  97.6 °F (36.4 °C)  97.1 °F (36.2 °C)     Weight:  90.7 kg (200 lb)  88 kg (194 lb)  88.9 kg (196 lb)     BMI (Calculated):  26.4  25.6  25.9            3. History of tobacco use past history of smoking quitting several years ago.  To this point did not seem that had caused any symptoms.     Has an/another acute issue today: none.    The following portions of the patient's history were reviewed and updated as appropriate: allergies, current medications, past family history, past medical history, past social history, past surgical history and problem list.      Current Outpatient Medications:   •  acetaminophen (TYLENOL) 325 MG tablet, Take 650 mg by mouth Every 6 (Six) Hours As Needed for Mild Pain ., Disp: , Rfl:   •  calcium carbonate (TUMS) 500 MG chewable tablet, Chew 1 tablet Daily., Disp: , Rfl:   •  captopril (CAPOTEN) 25 MG tablet, TAKE 1 TABLET BY MOUTH  TWICE A DAY, Disp: 180 tablet, Rfl: 3  •  finasteride (PROSCAR) 5 MG tablet, TAKE 1 TABLET BY MOUTH  DAILY, Disp: 90 tablet,  Rfl: 3  •  hydroCHLOROthiazide (HYDRODIURIL) 12.5 MG tablet, TAKE 1 TABLET BY MOUTH  DAILY, Disp: 90 tablet, Rfl: 3  •  ibuprofen (ADVIL,MOTRIN) 200 MG tablet, Take 200 mg by mouth Every 6 (Six) Hours As Needed for Mild Pain ., Disp: , Rfl:     No problems with medications.  Refills if needed done    No Known Allergies    Review of Systems  GENERAL:  Active/slower with limits, speed, stamina for age.   Sleep is ok. No fever now.  ENDO:  No syncope, near or diaphoretic sweaty spells.  HEENT: No head injury or headache.  No vision change.   Same mild tinnitis/hearing loss.  Ears without pain/drainage.  No sore throat.  No significant nasal/sinus congestion/drainage. No epistaxis.  CHEST: No chest wall tenderness or mass. No significant cough, without wheeze, hemoptysis; above SOB  CV: No chest pain, palpitations, ankle edema.  GI: No heartburn, dysphagia.  No abdominal pain, diarrhea, constipation, rectal bleeding, or melena.    :  Voids without dysuria, or incontinence to completion.  ORTHO: No painful/swollen joints but various on /off sore.  No sore neck or back.  No acute neck or back pain without recent injury.   NEURO: No dizziness, weakness of extremities.  No numbness/paresthesias.   PSYCH: No memory loss.  Mood good; not anxious, depressed but/and not suicidal.  Tolerated stress.      Screening:  Mammogram:NA  Bone density: NA  Low dose CT chest: Tobacco-smoker/age 18/1.5 ppd/dc 85 (22 py): NA  GI: Colon-polyp/Mc/4.2.2018/5y  Prostate: Zuniga/confirmed 11.5.20  Zuniga/confirmed/7.17.19  Usual lab order  any labs urology/other MD wants  6m BMP, A1c  12m CBC, CMP, A1c,  LIPID, TSH, Vit D, PSAs      Lab Results:  Results for orders placed or performed in visit on 07/16/20   Comprehensive metabolic panel    Specimen: Blood   Result Value Ref Range    Glucose 103 (H) 65 - 99 mg/dL    BUN 21 8 - 23 mg/dL    Creatinine 0.93 0.76 - 1.27 mg/dL    eGFR Non African Am 79 >60 mL/min/1.73    eGFR African Am 96 >60  mL/min/1.73    BUN/Creatinine Ratio 22.6 7.0 - 25.0    Sodium 138 136 - 145 mmol/L    Potassium 4.4 3.5 - 5.2 mmol/L    Chloride 102 98 - 107 mmol/L    Total CO2 24.7 22.0 - 29.0 mmol/L    Calcium 9.6 8.6 - 10.5 mg/dL    Total Protein 6.5 6.0 - 8.5 g/dL    Albumin 4.70 3.50 - 5.20 g/dL    Globulin 1.8 gm/dL    A/G Ratio 2.6 g/dL    Total Bilirubin 0.6 0.0 - 1.2 mg/dL    Alkaline Phosphatase 56 39 - 117 U/L    AST (SGOT) 16 1 - 40 U/L    ALT (SGPT) 17 1 - 41 U/L   Lipid Panel With LDL/HDL Ratio    Specimen: Blood   Result Value Ref Range    Total Cholesterol 165 0 - 200 mg/dL    Triglycerides 73 0 - 150 mg/dL    HDL Cholesterol 39 (L) 40 - 60 mg/dL    VLDL Cholesterol 14.6 mg/dL    LDL Cholesterol  111 (H) 0 - 100 mg/dL    LDL/HDL Ratio 2.86    TSH    Specimen: Blood   Result Value Ref Range    TSH 2.180 0.270 - 4.200 uIU/mL   Hemoglobin A1c    Specimen: Blood   Result Value Ref Range    Hemoglobin A1C 5.90 (H) 4.80 - 5.60 %   PSA, Total & Free    Specimen: Blood   Result Value Ref Range    PSA 2.9 0.0 - 4.0 ng/mL    PSA, Free 0.77 N/A ng/mL    PSA, Free % 26.6 %   CBC and Differential    Specimen: Blood   Result Value Ref Range    WBC 7.71 3.40 - 10.80 10*3/mm3    RBC 5.16 4.14 - 5.80 10*6/mm3    Hemoglobin 15.2 13.0 - 17.7 g/dL    Hematocrit 45.3 37.5 - 51.0 %    MCV 87.8 79.0 - 97.0 fL    MCH 29.5 26.6 - 33.0 pg    MCHC 33.6 31.5 - 35.7 g/dL    RDW 13.2 12.3 - 15.4 %    Platelets 178 140 - 450 10*3/mm3    Neutrophil Rel % 63.0 42.7 - 76.0 %    Lymphocyte Rel % 25.6 19.6 - 45.3 %    Monocyte Rel % 8.7 5.0 - 12.0 %    Eosinophil Rel % 1.8 0.3 - 6.2 %    Basophil Rel % 0.6 0.0 - 1.5 %    Neutrophils Absolute 4.86 1.70 - 7.00 10*3/mm3    Lymphocytes Absolute 1.97 0.70 - 3.10 10*3/mm3    Monocytes Absolute 0.67 0.10 - 0.90 10*3/mm3    Eosinophils Absolute 0.14 0.00 - 0.40 10*3/mm3    Basophils Absolute 0.05 0.00 - 0.20 10*3/mm3    Immature Granulocyte Rel % 0.3 0.0 - 0.5 %    Immature Grans Absolute 0.02 0.00 - 0.05  "10*3/mm3    nRBC 0.0 0.0 - 0.2 /100 WBC       A1C:  Lab Results - Last 18 Months   Lab Units 07/16/20  0850 01/16/20  0710 07/12/19  0758   HEMOGLOBIN A1C % 5.90* 6.00* 6.00*     PSA:  Lab Results - Last 18 Months   Lab Units 07/16/20  0850 06/28/19  0941   PSA ng/mL 2.9 2.9     CBC:  Lab Results - Last 18 Months   Lab Units 07/16/20  0850 07/12/19  0758   WBC 10*3/mm3 7.71 8.57   HEMOGLOBIN g/dL 15.2 14.8   HEMATOCRIT % 45.3 46.0   PLATELETS 10*3/mm3 178 204      BMP/CMP:  Lab Results - Last 18 Months   Lab Units 07/16/20  0850 01/16/20  0710 07/12/19  0758   SODIUM mmol/L 138 139 139   POTASSIUM mmol/L 4.4 4.2 4.2   CHLORIDE mmol/L 102 102 102   TOTAL CO2 mmol/L 24.7 27.0 25.9   GLUCOSE mg/dL 103* 98 121*   BUN mg/dL 21 18 20   CREATININE mg/dL 0.93 1.03 1.06   EGFR IF NONAFRICN AM mL/min/1.73 79 71 68   EGFR IF AFRICN AM mL/min/1.73 96 86 83   CALCIUM mg/dL 9.6 9.1 9.2     HEPATIC:  Lab Results - Last 18 Months   Lab Units 07/16/20  0850 07/12/19  0758   ALT (SGPT) U/L 17 13   AST (SGOT) U/L 16 15   ALK PHOS U/L 56 60     THYROID:  Lab Results - Last 18 Months   Lab Units 07/16/20  0850 07/12/19  0758   TSH uIU/mL 2.180 4.090       Objective   /72   Pulse 75   Temp 97.1 °F (36.2 °C)   Resp 16   Ht 185.4 cm (73\")   Wt 88.9 kg (196 lb)   SpO2 97%   BMI 25.86 kg/m²   Body mass index is 25.86 kg/m².    Recent Vitals       7/25/2019 7/16/2020 11/5/2020       BP:  134/82  144/92  118/72       Pt did not take his medication today      Pulse:  --  86  75     Temp:  --  97.6 °F (36.4 °C)  97.1 °F (36.2 °C)     Weight:  90.7 kg (200 lb)  88 kg (194 lb)  88.9 kg (196 lb)     BMI (Calculated):  26.4  25.6  25.9           Physical Exam  GENERAL:  Well nourished/developed in no acute distress.  SKIN: Turgor excellent, without wound, rash, lesion.  HEENT: Normal cephalic without trauma.  Pupils equal round reactive to light. Extraocular motions full without nystagmus.   External canals nonobstructive nontender " without reddness. Tymphatic membranes annie with coby structures intact.   Oral cavity without growths, exudates, and moist.  Posterior pharynx without mass, obstruction, redness.  No thyromegaly, mass, tenderness, lymphadenopathy and supple.  CV: Regular rhythm.  No murmur, gallop,  edema. Posterior pulses intact.  No carotid bruits.  CHEST: No chest wall tenderness or mass.   LUNGS: Symmetric motion with clear to auscultation.    ABD: Soft, nontender without mass.   ORTHO: Symmetric extremities without swelling/point tenderness.  Full gross range of motion..  NEURO: CN 2-12 grossly intact.  Symmetric facies. 1/4 x bicep knee ankle equal reflexes.  UE/LE   3-4/5 strength throughout.  Nonfocal use extremities. Speech clear.  Finger/nose and tandom walking ok.  Turning head; causes room to spin.     PSYCH: Oriented x 3.  Pleasant calm, well kept.  Purposeful/directed conservation with intact short/long gross memory.     Assessment/Plan     1. Shortness of breath    2. Essential hypertension    3. History of tobacco use        Discussions:   Sounds as if has a chronic lung/or heart issue  Start below testing    Rx: reviewed/changes:  No orders of the defined types were placed in this encounter.    LAB/Testing/Referrals: reviewed/orders:   Today:   Orders Placed This Encounter   Procedures   • CT Chest With Contrast   • Adult Transthoracic Echo Complete W/ Cont if Necessary Per Protocol   • Full Pulmonary Function Test With Bronchodilator & ABG   EST/echo    Chronic/recurrent labs above or change to:   same     Body mass index is 25.86 kg/m².  Patient's Body mass index is 25.86 kg/m². BMI is within normal parameters. No follow-up required..      Tobacco use reviewed:    Elver Lrram  reports that he quit smoking about 33 years ago. His smoking use included cigarettes. He started smoking about 58 years ago. He has a 22.50 pack-year smoking history. He has never used smokeless tobacco..    There are no Patient  Instructions on file for this visit.    Follow up: Return for will see how testing/or treatment goes and decide; he will call for apt after all tests done.  Future Appointments   Date Time Provider Department Center   1/15/2021  8:20 AM [facility]   7/20/2021  9:00 AM Andre Lorenzo MD [facility]       Procedures none

## 2020-11-13 PROBLEM — I21.4 NSTEMI (NON-ST ELEVATED MYOCARDIAL INFARCTION) (HCC): Status: ACTIVE | Noted: 2020-01-01

## 2020-11-13 NOTE — ED PROVIDER NOTES
[facility] EMERGENCY DEPT  EMERGENCY DEPARTMENT ENCOUNTER      Pt Name: Mariann Pelaez  MRN: [id number]  [place] 1945  Date of evaluation: 11/13/2020  Provider: Dinora Aparicio MD    CHIEF COMPLAINT       Chief Complaint   Patient presents with    Chest Pain    Shortness of Breath         HISTORY OF PRESENT ILLNESS   (Location/Symptom, Timing/Onset,Context/Setting, Quality, Duration, Modifying Factors, Severity)  Note limiting factors. Mariann Pelaez is a 76 y.o. male who presents to the emergency department with complaint of chest pain that has been present over the last 2 to 3 days. States he has had exertional shortness of breath gradually worsening over the last 3 months. Patient saw his primary care provider recently for the symptoms and is scheduled for several tests next month to look at his heart. Patient states he has been busy clearing [employer] over the last few days and was having pain in the left side of his chest during that time. He states he initially thought it was a strained muscle but it has continued to be constant and worsening with exertion and is associated with fatigue, exercise intolerance, and exertional shortness of breath that is much worse than normal for him. Patient has no previous cardiac history. Does have a history of hypertension as well as a previous smoking history but quit about 35 years ago. Patient has significant family history of coronary artery disease in his father and brother. Patient has borderline diabetes. No previous cardiac work-up. Denies any associated nausea, vomiting, diaphoresis. Does have some dizziness. No associated leg swelling or orthopnea. HPI    NursingNotes were reviewed. REVIEW OF SYSTEMS    (2-9 systems for level 4, 10 or more for level 5)     Review of Systems   Constitutional: Positive for fatigue. Negative for fever. HENT: Negative for congestion, rhinorrhea and voice change. Eyes: Negative for pain and redness. Respiratory: Positive for shortness of breath. Negative for cough. Cardiovascular: Positive for chest pain. Gastrointestinal: Negative for abdominal pain, diarrhea and vomiting. Endocrine: Negative. Genitourinary: Negative. Musculoskeletal: Negative for arthralgias and gait problem. Skin: Negative for rash and wound. Neurological: Positive for dizziness. Negative for weakness and headaches. Hematological: Negative. Psychiatric/Behavioral: Negative. All other systems reviewed and are negative. A complete review of systems was performed and is negative except as noted above in the HPI. PAST MEDICAL HISTORY   No past medical history on file. SURGICAL HISTORY     No past surgical history on file. CURRENT MEDICATIONS       Previous Medications    No medications on file       ALLERGIES     Patient has no known allergies. FAMILY HISTORY     No family history on file.        SOCIAL HISTORY       Social History     Socioeconomic History    Marital status:      Spouse name: Not on file    Number of children: Not on file    Years of education: Not on file    Highest education level: Not on file   Occupational History    Not on file   Social Needs    Financial resource strain: Not on file    Food insecurity     Worry: Not on file     Inability: Not on file    Transportation needs     Medical: Not on file     Non-medical: Not on file   Tobacco Use    Smoking status: Never Smoker    Smokeless tobacco: Never Used   Substance and Sexual Activity    Alcohol use: Never     Frequency: Never    Drug use: Never    Sexual activity: Not on file   Lifestyle    Physical activity     Days per week: Not on file     Minutes per session: Not on file    Stress: Not on file   Relationships    Social connections     Talks on phone: Not on file     Gets together: Not on file     Attends [employer]ism service: Not on file     Active member of club or organization: Not on file Attends meetings of clubs or organizations: Not on file     Relationship status: Not on file    Intimate partner violence     Fear of current or ex partner: Not on file     Emotionally abused: Not on file     Physically abused: Not on file     Forced sexual activity: Not on file   Other Topics Concern    Not on file   Social History Narrative    Not on file       SCREENINGS    [place] Coma Scale  Eye Opening: Spontaneous  Best Verbal Response: Oriented  Best Motor Response: Obeys commands  [place] Coma Scale Score: 15        PHYSICAL EXAM    (up to 7 for level 4, 8 or more for level 5)     ED Triage Vitals   BP Temp Temp src Pulse Resp SpO2 Height Weight   11/13/20 0742 11/13/20 0740 -- 11/13/20 0740 11/13/20 0740 11/13/20 0740 11/13/20 0737 11/13/20 0737   131/84 98.8 °F (37.1 °C)  90 18 98 % 6' 1\" (1.854 m) 194 lb (88 kg)       Physical Exam  Vitals signs and nursing note reviewed. Constitutional:       General: He is not in acute distress. Appearance: He is well-developed. He is not diaphoretic. HENT:      Head: Normocephalic and atraumatic. Eyes:      General: No scleral icterus. Neck:      Vascular: No JVD. Cardiovascular:      Rate and Rhythm: Normal rate and regular rhythm. Pulses:           Radial pulses are 2+ on the right side and 2+ on the left side. Dorsalis pedis pulses are 2+ on the right side and 2+ on the left side. Heart sounds: Normal heart sounds. No murmur. No friction rub. No gallop. Pulmonary:      Effort: Pulmonary effort is normal. No accessory muscle usage or respiratory distress. Breath sounds: Normal breath sounds. No stridor. No decreased breath sounds, wheezing, rhonchi or rales. Chest:      Chest wall: No tenderness. Abdominal:      General: There is no distension. Palpations: Abdomen is soft. Tenderness: There is no abdominal tenderness. There is no guarding or rebound. Musculoskeletal: Normal range of motion.          General: No Kyle DA[facility] tel. ,  Chemistry results called to and read back by Fidel Barthel in ED, 11/13/2020 08:20,  by [address]   APTT   APTT   APTT       All other labs were within normal range or not returned as of this dictation. Medications   nitroGLYCERIN (NITROSTAT) SL tablet 0.4 mg (0.4 mg Sublingual Given 11/13/20 0913)   heparin (porcine) injection 4,000 Units (has no administration in time range)   heparin (porcine) injection 2,000 Units (has no administration in time range)   heparin 25,000 units in dextrose 5% 250 mL infusion (11.36 Units/kg/hr × 88 kg Intravenous New Bag 11/13/20 0844)   aspirin chewable tablet 324 mg (324 mg Oral Given 11/13/20 0841)   heparin (porcine) injection 4,000 Units (4,000 Units Intravenous Given 11/13/20 0843)       EMERGENCY DEPARTMENT COURSE and DIFFERENTIALDIAGNOSIS/MDM:   Vitals:    Vitals:    11/13/20 0833 11/13/20 0851 11/13/20 0902 11/13/20 0939   BP: 107/63  136/79 130/76   Pulse: 88 75 85 76   Resp: 23 22 22 17   Temp:       SpO2: 98% 98% 99% 96%   Weight:       Height:           [facility]    ED Course as of Nov 13 0940 Fri Nov 13, 2020   0740 EKG shows normal sinus rhythm. No findings of acute ischemia, infarction, right heart strain, or other acute concerning EKG findings    [JAMAL]   4218 Cardiology paged after troponin resulted. Discussed with hospitalist for admission. [JAMAL]      ED Course User Index  [JAMAL] Mauricio Nolen MD       CONSULTS:  IP CONSULT TO CARDIOLOGY    PROCEDURES:  Unless otherwise notedbelow, none     Procedures  CRITICAL CARE TIME   Total Critical Care time was approximately 40 minutes including seeing and evaluating patient, reviewing previous records, discussing with consultants, excluding separately reportable procedures. There was a high probability of clinically significant/life threatening deterioration in the patient's condition which required my urgent intervention.         FINAL IMPRESSION     1. NSTEMI (non-ST elevated myocardial infarction) ([facility] [address]    2. Exertional shortness of breath          DISPOSITION/PLAN   DISPOSITION        PATIENT REFERRED TO:  No follow-up provider specified.     DISCHARGE MEDICATIONS:  New Prescriptions    No medications on file          (Please note that portions of this note were completed with a voice recognition program.  Efforts were made to edit the dictations butoccasionally words are mis-transcribed.)    Nils Alcala MD (electronically signed)  AttendingEmergency Physician          Nils Galo MD  11/13/20 8188

## 2020-11-13 NOTE — ED NOTES
Prior to nitro pt notes pain at 4/10  Pain now 0/10 after nitro     Rochelle Malcolm RN  11/13/20 8337

## 2020-11-13 NOTE — CONSULTS
[facility]  Cardiology Consult      Requesting MD:  Charlaine Lanes., MD   Admit Status:  Inpatient [101]       History obtained from:   [] Patient  [] Other (specify):     Patient:  Rashid Avila  [id number]     Chief Complaint:   Chief Complaint   Patient presents with    Chest Pain    Shortness of Breath       HPI: Mr. Rita Louise is a 76 y.o. male with a history of hypertension prior tobacco abuse up till age 36 complains of worsening dyspnea with exertion over the past 12 months or longer does not describe typical exertional angina. He and a neighbor went to tear down at [facility] and he was using shovels and hose extensively over the past 3 days developed a constant aching in his left chest worse with movement of his arm worse with direct pressure finally decided to seek medical attention this morning troponin 0.38. No previous cardiac evaluation or testing. Review of Systems:  Review of Systems   Constitutional: Negative. Negative for chills, fever and unexpected weight change. HENT: Negative. Eyes: Negative. Respiratory: Negative. Negative for shortness of breath. Cardiovascular: Negative. Negative for chest pain. Gastrointestinal: Negative. Negative for diarrhea, nausea and vomiting. Endocrine: Negative. Genitourinary: Negative. Musculoskeletal: Negative. Skin: Negative. Neurological: Negative. All other systems reviewed and are negative. Cardiac Specific Data:  Specialty Problems        Cardiology Problems    NSTEMI (non-ST elevated myocardial infarction) [facility])              Past Medical History:  History reviewed. No pertinent past medical history. Past Surgical History:  History reviewed. No pertinent surgical history. Past Family History:  History reviewed. No pertinent family history.     Past Social History:  Social History     Socioeconomic History    Marital status:      Spouse name: Not on file    Number of children: 96%     No intake or output data in the 24 hours ending 11/13/20 1007  Estimated body mass index is 25.6 kg/m² as calculated from the following:    Height as of this encounter: 6' 1\" (1.854 m). Weight as of this encounter: 194 lb (88 kg). Physical Exam  Vitals signs reviewed. Constitutional:       General: He is not in acute distress. Appearance: Normal appearance. He is well-developed and normal weight. He is not ill-appearing, toxic-appearing or diaphoretic. HENT:      Head: Normocephalic and atraumatic. Nose: Nose normal.      Mouth/Throat:      Mouth: Mucous membranes are moist.      Pharynx: Oropharynx is clear. Eyes:      General: No scleral icterus. Extraocular Movements: Extraocular movements intact. Pupils: Pupils are equal, round, and reactive to light. Neck:      Musculoskeletal: Normal range of motion and neck supple. No neck rigidity or muscular tenderness. Vascular: No carotid bruit or JVD. Cardiovascular:      Rate and Rhythm: Normal rate and regular rhythm. Heart sounds: Normal heart sounds. No murmur. No friction rub. No gallop. Pulmonary:      Effort: Pulmonary effort is normal. No respiratory distress. Breath sounds: Normal breath sounds. No stridor. No wheezing, rhonchi or rales. Chest:      Chest wall: No tenderness. Abdominal:      General: Abdomen is flat. Bowel sounds are normal. There is no distension. Palpations: Abdomen is soft. There is no mass. Tenderness: There is no abdominal tenderness. There is no right CVA tenderness, left CVA tenderness, guarding or rebound. Hernia: No hernia is present. Musculoskeletal:         General: No swelling, tenderness, deformity or signs of injury. Right lower leg: No edema. Left lower leg: No edema. Lymphadenopathy:      Cervical: No cervical adenopathy. Skin:     General: Skin is warm and dry. Neurological:      General: No focal deficit present.       Mental Status: He is alert and oriented to person, place, and time. Mental status is at baseline. Cranial Nerves: No cranial nerve deficit. Sensory: No sensory deficit. Motor: No weakness. Coordination: Coordination normal.   Psychiatric:         Mood and Affect: Mood normal.         Behavior: Behavior normal.         Thought Content: Thought content normal.         Judgment: Judgment normal.         Labs:  Recent Labs     11/13/20  0745   WBC 11.1*   HGB 15.0          Recent Labs     11/13/20  0745   *   K 4.0      CO2 23   BUN 19   CREATININE 0.8   LABGLOM >60   CALCIUM 9.1       CK, CKMB, Troponin: @LABRCNT (CKTOTAL:3, CKMB:3, TROPONINI:3)@    Last 3 BNP:  No results for input(s): BNP in the last 72 hours. IMAGING:  Xr Chest Portable    Result Date: 11/13/2020  EXAMINATION: XR CHEST PORTABLE 11/13/2020 8:07 AM HISTORY: Chest pain, shortness of breath COMPARISON: 9/30/2010 FINDINGS: The heart appears normal in size. Aorta is mildly tortuous. Left perihilar opacities are noted. Right peripheral and infrahilar opacities are present which are new compared to the prior exam. There is some blunting of the costophrenic angles, suggesting trace pleural fluid. There is no appreciable pneumothorax. The pulmonary vasculature is indistinct. Bilateral groundglass airspace opacities concerning for an infectious or inflammatory process. Signed by Dr Christiano Mckeon on 11/13/2020 8:13 AM      Assessment:  1. 3-day history of left-sided chest pain constant worse with arm movement or direct pressure? Musculoskeletal  2. Complaints of worsening exertional dyspnea over the past 12 months or greater  3. Elevated troponin  0.38 possible non-STEMI  4. Prior tobacco abuse discontinued at age 39  9. Hypertension  6. Osteoarthritis  7. Status post knee replacement x2  8. Status post ostectomy  9. Previous right inguinal hernia repair  10.  Abnormal chest x-ray bilateral groundglass airspace opacities concerning for infectious or inflammatory process      Recommendations:  1. Serial EKGs and cardiac enzymes  2. Intravenous heparin or subcutaneous Lovenox  3. Aspirin daily  4. Suggest beta-blocker metoprolol 25 mg p.o. twice daily  5. Recommend echocardiogram  6.  Keep n.p.o. after midnight possible cardiac catheterization depending on various factors discussed with patient he is agreeable

## 2020-11-13 NOTE — PROGRESS NOTES
Lab called to report critical troponin level. Lab trending down from previous. Patient denies pain or discomfort.  and Dr. Boogie Gain aware of previous elevations. Will continue to monitor.   Electronically signed by Nav Hampton RN on 11/13/2020 at 5:00 PM

## 2020-11-13 NOTE — H&P
HISTORY AND PHYSICAL             Date: 11/13/2020        Patient Name: Malick Echeverria     YOB: 1945      Age:  76 y.o. Chief Complaint     Chief Complaint   Patient presents with    Chest Pain    Shortness of Breath       History Obtained From   patient    History of Present Illness     75-year-old male with a history of arthritis, BPH, hypertension, presented to the hospital with concerns of chest pain. Patient stated been having chest pain for the past 3 days, associated with dyspnea. Chest pain located in the left-sided chest area, with no radiation. Stated 4 out of 10 and would increase with exertion. Pain was more noticeable when he was clearing out [employer]. Denied any similar pain in the past.  Stated he had nausea but no emesis, denied diaphoresis. Due to persistent symptoms decided to present to the ED for evaluation. In the ED, vitals unremarkable, labs were obtained and noted to have elevated troponin at 0.38, EKG was obtained, D-dimers were normal.  Patient was initiated on a heparin drip, given aspirin and admitted for further evaluation. Cardiology was contacted from the emergency room. Past Medical History     Past Medical History:   Diagnosis Date    Arthritis     Chronic kidney disease     enlarged prostate    Hypertension         Past Surgical History     Past Surgical History:   Procedure Laterality Date    CHOLECYSTECTOMY      COLONOSCOPY      ENDOSCOPY, COLON, DIAGNOSTIC      HERNIA REPAIR      JOINT REPLACEMENT Bilateral     SKIN BIOPSY          Medications Prior to Admission     Prior to Admission medications    Medication Sig Start Date End Date Taking?  Authorizing Provider   captopril (CAPOTEN) 25 MG tablet Take 25 mg by mouth 2 times daily Patient takes both tabs in morning   Yes Historical Provider, MD   hydroCHLOROthiazide (MICROZIDE) 12.5 MG capsule Take 12.5 mg by mouth every morning   Yes Historical Provider, MD   finasteride (PROSCAR) 5 MG tablet Take 5 mg by mouth daily   Yes Historical Provider, MD        Allergies   Patient has no known allergies. Social History     Social History     Tobacco History     Smoking Status  Former Smoker Smoking Frequency  1.5 packs/day for 22 years (33 pk yrs) Smoking Tobacco Type  Cigarettes    Smokeless Tobacco Use  Former User Smokeless Tobacco Type  Chew          Alcohol History     Alcohol Use Status  Never          Drug Use     Drug Use Status  Never          Sexual Activity     Sexually Active  Not Currently Partners  Female                Family History     Family History   Problem Relation Age of Onset    Cancer Mother     Heart Attack Father     Heart Disease Father     High Blood Pressure Father     Arthritis Father     Cancer Brother     Substance Abuse Brother     Arthritis Brother     High Blood Pressure Brother     No Known Problems Maternal Aunt     Heart Disease Maternal Uncle     High Blood Pressure Maternal Uncle     Coronary Art Dis Maternal Uncle     No Known Problems Paternal Aunt     No Known Problems Paternal Uncle     Heart Attack Brother     Heart Disease Brother     Coronary Art Dis Brother     High Blood Pressure Brother        Review of Systems   Review of Systems: 16 point system reviewed and negative except as above. Physical Exam   /75   Pulse 66   Temp 97 °F (36.1 °C) (Temporal)   Resp 20   Ht 6' 1\" (1.854 m)   Wt 194 lb 6.4 oz (88.2 kg)   SpO2 92%   BMI 25.65 kg/m²       Physical Exam:  General: Alert, well-developed, no acute distress lying comfortably in bed. HEENT: Atraumatic normocephalic, range of motion normal, no JVD, no tracheal deviation noted. Cardiac: Normal S1-S2 no murmurs rub or gallop.   Respiratory: Effort normal, breath sounds normal, clear To auscultation bilaterally, no rhonchi or rales, no wheezing  Abdomen: Soft, positive bowel sounds in all quadrants, no distention, nontender to palpation, no organomegaly noted, no rebound noted, no CVA tenderness. MSK/extremities: no tenderness, no edema, no deformity, moves all extremities  Skin: Warm, no erythema noted, no bruising and no lesions noted. Psych: Affect normal and good eye contact, behavioral normal, thought content normal and judgment normal  Neurological: No focal deficits, alert and conversant, no formal disorientation noted. No sensory deficits, no abnormal coordination.         Labs      Recent Results (from the past 24 hour(s))   APTT    Collection Time: 11/13/20  7:43 AM   Result Value Ref Range    aPTT 32.3 26.0 - 36.2 sec   CBC Auto Differential    Collection Time: 11/13/20  7:45 AM   Result Value Ref Range    WBC 11.1 (H) 4.8 - 10.8 K/uL    RBC 5.11 4.70 - 6.10 M/uL    Hemoglobin 15.0 14.0 - 18.0 g/dL    Hematocrit 45.1 42.0 - 52.0 %    MCV 88.3 80.0 - 94.0 fL    MCH 29.4 27.0 - 31.0 pg    MCHC 33.3 33.0 - 37.0 g/dL    RDW 13.2 11.5 - 14.5 %    Platelets 656 434 - 784 K/uL    MPV 9.6 9.4 - 12.4 fL    Neutrophils % 73.9 (H) 50.0 - 65.0 %    Lymphocytes % 13.3 (L) 20.0 - 40.0 %    Monocytes % 9.6 0.0 - 10.0 %    Eosinophils % 2.4 0.0 - 5.0 %    Basophils % 0.3 0.0 - 1.0 %    Neutrophils Absolute 8.2 (H) 1.5 - 7.5 K/uL    Immature Granulocytes # 0.1 K/uL    Lymphocytes Absolute 1.5 1.1 - 4.5 K/uL    Monocytes Absolute 1.10 (H) 0.00 - 0.90 K/uL    Eosinophils Absolute 0.30 0.00 - 0.60 K/uL    Basophils Absolute 0.00 0.00 - 0.20 K/uL   Comprehensive Metabolic Panel w/ Reflex to MG    Collection Time: 11/13/20  7:45 AM   Result Value Ref Range    Sodium 135 (L) 136 - 145 mmol/L    Potassium reflex Magnesium 4.0 3.5 - 5.0 mmol/L    Chloride 103 98 - 111 mmol/L    CO2 23 22 - 29 mmol/L    Anion Gap 9 7 - 19 mmol/L    Glucose 117 (H) 74 - 109 mg/dL    BUN 19 8 - 23 mg/dL    CREATININE 0.8 0.5 - 1.2 mg/dL    GFR Non-African American >60 >60    GFR African American >59 >59    Calcium 9.1 8.8 - 10.2 mg/dL    Total Protein 7.0 6.6 - 8.7 g/dL    Alb 4.3 3.5 - 5.2 g/dL    Total Bilirubin 0.5 0.2 - 1.2 mg/dL    Alkaline Phosphatase 60 40 - 130 U/L    ALT 18 5 - 41 U/L    AST 31 5 - 40 U/L   Troponin    Collection Time: 11/13/20  7:45 AM   Result Value Ref Range    Troponin 0.38 (HH) 0.00 - 0.03 ng/mL   Brain Natriuretic Peptide    Collection Time: 11/13/20  7:45 AM   Result Value Ref Range    Pro- 0 - 1,800 pg/mL   D-Dimer, Quantitative    Collection Time: 11/13/20 10:30 AM   Result Value Ref Range    D-Dimer, Quant 0.42 0.00 - 0.48 ug/mL FEU        Imaging/Diagnostics Last 24 Hours   Xr Chest Portable    Result Date: 11/13/2020  EXAMINATION: XR CHEST PORTABLE 11/13/2020 8:07 AM HISTORY: Chest pain, shortness of breath COMPARISON: 9/30/2010 FINDINGS: The heart appears normal in size. Aorta is mildly tortuous. Left perihilar opacities are noted. Right peripheral and infrahilar opacities are present which are new compared to the prior exam. There is some blunting of the costophrenic angles, suggesting trace pleural fluid. There is no appreciable pneumothorax. The pulmonary vasculature is indistinct. Bilateral groundglass airspace opacities concerning for an infectious or inflammatory process. Signed by Dr Tian Liang on 11/13/2020 8:13 AM      Assessment      Hospital Problems           Last Modified POA    NSTEMI (non-ST elevated myocardial infarction) ([facility] [address] 11/13/2020 Yes          Plan         Unstable angina /NSTEMI  Given patient history of hyper pressure, prior tobacco use disorder, and family history of coronary artery disease, patient is at a high risks of having a cardiac event. Troponin elevated, continue to trend. Continue monitoring telemetry  Initiated aspirin daily, atorvastatin 40, ACE inhibitor, metoprolol 25 twice daily. Lipid panel and A1c ordered for a.m.  Echo ordered  Seen by cardiology, plans for cardiac cath tomorrow. Hypertension: Continue ACE inhibitor's. BPH: Continue Flomax.       Code: Full  DVT prophylaxis: Heparin drip  Diet: Cardiac, n.p.o. after midnight  Disposition: Pending completion of above work-up.         Consultations Ordered:  IP CONSULT TO CARDIOLOGY    Electronically signed by Bebe Tavera MD on 11/13/20 at 2:27 PM CST

## 2020-11-13 NOTE — PROGRESS NOTES
[address] arrived to room # 708. Presented with: NSTEMI  Mental Status: Patient is oriented, alert, thought processes intact and able to concentrate and follow conversation. Vitals:    11/13/20 1016   BP: 127/75   Pulse: 134   Resp: 20   Temp: 97 °F (36.1 °C)   SpO2: 92%     Patient safety contract and falls prevention contract reviewed with patient Yes. Oriented Patient to room. Call light within reach. Yes.   Needs, issues or concerns expressed at this time: no.      Electronically signed by Silviano Hodge RN on 11/13/2020 at 10:50 AM

## 2020-11-14 NOTE — PROGRESS NOTES
Cardiology Daily Note Talya Garcia MD      Patient:  Ekaterina Nicholas  [id number]    Patient Active Problem List    Diagnosis Date Noted    NSTEMI (non-ST elevated myocardial infarction) ([facility] [address] 11/13/2020     Priority: Low       Admit Date:  11/13/2020    Admission Problem List: Present on Admission:   NSTEMI (non-ST elevated myocardial infarction) [facility])      Cardiac Specific Data:  Specialty Problems        Cardiology Problems    NSTEMI (non-ST elevated myocardial infarction) [facility])              Subjective:  Mr. Molly Hayden seen today no significant chest pain cardiac catheterization scheduled for today. Blood pressure 106/67 heart 58. Troponins all elevated 0.35, .38.    Objective:   /67   Pulse 58   Temp 97.1 °F (36.2 °C) (Temporal)   Resp 18   Ht 6' 1\" (1.854 m)   Wt 194 lb 1 oz (88 kg)   SpO2 94%   BMI 25.60 kg/m²       Intake/Output Summary (Last 24 hours) at 11/14/2020 1049  Last data filed at 11/13/2020 2343  Gross per 24 hour   Intake 240 ml   Output 1200 ml   Net -960 ml       Prior to Admission medications    Medication Sig Start Date End Date Taking?  Authorizing Provider   captopril (CAPOTEN) 25 MG tablet Take 25 mg by mouth 2 times daily Patient takes both tabs in morning   Yes Historical Provider, MD   hydroCHLOROthiazide (MICROZIDE) 12.5 MG capsule Take 12.5 mg by mouth every morning   Yes Historical Provider, MD   finasteride (PROSCAR) 5 MG tablet Take 5 mg by mouth daily   Yes Historical Provider, MD        metoprolol tartrate  25 mg Oral BID    sodium chloride flush  10 mL Intravenous 2 times per day    finasteride  5 mg Oral Daily    captopril  25 mg Oral BID    sodium chloride flush  10 mL Intravenous 2 times per day    atorvastatin  40 mg Oral Nightly    aspirin  81 mg Oral Daily       TELEMETRY: Sinus     Physical Exam:      Physical Exam      General:  Awake, alert, NAD  Skin:  Warm and dry  Neck:  no jvd , no carotid bruits  Chest:  Clear to auscultation, no wheezing or rales  Cardiovascular:  RRR R6P0 no murmurs, clicks, gallups, or rubs  Abdomen:  Soft nontender, nondistended, bowel sounds present  Extremities:  Edema: none    Lab Data:  CBC:   Recent Labs     11/13/20 0745 11/14/20 0217   WBC 11.1* 11.0*   HGB 15.0 13.9*   HCT 45.1 42.0   MCV 88.3 89.4    188     BMP:   Recent Labs     11/13/20 0745 11/14/20 0217   * 135*   K 4.0 4.2    101   CO2 23 25   BUN 19 22   CREATININE 0.8 1.1     LIVER PROFILE:   Recent Labs     11/13/20 0745   AST 31   ALT 18   BILITOT 0.5   ALKPHOS 60     PT/INR: No results for input(s): PROTIME, INR in the last 72 hours. APTT:   Recent Labs     11/13/20 2015 11/14/20 0217 11/14/20  0804   APTT 63.8* 62.0* 70.4*     BNP:  No results for input(s): BNP in the last 72 hours. CK, CKMB, Troponin: @LABRCNT (CKTOTAL:3, CKMB:3, TROPONINI:3)@    IMAGING:  Xr Chest Portable    Result Date: 11/13/2020  EXAMINATION: XR CHEST PORTABLE 11/13/2020 8:07 AM HISTORY: Chest pain, shortness of breath COMPARISON: 9/30/2010 FINDINGS: The heart appears normal in size. Aorta is mildly tortuous. Left perihilar opacities are noted. Right peripheral and infrahilar opacities are present which are new compared to the prior exam. There is some blunting of the costophrenic angles, suggesting trace pleural fluid. There is no appreciable pneumothorax. The pulmonary vasculature is indistinct. Bilateral groundglass airspace opacities concerning for an infectious or inflammatory process. Signed by Dr John Lizarraga on 11/13/2020 8:13 AM        Assessment:  1. 3-day history of left-sided chest pain constant worse with arm movement or direct pressure? Musculoskeletal  2. Complaints of worsening exertional dyspnea over the past 12 months or greater  3. Elevated troponin  0.38 possible non-STEMI  4. Prior tobacco abuse discontinued at age 39  9. Hypertension  6. Osteoarthritis  7. Status post knee replacement x2  8. Status post ostectomy  9.  Previous right inguinal hernia repair  10. Abnormal chest x-ray bilateral groundglass airspace opacities concerning for infectious or inflammatory process       Plan:  1. Plan diagnostic catheterization today patient agreeable. Advised indications alternatives benefits and risk. I have discussed with the patient regarding indications for the proposed procedure LEFT HEART CATHETERIZATION AND POSSIBLE PERCUTANEOUS INTERVENTION  along with possible alternatives benefits and risks including but not limited to risks of death, myocardial infarction, stroke, contrast induced nephropathy which in some cases may lead to acute kidney failure requiring dialysis, allergic reactions, bleeding requiring blood transfusion,  cardiac arrhthymias, respiratory failure which may require placing the patient on respiratory support such as a ventilator or breathing machine,risk of complications which may require vascular surgery, and if coronary intervention is performed emergency CABG may be required in less than 1% of cases. The patient is awake and alert and understands the issues involved and indicates willingness to proceed as ordered. The patient does not have any contraindications to dual antiplatelet therapy. The patient does not have any known  pending surgical procedures in the next 12 months at this time. The patient is  a reasonable candidate for moderate conscious sedation.     ASA score:  ASA 3 - Patient with moderate systemic disease with functional limitations    Mallampati: I (soft palate, uvula, fauces, tonsillar pillars visible)    Preferred vascular access site will be: right radial artery        Albina Radford MD 11/14/2020 10:49 AM

## 2020-11-14 NOTE — PROGRESS NOTES
Cardiac cath preliminary note right radial artery approach tolerated well. Ostial RCA 40 to 50% noncritical disease RCA moderate diffuse disease into branches? Ramus medical therapy recommended.

## 2020-11-14 NOTE — DISCHARGE SUMMARY
focal deficits, alert and conversant, no formal disorientation noted. No sensory deficits, no abnormal coordination. Consultants:   IP CONSULT TO CARDIOLOGY    Time Spent on Discharge:  < 30 minutes were spent in patient examination, evaluation, counseling as well as medication reconciliation, prescriptions for required medications, discharge plan and follow up. Surgeries/Procedures Performed:  NONE      Significant Diagnostic Studies:   Recent Labs:  CBC:   Lab Results   Component Value Date    WBC 11.0 11/14/2020    RBC 4.70 11/14/2020    HGB 13.9 11/14/2020    HCT 42.0 11/14/2020    MCV 89.4 11/14/2020    MCH 29.6 11/14/2020    MCHC 33.1 11/14/2020    RDW 13.2 11/14/2020     11/14/2020     BMP:    Lab Results   Component Value Date    GLUCOSE 108 11/14/2020     11/14/2020    K 4.2 11/14/2020     11/14/2020    CO2 25 11/14/2020    ANIONGAP 9 11/14/2020    BUN 22 11/14/2020    CREATININE 1.1 11/14/2020    CALCIUM 9.1 11/14/2020    LABGLOM >60 11/14/2020    GFRAA >59 11/14/2020       Radiology Last 7 Days:  Xr Chest Portable    Result Date: 11/13/2020  Bilateral groundglass airspace opacities concerning for an infectious or inflammatory process.  Signed by Dr Mariaelena Chi on 11/13/2020 8:13 AM      Discharge Plan   Disposition: Home    Provider Follow-Up:   Sean Ochoa MD  [address] [facility] [address] [id number]  [phone]    On 12/14/2020  1:00 PM        Schedule an appointment as soon as possible for a visit in 1 week  With your PCP for Hospital follow-up     Sean Ochoa MD  [address]  [phone]    In 1 month           Patient Instructions   Diet: cardiac diet    Activity: activity as tolerated      Discharge Medications         Medication List      START taking these medications    aspirin 81 MG chewable tablet  Take 1 tablet by mouth daily     atorvastatin 40 MG tablet  Commonly known as:  LIPITOR  Take 1 tablet by mouth nightly     clopidogrel 75 MG tablet  Commonly known as:  PLAVIX  Take 1 tablet by mouth daily     isosorbide mononitrate 30 MG extended release tablet  Commonly known as:  IMDUR  Take 1 tablet by mouth daily     metoprolol tartrate 25 MG tablet  Commonly known as:  LOPRESSOR  Take 0.5 tablets by mouth 2 times daily        CONTINUE taking these medications    finasteride 5 MG tablet  Commonly known as:  PROSCAR        STOP taking these medications    captopril 25 MG tablet  Commonly known as:  CAPOTEN     hydroCHLOROthiazide 12.5 MG capsule  Commonly known as:  [place]           Where to Get Your Medications      These medications were sent to [facility], [address] [facility] [address] [facility] [address], [address] [facility] [address]    Phone:  [phone]   · aspirin 81 MG chewable tablet  · atorvastatin 40 MG tablet  · clopidogrel 75 MG tablet  · isosorbide mononitrate 30 MG extended release tablet  · metoprolol tartrate 25 MG tablet         Electronically signed by Sunita Telles MD on 11/14/20 at 12:47 PM CST

## 2020-11-14 NOTE — PROGRESS NOTES
Lab reported a PTT of 63.8. No changes to heparin infusion per order parameters.  Electronically signed by Phyllis Hogue RN on 11/14/2020 at 3:34 AM

## 2020-11-14 NOTE — PROGRESS NOTES
Lab has reported troponins of 0.34 and 0.35 this shift. Pt has had no complaints of pain or discomfort.  is aware of the original elevation and it is slightly lower now.  Electronically signed by Tessa Smith RN on 11/14/2020 at 3:38 AM

## 2020-11-15 NOTE — DISCHARGE SUMMARY
Discharge Summary      Date:11/15/2020        Patient Name:Hermilo Manriquez     YOB: 1945     Age:75 y.o. Admit Date:11/13/2020   Admission Condition:fair   Discharged Condition:stable  Discharge Date: 11/15/20       Discharge Diagnoses   Active Problems:    NSTEMI (non-ST elevated myocardial infarction) [facility])  Resolved Problems:    * No resolved hospital problems. [facility] Stay   Narrative of Hospital Course:     77-year-old male with a history of arthritis, BPH, hypertension, presented to the hospital with concerns of chest pain. Patient stated been having chest pain for the past 3 days prior to admission, associated with dyspnea. Denied any similar pain in the past.  Stated he had nausea but no emesis, denied diaphoresis. In the ED, vitals unremarkable, labs were obtained and noted to have elevated troponin at 0.38, EKG was obtained, D-dimers were normal.  Patient was initiated on a heparin drip, given aspirin and admitted for further evaluation. Cardiology evaluated and s/p cardiac cath showing Ostial RCA 40 to 50% noncritical disease RCA moderate diffuse disease into branches? Ramus medical therapy recommended. To follow up outpt with PCP as well ac cardiology for continuous management of chronic medical problems. Physical Exam:  General: Alert, well-developed, no acute distress lying comfortably in bed. HEENT: Atraumatic normocephalic, range of motion normal  Cardiac: Normal S1-S2 no murmurs rub or gallop. Respiratory: Effort normal, breath sounds normal, clear To auscultation bilaterally, no rhonchi or rales, no wheezing  Abdomen: Soft, positive bowel sounds in all quadrants, no distention, nontender to palpation  MSK/extremities: no tenderness, no edema, no deformity, moves all extremities  Skin: Warm, no erythema noted, no bruising and no lesions noted.   Psych: Affect normal and good eye contact, behavioral normal, thought content normal and judgment normal  Neurological: No focal deficits, alert and conversant, no formal disorientation noted. No sensory deficits, no abnormal coordination. Consultants:   IP CONSULT TO CARDIOLOGY    Time Spent on Discharge:  < 30 minutes were spent in patient examination, evaluation, counseling as well as medication reconciliation, prescriptions for required medications, discharge plan and follow up. Surgeries/Procedures Performed:  NONE      Significant Diagnostic Studies:   Recent Labs:  CBC:   Lab Results   Component Value Date    WBC 11.0 11/14/2020    RBC 4.70 11/14/2020    HGB 13.9 11/14/2020    HCT 42.0 11/14/2020    MCV 89.4 11/14/2020    MCH 29.6 11/14/2020    MCHC 33.1 11/14/2020    RDW 13.2 11/14/2020     11/14/2020     BMP:    Lab Results   Component Value Date    GLUCOSE 108 11/14/2020     11/14/2020    K 4.2 11/14/2020     11/14/2020    CO2 25 11/14/2020    ANIONGAP 9 11/14/2020    BUN 22 11/14/2020    CREATININE 1.1 11/14/2020    CALCIUM 9.1 11/14/2020    LABGLOM >60 11/14/2020    GFRAA >59 11/14/2020       Radiology Last 7 Days:  Xr Chest Portable    Result Date: 11/13/2020  Bilateral groundglass airspace opacities concerning for an infectious or inflammatory process.  Signed by Dr Flakita Blue on 11/13/2020 8:13 AM      Discharge Plan   Disposition: Home    Provider Follow-Up:   Nevin Moise MD  [address] [facility] [address] [address]  [phone]    On 12/14/2020  1:00 PM        Schedule an appointment as soon as possible for a visit in 1 week  With your PCP for Hospital follow-up     Nevin Moise MD  [address]  [phone]    In 1 month           Patient Instructions   Diet: cardiac diet    Activity: activity as tolerated      Discharge Medications         Medication List      START taking these medications    aspirin 81 MG chewable tablet  Take 1 tablet by mouth daily     atorvastatin 40 MG tablet  Commonly known as:  LIPITOR  Take 1 tablet by mouth nightly     clopidogrel 75 MG tablet  Commonly known as:  PLAVIX  Take 1 tablet by mouth daily     isosorbide mononitrate 30 MG extended release tablet  Commonly known as:  IMDUR  Take 1 tablet by mouth daily     metoprolol tartrate 25 MG tablet  Commonly known as:  LOPRESSOR  Take 0.5 tablets by mouth 2 times daily        CONTINUE taking these medications    finasteride 5 MG tablet  Commonly known as:  PROSCAR        STOP taking these medications    captopril 25 MG tablet  Commonly known as:  CAPOTEN     hydroCHLOROthiazide 12.5 MG capsule  Commonly known as:  [place]           Where to Get Your Medications      These medications were sent to [facility] [place], [address] [facility] [address] [facility] [address], [address] [facility] [address] [place] [address]    Phone:  [phone]   · aspirin 81 MG chewable tablet  · atorvastatin 40 MG tablet  · clopidogrel 75 MG tablet  · isosorbide mononitrate 30 MG extended release tablet  · metoprolol tartrate 25 MG tablet         Electronically signed by Yolette Nichole MD on 11/14/20 at 12:47 PM CST

## 2020-11-15 NOTE — PROGRESS NOTES
Cardiology Daily Note Jessica Belcher MD      Patient:  Fanta Duran  [id number]    Patient Active Problem List    Diagnosis Date Noted    NSTEMI (non-ST elevated myocardial infarction) ([address] 11/13/2020     Priority: Low       Admit Date:  11/13/2020    Admission Problem List: Present on Admission:   NSTEMI (non-ST elevated myocardial infarction) [facility])      Cardiac Specific Data:  Specialty Problems        Cardiology Problems    NSTEMI (non-ST elevated myocardial infarction) [facility])              Subjective:  Mr. Sherry Gunn seen today underwent diagnostic cardiac catheterization yesterday demonstrating 60% mid LAD 90% diffuse disease in a small diagonal diffuse disease throughout the circumflex medical therapy recommended. No further chest pain. Vital signs stable. Objective:   BP (!) 89/51   Pulse 59   Temp 97.2 °F (36.2 °C) (Temporal)   Resp 12   Ht 6' 1\" (1.854 m)   Wt 194 lb 1 oz (88 kg)   SpO2 93%   BMI 25.60 kg/m²       Intake/Output Summary (Last 24 hours) at 11/15/2020 1110  Last data filed at 11/14/2020 1837  Gross per 24 hour   Intake 240 ml   Output 650 ml   Net -410 ml       Prior to Admission medications    Medication Sig Start Date End Date Taking?  Authorizing Provider   aspirin 81 MG chewable tablet Take 1 tablet by mouth daily 11/15/20  Yes Juan Mcelroy MD   isosorbide mononitrate (IMDUR) 30 MG extended release tablet Take 1 tablet by mouth daily 11/15/20  Yes Juan Mcelroy MD   atorvastatin (LIPITOR) 40 MG tablet Take 1 tablet by mouth nightly 11/14/20  Yes Juan Mcelroy MD   metoprolol tartrate (LOPRESSOR) 25 MG tablet Take 1 tablet by mouth 2 times daily 11/14/20  Yes Juan Mcelroy MD   clopidogrel (PLAVIX) 75 MG tablet Take 1 tablet by mouth daily 11/15/20  Yes Juan Mcelroy MD   finasteride (PROSCAR) 5 MG tablet Take 5 mg by mouth daily   Yes Historical Provider, MD        metoprolol tartrate  12.5 mg Oral BID    sodium chloride flush  10 mL Intravenous 2 times per day  isosorbide mononitrate  30 mg Oral Daily    clopidogrel  75 mg Oral Daily    finasteride  5 mg Oral Daily    atorvastatin  40 mg Oral Nightly    aspirin  81 mg Oral Daily       TELEMETRY: Sinus     Physical Exam:      Physical Exam      General:  Awake, alert, NAD  Skin:  Warm and dry  Neck:  no jvd , no carotid bruits  Chest:  Clear to auscultation, no wheezing or rales  Cardiovascular:  RRR D9U2 no murmurs, clicks, gallups, or rubs  Abdomen:  Soft nontender, nondistended, bowel sounds present  Extremities:  Edema: none       Lab Data:  CBC:   Recent Labs     11/13/20 0745 11/14/20 0217   WBC 11.1* 11.0*   HGB 15.0 13.9*   HCT 45.1 42.0   MCV 88.3 89.4    188     BMP:   Recent Labs     11/13/20 0745 11/14/20 0217   * 135*   K 4.0 4.2    101   CO2 23 25   BUN 19 22   CREATININE 0.8 1.1     LIVER PROFILE:   Recent Labs     11/13/20 0745   AST 31   ALT 18   BILITOT 0.5   ALKPHOS 60     PT/INR: No results for input(s): PROTIME, INR in the last 72 hours. APTT:   Recent Labs     11/13/20 2015 11/14/20 0217 11/14/20  0804   APTT 63.8* 62.0* 70.4*     BNP:  No results for input(s): BNP in the last 72 hours. CK, CKMB, Troponin: @LABRCNT (CKTOTAL:3, CKMB:3, TROPONINI:3)@    IMAGING:  Xr Chest Portable    Result Date: 11/13/2020  EXAMINATION: XR CHEST PORTABLE 11/13/2020 8:07 AM HISTORY: Chest pain, shortness of breath COMPARISON: 9/30/2010 FINDINGS: The heart appears normal in size. Aorta is mildly tortuous. Left perihilar opacities are noted. Right peripheral and infrahilar opacities are present which are new compared to the prior exam. There is some blunting of the costophrenic angles, suggesting trace pleural fluid. There is no appreciable pneumothorax. The pulmonary vasculature is indistinct. Bilateral groundglass airspace opacities concerning for an infectious or inflammatory process.  Signed by Dr Vaughn Hererra on 11/13/2020 8:13 AM        Assessment:  1. 3-day history of left-sided chest pain constant worse with arm movement or direct pressure? Musculoskeletal  2. Complaints of worsening exertional dyspnea over the past 12 months or greater  3. Elevated troponin  0.38 possible non-STEMI  4. Prior tobacco abuse discontinued at age 39  9. Hypertension  6. Osteoarthritis  7. Status post knee replacement x2  8. Status post ostectomy  9. Previous right inguinal hernia repair  10. Abnormal chest x-ray bilateral groundglass airspace opacities concerning for infectious or inflammatory process  11. Cardiac catheterization 11/14/2020 2060% mid LAD diffuse moderately severe disease in a small diagonal diffuse disease throughout the circumflex medical therapy recommended       Plan:  1.  Continue current medical therapy ambulate as tolerated if stable can be discharged today    Jen Lisa MD 11/15/2020 11:10 AM

## 2020-11-16 NOTE — CONSULTS
Cardiac Rehab MI/PTCA/Stent education packet was sent to the patient's address on record. Handouts included were titled; \"Home Instructions Following a Cardiac Event\", \"Cardiac Home Exercise Program - Phase I\", \"Risk Factors for Heart Disease and Stroke\" and \"Cardiac Diet/Low Cholesterol\". Patient was instructed to contact [address] [place] [address] or the hospital nearest their residence for the opportunity to enroll in Phase II Outpatient Cardiac Rehab.

## 2020-11-17 PROBLEM — E78.5 HYPERLIPIDEMIA: Status: ACTIVE | Noted: 2020-01-01

## 2020-11-17 PROBLEM — I25.10 CORONARY ARTERY DISEASE: Status: ACTIVE | Noted: 2020-01-01

## 2020-11-17 PROBLEM — Z79.01 ANTICOAGULATED: Status: ACTIVE | Noted: 2020-01-01

## 2020-11-18 NOTE — PROGRESS NOTES
Subjective   Elver Gordon is a 75 y.o. male presenting with chief complaint of:   Chief Complaint   Patient presents with   • Follow-up     heart attack       History of Present Illness :  Alone.  Here for primarily an acute issue today;  F/u MI.  Working hard last weekend; SOB/chest pain.  Went to ; had MI.  Dustin took to cath lab (all before our test could be ordered)    Normal LV systolic function.   60% mid LAD   90% diffuse disease small diagonal   Diffuse disease circumflex, ramus, and OM    Kept two days; Rx helping as feels better.     Has multiple chronic problems to consider that might have a bearing on today's issues; not an interval appointment.       Chronic/acute problems reviewed today:   1. Essential hypertension Chronic/stable. Stable here past/and home blood pressures.  No significant chest pain, SOB, LE edema, orthopnea, near syncope, dizziness/light headness.   Recent Vitals       7/16/2020 11/5/2020 11/17/2020       BP:  144/92  118/72  124/74      Pt did not take his medication today       Pulse:  86  75  76     Temp:  97.6 °F (36.4 °C)  97.1 °F (36.2 °C)  97.5 °F (36.4 °C)     Weight:  88 kg (194 lb)  88.9 kg (196 lb)  89.9 kg (198 lb 3.2 oz)     BMI (Calculated):  25.6  25.9  26.2            2. Anticoagulated: CAD/ASA 81, plavix Chronic/stable reason and use of.  Denies bleeding issues; especially epistaxis, melena, hematochezia.  Upper arms/others do bruise easily.  No significant bleeding or falls.      3. Coronary artery disease involving native coronary artery of native heart without angina pectoris chronic/stable as no chest pain, SOB, use of NTG     4. SOB: cough, CAD Chronic/better now:  SOB worse with exertion/ok at rest.  Contributing diagnosis: recent MI, CAD ? others.       Has an/another acute issue today: none.    The following portions of the patient's history were reviewed and updated as appropriate: allergies, current medications, past family history, past medical  history, past social history, past surgical history and problem list.      Current Outpatient Medications:   •  aspirin 81 MG chewable tablet, Chew 81 mg Daily., Disp: , Rfl:   •  atorvastatin (LIPITOR) 40 MG tablet, Take 40 mg by mouth Every Night., Disp: , Rfl:   •  clopidogrel (PLAVIX) 75 MG tablet, Take 75 mg by mouth Daily., Disp: , Rfl:   •  finasteride (PROSCAR) 5 MG tablet, TAKE 1 TABLET BY MOUTH  DAILY, Disp: 90 tablet, Rfl: 3  •  isosorbide dinitrate (ISORDIL) 30 MG tablet, Take 30 mg by mouth 4 (Four) Times a Day., Disp: , Rfl:   •  isosorbide mononitrate (IMDUR) 30 MG 24 hr tablet, Take 30 mg by mouth., Disp: , Rfl:   •  metoprolol tartrate (LOPRESSOR) 25 MG tablet, Take 25 mg by mouth 2 (Two) Times a Day., Disp: , Rfl:   •  acetaminophen (TYLENOL) 325 MG tablet, Take 650 mg by mouth Every 6 (Six) Hours As Needed for Mild Pain ., Disp: , Rfl:   •  calcium carbonate (TUMS) 500 MG chewable tablet, Chew 1 tablet Daily., Disp: , Rfl:     No problems with medications.  Refills if needed done    No Known Allergies    Review of Systems  GENERAL:  Active/slower with limits, speed, stamina for age and vertigo. Sleep is ok. No fever now.  ENDO:  No syncope, near or diaphoretic sweaty spells.  HEENT: No head injury or headache.   No vision change.   Same mild tinnitis/hearing loss.  Ears without pain/drainage.  No sore throat.  No significant nasal/sinus congestion/drainage. No epistaxis.  CHEST: No chest wall tenderness or mass. No significant cough, without wheeze, hemoptysis; above SOB  CV: No chest pain, palpitations, ankle edema.  GI: No heartburn, dysphagia.  No abdominal pain, diarrhea, constipation, rectal bleeding, or melena.    :  Voids without dysuria, or incontinence to completion.  ORTHO: No painful/swollen joints but various on /off sore.  No sore neck or back.  No acute neck or back pain without recent injury.   NEURO: No dizziness, weakness of extremities.  No numbness/paresthesias.   PSYCH: No  memory loss.  Mood good; not anxious, depressed but/and not suicidal.  Tolerated stress.      Screening:  Mammogram:NA  Bone density: NA  Low dose CT chest: Tobacco-smoker/age 18/1.5 ppd/dc 85 (22 py): NA  GI: Colon-polyp/Mc/4.2.2018/5y  Prostate: Zuniga/confirmed/7.17.19  Usual lab order  any labs urology/other MD wants  6m BMP, A1c  12m CBC, CMP, A1c,  LIPID, TSH, Vit D, PSAs      Lab Results:  Results for orders placed or performed in visit on 07/16/20   Comprehensive metabolic panel    Specimen: Blood   Result Value Ref Range    Glucose 103 (H) 65 - 99 mg/dL    BUN 21 8 - 23 mg/dL    Creatinine 0.93 0.76 - 1.27 mg/dL    eGFR Non African Am 79 >60 mL/min/1.73    eGFR African Am 96 >60 mL/min/1.73    BUN/Creatinine Ratio 22.6 7.0 - 25.0    Sodium 138 136 - 145 mmol/L    Potassium 4.4 3.5 - 5.2 mmol/L    Chloride 102 98 - 107 mmol/L    Total CO2 24.7 22.0 - 29.0 mmol/L    Calcium 9.6 8.6 - 10.5 mg/dL    Total Protein 6.5 6.0 - 8.5 g/dL    Albumin 4.70 3.50 - 5.20 g/dL    Globulin 1.8 gm/dL    A/G Ratio 2.6 g/dL    Total Bilirubin 0.6 0.0 - 1.2 mg/dL    Alkaline Phosphatase 56 39 - 117 U/L    AST (SGOT) 16 1 - 40 U/L    ALT (SGPT) 17 1 - 41 U/L   Lipid Panel With LDL/HDL Ratio    Specimen: Blood   Result Value Ref Range    Total Cholesterol 165 0 - 200 mg/dL    Triglycerides 73 0 - 150 mg/dL    HDL Cholesterol 39 (L) 40 - 60 mg/dL    VLDL Cholesterol 14.6 mg/dL    LDL Cholesterol  111 (H) 0 - 100 mg/dL    LDL/HDL Ratio 2.86    TSH    Specimen: Blood   Result Value Ref Range    TSH 2.180 0.270 - 4.200 uIU/mL   Hemoglobin A1c    Specimen: Blood   Result Value Ref Range    Hemoglobin A1C 5.90 (H) 4.80 - 5.60 %   PSA, Total & Free    Specimen: Blood   Result Value Ref Range    PSA 2.9 0.0 - 4.0 ng/mL    PSA, Free 0.77 N/A ng/mL    PSA, Free % 26.6 %   CBC and Differential    Specimen: Blood   Result Value Ref Range    WBC 7.71 3.40 - 10.80 10*3/mm3    RBC 5.16 4.14 - 5.80 10*6/mm3    Hemoglobin 15.2 13.0 - 17.7 g/dL     Hematocrit 45.3 37.5 - 51.0 %    MCV 87.8 79.0 - 97.0 fL    MCH 29.5 26.6 - 33.0 pg    MCHC 33.6 31.5 - 35.7 g/dL    RDW 13.2 12.3 - 15.4 %    Platelets 178 140 - 450 10*3/mm3    Neutrophil Rel % 63.0 42.7 - 76.0 %    Lymphocyte Rel % 25.6 19.6 - 45.3 %    Monocyte Rel % 8.7 5.0 - 12.0 %    Eosinophil Rel % 1.8 0.3 - 6.2 %    Basophil Rel % 0.6 0.0 - 1.5 %    Neutrophils Absolute 4.86 1.70 - 7.00 10*3/mm3    Lymphocytes Absolute 1.97 0.70 - 3.10 10*3/mm3    Monocytes Absolute 0.67 0.10 - 0.90 10*3/mm3    Eosinophils Absolute 0.14 0.00 - 0.40 10*3/mm3    Basophils Absolute 0.05 0.00 - 0.20 10*3/mm3    Immature Granulocyte Rel % 0.3 0.0 - 0.5 %    Immature Grans Absolute 0.02 0.00 - 0.05 10*3/mm3    nRBC 0.0 0.0 - 0.2 /100 WBC       A1C:  Lab Results - Last 18 Months   Lab Units 11/14/20 0217 07/16/20  0850 01/16/20  0710 07/12/19  0758   HEMOGLOBIN A1C % 5.8 5.90* 6.00* 6.00*     PSA:  Lab Results - Last 18 Months   Lab Units 07/16/20  0850 06/28/19  0941   PSA ng/mL 2.9 2.9     CBC:  Lab Results - Last 18 Months   Lab Units 11/14/20 0217 11/13/20  0745 07/16/20  0850 07/12/19  0758   WBC K/uL 11.0* 11.1* 7.71 8.57   HEMOGLOBIN g/dL 13.9* 15.0 15.2 14.8   HEMATOCRIT % 42.0 45.1 45.3 46.0   PLATELETS K/uL 188 177 178 204      BMP/CMP:  Lab Results - Last 18 Months   Lab Units 07/16/20  0850 01/16/20  0710 07/12/19  0758   SODIUM mmol/L 138 139 139   POTASSIUM mmol/L 4.4 4.2 4.2   CHLORIDE mmol/L 102 102 102   TOTAL CO2 mmol/L 24.7 27.0 25.9   GLUCOSE mg/dL 103* 98 121*   BUN mg/dL 21 18 20   CREATININE mg/dL 0.93 1.03 1.06   EGFR IF NONAFRICN AM mL/min/1.73 79 71 68   EGFR IF AFRICN AM mL/min/1.73 96 86 83   CALCIUM mg/dL 9.6 9.1 9.2     HEPATIC:  Lab Results - Last 18 Months   Lab Units 07/16/20  0850 07/12/19  0758   ALT (SGPT) U/L 17 13   AST (SGOT) U/L 16 15   ALK PHOS U/L 56 60     THYROID:  Lab Results - Last 18 Months   Lab Units 07/16/20  0850 07/12/19  0758   TSH uIU/mL 2.180 4.090       Objective   BP  "124/74 (BP Location: Left arm, Patient Position: Sitting, Cuff Size: Large Adult)   Pulse 76   Temp 97.5 °F (36.4 °C) (Infrared)   Resp 18   Ht 185.4 cm (73\")   Wt 89.9 kg (198 lb 3.2 oz)   SpO2 98%   BMI 26.15 kg/m²   Body mass index is 26.15 kg/m².    Recent Vitals       7/16/2020 11/5/2020 11/17/2020       BP:  144/92  118/72  124/74      Pt did not take his medication today       Pulse:  86  75  76     Temp:  97.6 °F (36.4 °C)  97.1 °F (36.2 °C)  97.5 °F (36.4 °C)     Weight:  88 kg (194 lb)  88.9 kg (196 lb)  89.9 kg (198 lb 3.2 oz)     BMI (Calculated):  25.6  25.9  26.2           Physical Exam  GENERAL:  Well nourished/developed in no acute distress.  SKIN: Turgor excellent, without wound, rash, lesion.  HEENT: Normal cephalic without trauma.  Pupils equal round reactive to light. Extraocular motions full without nystagmus.   External canals nonobstructive nontender without reddness. Tymphatic membranes annie with coby structures intact.   Oral cavity without growths, exudates, and moist.  Posterior pharynx without mass, obstruction, redness.  No thyromegaly, mass, tenderness, lymphadenopathy and supple.  CV: Regular rhythm.  No murmur, gallop,  edema. Posterior pulses intact.  No carotid bruits.  CHEST: No chest wall tenderness or mass.   LUNGS: Symmetric motion with clear to auscultation.  No dullness to percussion  ABD: Soft, nontender without mass.   ORTHO: Symmetric extremities without swelling/point tenderness.  Full gross range of motion..  NEURO: CN 2-12 grossly intact.  Symmetric facies. 1/4 x bicep knee ankle equal reflexes.  UE/LE   3-4/5 strength throughout.  Nonfocal use extremities. Speech clear.  Finger/nose and tandom walking ok.  Turning head; causes room to spin.     PSYCH: Oriented x 3.  Pleasant calm, well kept.  Purposeful/directed conservation with intact short/long gross memory.       Assessment/Plan     1. Essential hypertension    2. Anticoagulated: CAD/ASA 81, plavix    3. " Coronary artery disease involving native coronary artery of native heart without angina pectoris    4. SOB: cough, CAD        Discussions:   Same Rx    Rx: reviewed/changes:  No orders of the defined types were placed in this encounter.      LAB/Testing/Referrals: reviewed/orders:   Today: none  No orders of the defined types were placed in this encounter.    Chronic/recurrent labs above or change to:   same     Body mass index is 26.15 kg/m².  Patient's Body mass index is 26.15 kg/m². BMI is within normal parameters. No follow-up required..      Tobacco use reviewed:    Elver Gordon  reports that he quit smoking about 33 years ago. His smoking use included cigarettes. He started smoking about 58 years ago. He has a 22.50 pack-year smoking history. He has never used smokeless tobacco..     There are no Patient Instructions on file for this visit.    Follow up: Return for lab;, Dr Lorenzo-, as planned;.  Future Appointments   Date Time Provider Department Center   12/9/2020  8:00 AM P[facility] ECHO ROOM 1  [facility] CARDI PAD   12/9/2020  9:00 AM [facility] STRESS LAB 1  [facility] CARDI [facility]   12/9/2020 10:00 AM PAD CT 2  [facility] CAT PAD   12/30/2020  9:00 AM  [facility] PULM LAB ROOM 2  [facility] PFT PAD   1/15/2021  8:20 AM LAB [facility]   7/20/2021  9:00 AM Andre Lorenzo MD [facility]       Procedures none

## 2020-11-24 NOTE — TELEPHONE ENCOUNTER
Computer says why isordil 30 qid AND imdur; is he on imdur or isordil; would be unusual to be on both??

## 2020-11-25 NOTE — TELEPHONE ENCOUNTER
Requested Prescriptions     Pending Prescriptions Disp Refills   • isosorbide mononitrate (IMDUR) 30 MG 24 hr tablet 90 tablet 3     Sig: Take 1 tablet by mouth Daily.   • clopidogrel (PLAVIX) 75 MG tablet 90 tablet 3     Sig: Take 1 tablet by mouth Daily.   • atorvastatin (LIPITOR) 40 MG tablet 90 tablet 1     Sig: Take 1 tablet by mouth Every Night.

## 2020-11-25 NOTE — TELEPHONE ENCOUNTER
[facility] sent wrong refill, pt needs the imdur refilled. He does not take the isordil, I d/c it from the med list. Will send separate request for correct refills.

## 2020-12-14 NOTE — PROGRESS NOTES
[facility] Progress Note                            Date:  12/14/2020  Patient: Kristopher Wu  Age:  76 y.o., 1945      Reason for evaluation:         SUBJECTIVE:    Returns today for follow-up assessment underwent diagnostic cardiac catheterization 11/14/2020 2090% diffuse disease small diagonal 60% mid LAD and diffuse disease in remaining arteries medical therapy recommended. Complains of gradually worsening exertional dyspnea over the past year and a half had pulmonary function test July 2019 was interested in repeating these which we will go ahead and order. He states that if he overexerts he gets a burning in his chest with relieved by rest.       Review of Systems   Constitutional: Negative. Negative for chills, fever and unexpected weight change. HENT: Negative. Eyes: Negative. Respiratory: Negative. Negative for shortness of breath. Cardiovascular: Negative. Negative for chest pain. Gastrointestinal: Negative. Negative for diarrhea, nausea and vomiting. Endocrine: Negative. Genitourinary: Negative. Musculoskeletal: Negative. Skin: Negative. Neurological: Negative. All other systems reviewed and are negative. OBJECTIVE:     /82   Pulse 80     Labs:   CBC: No results for input(s): WBC, HGB, HCT, PLT in the last 72 hours. BMP:No results for input(s): NA, K, CO2, BUN, CREATININE, LABGLOM, GLUCOSE in the last 72 hours. BNP: No results for input(s): BNP in the last 72 hours. PT/INR: No results for input(s): PROTIME, INR in the last 72 hours. APTT:No results for input(s): APTT in the last 72 hours. CARDIAC ENZYMES:No results for input(s): CKTOTAL, CKMB, CKMBINDEX, TROPONINI in the last 72 hours. FASTING LIPID PANEL:  Lab Results   Component Value Date    HDL 34 11/14/2020    LDLCALC 71 11/14/2020    TRIG 74 11/14/2020     LIVER PROFILE:No results for input(s): AST, ALT, LABALBU in the last 72 hours.         Past Medical History: Diagnosis Date    Arthritis     Chronic kidney disease     enlarged prostate    Hypertension      Past Surgical History:   Procedure Laterality Date    CHOLECYSTECTOMY      COLONOSCOPY      ENDOSCOPY, COLON, DIAGNOSTIC      HERNIA REPAIR      JOINT REPLACEMENT Bilateral     SKIN BIOPSY       Family History   Problem Relation Age of Onset    Cancer Mother     Heart Attack Father     Heart Disease Father     High Blood Pressure Father     Arthritis Father     Cancer Brother     Substance Abuse Brother     Arthritis Brother     High Blood Pressure Brother     No Known Problems Maternal Aunt     Heart Disease Maternal Uncle     High Blood Pressure Maternal Uncle     Coronary Art Dis Maternal Uncle     No Known Problems Paternal Aunt     No Known Problems Paternal Uncle     Heart Attack Brother     Heart Disease Brother     Coronary Art Dis Brother     High Blood Pressure Brother      No Known Allergies  Current Outpatient Medications   Medication Sig Dispense Refill    metoprolol tartrate (LOPRESSOR) 25 MG tablet Take 0.5 tablets by mouth 2 times daily 30 tablet 3    aspirin 81 MG chewable tablet Take 1 tablet by mouth daily 30 tablet 3    isosorbide mononitrate (IMDUR) 30 MG extended release tablet Take 1 tablet by mouth daily 30 tablet 3    atorvastatin (LIPITOR) 40 MG tablet Take 1 tablet by mouth nightly 30 tablet 3    clopidogrel (PLAVIX) 75 MG tablet Take 1 tablet by mouth daily 30 tablet 3    finasteride (PROSCAR) 5 MG tablet Take 5 mg by mouth daily       No current facility-administered medications for this visit.       Social History     Socioeconomic History    Marital status:      Spouse name: Deana Morrow Number of children: 3    Years of education: 15    Highest education level: Not on file   Occupational History    Not on file   Social Needs    Financial resource strain: Not on file    Food insecurity     Worry: Not on file     Inability: Not on file  Transportation needs     Medical: Not on file     Non-medical: Not on file   Tobacco Use    Smoking status: Former Smoker     Packs/day: 1.50     Years: 22.00     Pack years: 33.00     Types: Cigarettes    Smokeless tobacco: Former User     Types: Chew   Substance and Sexual Activity    Alcohol use: Never     Frequency: Never    Drug use: Never    Sexual activity: Not Currently     Partners: Female   Lifestyle    Physical activity     Days per week: Not on file     Minutes per session: Not on file    Stress: Not on file   Relationships    Social connections     Talks on phone: Not on file     Gets together: Not on file     Attends Muslim service: Not on file     Active member of club or organization: Not on file     Attends meetings of clubs or organizations: Not on file     Relationship status: Not on file    Intimate partner violence     Fear of current or ex partner: Not on file     Emotionally abused: Not on file     Physically abused: Not on file     Forced sexual activity: Not on file   Other Topics Concern    Not on file   Social History Narrative    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[employer]     48 years only marriage    He has 1 daughter and 2 sons    Education high school 1 year college    Previously [employer] active duty 3 years   1967 he has gone to the [employer] for healthcare in the past    He enjoys shooting sports    Smoked until age 43 packs/day denies alcohol consumption or substance usage       Physical Examination:  /82   Pulse 80   Physical Exam  Vitals signs reviewed. Constitutional:       Appearance: He is well-developed. Neck:      Vascular: No carotid bruit or JVD. Cardiovascular:      Rate and Rhythm: Normal rate and regular rhythm. Heart sounds: Normal heart sounds. No murmur. No friction rub. No gallop. Pulmonary:      Effort: Pulmonary effort is normal. No respiratory distress. Breath sounds: Normal breath sounds. No wheezing or rales. Abdominal:      General: There is no distension. Tenderness: There is no abdominal tenderness. Lymphadenopathy:      Cervical: No cervical adenopathy. Skin:     General: Skin is warm and dry. ASSESSMENT:     Diagnosis Orders   1. NSTEMI (non-ST elevated myocardial infarction) ([facility]     2. Coronary artery disease involving native coronary artery of native heart without angina pectoris     3. Shortness of breath     4. [facility]         PLAN:  No orders of the defined types were placed in this encounter. No orders of the defined types were placed in this encounter. 1. Continue present medications  2. Repeat pulmonary function test  3. Suggest increase metoprolol  50 mg p.o. twice daily  4. Suggest increasing isosorbide monohydrate to 60 mg daily  5. Recommend follow-up assessment in 3 months    Return in about 3 months (around 3/14/2021) for return to Dr. Murphy Orr only. Brett Wolff MD 12/14/2020 1:02 PM [facility]      Thisdictation was generated by voice recognition computer software. Although all attempts are made to edit the dictation for accuracy, there may be errors in the transcription that are not intended.

## 2020-12-14 NOTE — PATIENT INSTRUCTIONS
Increase your Imdur to 60 mg (1) tablet daily. Increase your Metoprolol to 50 mg (1) tablet twice daily. See us back in 3 months. Covid testing  Jan 28th, 2021 (4 days prior to testing)    [place] at the Outpatient Registration located on the first floor of the [facility]. Date: Mon Feb 1st 2021 arrive arrive 10:30 A. M for 11:00 A. M    Pulmonary Function Test       Your doctor can get a great deal of information about your lungs and lung function by doing a series of tests called pulmonary function testing. These tests can tell your doctor what quantity of air you breathe with each breath, how efficiently you move air in and out of your lungs, and how well your lungs are delivering oxygen to your bloodstream.    · Restrain from using an inhalers 4 to 6 hours before testing. · Procedure will take approximately one hour. If for any reason you are unable to keep this appointment, please contact Outpatient Registration, [phone], as soon as possible to reschedule.

## 2020-12-14 NOTE — PROGRESS NOTES
[facility] - Pulmonary Function Test    [address]  [id number]  [phone]    Patient : Jacklyn Hinojosa   MRN : [id number]  CSN : [id number]  Pulmonologist : Gonzalo Denise MD  Date : 7/24/2019    ______________________________________________________________________    Interpretation :  1.  Spirometry is within normal limits. 2. Debra Seller actually is some improvement in spirometry postbronchodilator   particularly in small airways function and postbronchodilator spirometry   remains within normal limits. 3.  Lung volumes reveal a mild restrictive ventilatory defect. 4. Debra Seller is a moderate bordering on severe diffusion impairment which   when corrected for alveolar volume is a mild diffusion impairment.       Kamron Castrejon MD

## 2021-01-01 ENCOUNTER — APPOINTMENT (OUTPATIENT)
Dept: GENERAL RADIOLOGY | Age: 76
DRG: 871 | End: 2021-01-01
Payer: MEDICARE

## 2021-01-01 ENCOUNTER — APPOINTMENT (OUTPATIENT)
Dept: CT IMAGING | Age: 76
DRG: 193 | End: 2021-01-01
Payer: MEDICARE

## 2021-01-01 ENCOUNTER — OFFICE VISIT (OUTPATIENT)
Dept: PULMONOLOGY | Age: 76
End: 2021-01-01
Payer: MEDICARE

## 2021-01-01 ENCOUNTER — OFFICE VISIT (OUTPATIENT)
Dept: FAMILY MEDICINE CLINIC | Facility: CLINIC | Age: 76
End: 2021-01-01

## 2021-01-01 ENCOUNTER — CARE COORDINATION (OUTPATIENT)
Dept: CASE MANAGEMENT | Age: 76
End: 2021-01-01

## 2021-01-01 ENCOUNTER — OFFICE VISIT (OUTPATIENT)
Dept: UROLOGY | Facility: CLINIC | Age: 76
End: 2021-01-01

## 2021-01-01 ENCOUNTER — TELEPHONE (OUTPATIENT)
Dept: CARDIOLOGY CLINIC | Age: 76
End: 2021-01-01

## 2021-01-01 ENCOUNTER — APPOINTMENT (OUTPATIENT)
Dept: GENERAL RADIOLOGY | Age: 76
End: 2021-01-01
Payer: MEDICARE

## 2021-01-01 ENCOUNTER — HOSPITAL ENCOUNTER (OUTPATIENT)
Age: 76
Setting detail: OBSERVATION
Discharge: HOME | End: 2021-03-06
Attending: EMERGENCY MEDICINE | Admitting: HOSPITALIST
Payer: MEDICARE

## 2021-01-01 ENCOUNTER — OFFICE VISIT (OUTPATIENT)
Dept: CARDIOLOGY CLINIC | Age: 76
End: 2021-01-01
Payer: MEDICARE

## 2021-01-01 ENCOUNTER — HOSPITAL ENCOUNTER (INPATIENT)
Age: 76
LOS: 2 days | Discharge: HOSPICE | DRG: 871 | End: 2021-10-07
Attending: EMERGENCY MEDICINE | Admitting: INTERNAL MEDICINE
Payer: MEDICARE

## 2021-01-01 ENCOUNTER — HOSPITAL ENCOUNTER (OUTPATIENT)
Dept: CT IMAGING | Age: 76
Discharge: HOME | End: 2021-09-21
Payer: MEDICARE

## 2021-01-01 ENCOUNTER — APPOINTMENT (OUTPATIENT)
Dept: CT IMAGING | Age: 76
DRG: 871 | End: 2021-01-01
Payer: MEDICARE

## 2021-01-01 ENCOUNTER — HOSPITAL ENCOUNTER (INPATIENT)
Age: 76
LOS: 2 days | Discharge: HOME | DRG: 193 | End: 2021-07-24
Attending: PEDIATRICS | Admitting: FAMILY MEDICINE
Payer: MEDICARE

## 2021-01-01 ENCOUNTER — APPOINTMENT (OUTPATIENT)
Dept: GENERAL RADIOLOGY | Age: 76
DRG: 193 | End: 2021-01-01
Payer: MEDICARE

## 2021-01-01 VITALS
HEIGHT: 73 IN | BODY MASS INDEX: 26.64 KG/M2 | HEART RATE: 63 BPM | SYSTOLIC BLOOD PRESSURE: 132 MMHG | WEIGHT: 201 LBS | DIASTOLIC BLOOD PRESSURE: 72 MMHG | OXYGEN SATURATION: 96 %

## 2021-01-01 VITALS
HEIGHT: 73 IN | OXYGEN SATURATION: 94 % | WEIGHT: 197 LBS | HEART RATE: 68 BPM | TEMPERATURE: 96.8 F | SYSTOLIC BLOOD PRESSURE: 138 MMHG | DIASTOLIC BLOOD PRESSURE: 80 MMHG | BODY MASS INDEX: 26.11 KG/M2 | RESPIRATION RATE: 16 BRPM

## 2021-01-01 VITALS
TEMPERATURE: 96.9 F | BODY MASS INDEX: 25.18 KG/M2 | OXYGEN SATURATION: 95 % | SYSTOLIC BLOOD PRESSURE: 119 MMHG | HEIGHT: 73 IN | HEART RATE: 72 BPM | WEIGHT: 190 LBS | DIASTOLIC BLOOD PRESSURE: 78 MMHG | RESPIRATION RATE: 18 BRPM

## 2021-01-01 VITALS
HEART RATE: 70 BPM | RESPIRATION RATE: 16 BRPM | HEIGHT: 74 IN | DIASTOLIC BLOOD PRESSURE: 64 MMHG | SYSTOLIC BLOOD PRESSURE: 106 MMHG | WEIGHT: 195.6 LBS | BODY MASS INDEX: 25.1 KG/M2 | OXYGEN SATURATION: 91 % | TEMPERATURE: 96.6 F

## 2021-01-01 VITALS — TEMPERATURE: 97.6 F | BODY MASS INDEX: 26.32 KG/M2 | HEIGHT: 73 IN | WEIGHT: 198.6 LBS

## 2021-01-01 VITALS
TEMPERATURE: 98 F | WEIGHT: 198.1 LBS | HEART RATE: 112 BPM | DIASTOLIC BLOOD PRESSURE: 86 MMHG | SYSTOLIC BLOOD PRESSURE: 146 MMHG | OXYGEN SATURATION: 91 % | BODY MASS INDEX: 26.83 KG/M2 | HEIGHT: 72 IN | RESPIRATION RATE: 28 BRPM

## 2021-01-01 VITALS
HEIGHT: 72 IN | DIASTOLIC BLOOD PRESSURE: 84 MMHG | BODY MASS INDEX: 27.22 KG/M2 | OXYGEN SATURATION: 86 % | WEIGHT: 201 LBS | HEART RATE: 73 BPM | SYSTOLIC BLOOD PRESSURE: 132 MMHG | TEMPERATURE: 96.3 F

## 2021-01-01 VITALS
BODY MASS INDEX: 26.37 KG/M2 | HEART RATE: 77 BPM | DIASTOLIC BLOOD PRESSURE: 70 MMHG | SYSTOLIC BLOOD PRESSURE: 116 MMHG | TEMPERATURE: 97.8 F | WEIGHT: 199 LBS | OXYGEN SATURATION: 94 % | RESPIRATION RATE: 16 BRPM | HEIGHT: 73 IN

## 2021-01-01 VITALS
RESPIRATION RATE: 30 BRPM | WEIGHT: 193 LBS | OXYGEN SATURATION: 94 % | DIASTOLIC BLOOD PRESSURE: 68 MMHG | SYSTOLIC BLOOD PRESSURE: 116 MMHG | HEIGHT: 73 IN | BODY MASS INDEX: 25.58 KG/M2 | TEMPERATURE: 97.3 F | HEART RATE: 74 BPM

## 2021-01-01 VITALS
HEART RATE: 68 BPM | BODY MASS INDEX: 26.64 KG/M2 | DIASTOLIC BLOOD PRESSURE: 70 MMHG | WEIGHT: 201 LBS | SYSTOLIC BLOOD PRESSURE: 136 MMHG | HEIGHT: 73 IN

## 2021-01-01 DIAGNOSIS — J84.10 PULMONARY FIBROSIS (HCC): Primary | ICD-10-CM

## 2021-01-01 DIAGNOSIS — I10 ESSENTIAL HYPERTENSION: ICD-10-CM

## 2021-01-01 DIAGNOSIS — N13.8 BENIGN PROSTATIC HYPERPLASIA WITH URINARY OBSTRUCTION: ICD-10-CM

## 2021-01-01 DIAGNOSIS — J43.9 LUNG BULLAE (HCC): ICD-10-CM

## 2021-01-01 DIAGNOSIS — R07.9 CHEST PAIN, UNSPECIFIED TYPE: ICD-10-CM

## 2021-01-01 DIAGNOSIS — R06.02 SHORTNESS OF BREATH: ICD-10-CM

## 2021-01-01 DIAGNOSIS — R09.02 HYPOXIA: Primary | ICD-10-CM

## 2021-01-01 DIAGNOSIS — E78.2 MIXED HYPERLIPIDEMIA: ICD-10-CM

## 2021-01-01 DIAGNOSIS — R06.09 DOE (DYSPNEA ON EXERTION): ICD-10-CM

## 2021-01-01 DIAGNOSIS — R97.20 ELEVATED PROSTATE SPECIFIC ANTIGEN (PSA): ICD-10-CM

## 2021-01-01 DIAGNOSIS — J84.10 PULMONARY FIBROSIS (HCC): ICD-10-CM

## 2021-01-01 DIAGNOSIS — I25.10 CORONARY ARTERY DISEASE INVOLVING NATIVE CORONARY ARTERY OF NATIVE HEART WITHOUT ANGINA PECTORIS: Primary | ICD-10-CM

## 2021-01-01 DIAGNOSIS — E78.5 HYPERLIPIDEMIA, UNSPECIFIED HYPERLIPIDEMIA TYPE: ICD-10-CM

## 2021-01-01 DIAGNOSIS — J44.1 COPD EXACERBATION (HCC): ICD-10-CM

## 2021-01-01 DIAGNOSIS — J96.01 ACUTE HYPOXEMIC RESPIRATORY FAILURE (HCC): Primary | ICD-10-CM

## 2021-01-01 DIAGNOSIS — I10 ESSENTIAL HYPERTENSION: Chronic | ICD-10-CM

## 2021-01-01 DIAGNOSIS — J96.11 CHRONIC RESPIRATORY FAILURE WITH HYPOXIA (HCC): ICD-10-CM

## 2021-01-01 DIAGNOSIS — R06.2 WHEEZING: ICD-10-CM

## 2021-01-01 DIAGNOSIS — I25.10 CORONARY ARTERY DISEASE INVOLVING NATIVE CORONARY ARTERY OF NATIVE HEART WITHOUT ANGINA PECTORIS: ICD-10-CM

## 2021-01-01 DIAGNOSIS — R97.20 ELEVATED PROSTATE SPECIFIC ANTIGEN (PSA): Primary | ICD-10-CM

## 2021-01-01 DIAGNOSIS — R33.9 URINARY RETENTION: ICD-10-CM

## 2021-01-01 DIAGNOSIS — J44.9 CHRONIC OBSTRUCTIVE PULMONARY DISEASE, UNSPECIFIED COPD TYPE (HCC): ICD-10-CM

## 2021-01-01 DIAGNOSIS — N40.1 BENIGN PROSTATIC HYPERPLASIA WITH URINARY OBSTRUCTION: ICD-10-CM

## 2021-01-01 DIAGNOSIS — Z79.01 ANTICOAGULATED: ICD-10-CM

## 2021-01-01 DIAGNOSIS — R06.09 EXERTIONAL DYSPNEA: Primary | ICD-10-CM

## 2021-01-01 DIAGNOSIS — J84.112 IPF (IDIOPATHIC PULMONARY FIBROSIS) (HCC): Primary | ICD-10-CM

## 2021-01-01 DIAGNOSIS — Z23 IMMUNIZATION DUE: ICD-10-CM

## 2021-01-01 DIAGNOSIS — I21.4 NSTEMI (NON-ST ELEVATED MYOCARDIAL INFARCTION) (HCC): Primary | ICD-10-CM

## 2021-01-01 DIAGNOSIS — R93.89 ABNORMAL CHEST X-RAY: ICD-10-CM

## 2021-01-01 DIAGNOSIS — J18.9 COMMUNITY ACQUIRED PNEUMONIA OF RIGHT LOWER LOBE OF LUNG: ICD-10-CM

## 2021-01-01 DIAGNOSIS — Z00.00 WELLNESS EXAMINATION: ICD-10-CM

## 2021-01-01 DIAGNOSIS — R73.01 IMPAIRED FASTING GLUCOSE: ICD-10-CM

## 2021-01-01 LAB
ADENOVIRUS BY PCR: NOT DETECTED
ADENOVIRUS BY PCR: NOT DETECTED
ALBUMIN SERPL-MCNC: 3.5 G/DL (ref 3.5–5.2)
ALBUMIN SERPL-MCNC: 3.5 G/DL (ref 3.5–5.2)
ALBUMIN SERPL-MCNC: 3.7 G/DL (ref 3.5–5.2)
ALBUMIN SERPL-MCNC: 3.7 G/DL (ref 3.5–5.2)
ALBUMIN SERPL-MCNC: 3.8 G/DL (ref 3.5–5.2)
ALBUMIN SERPL-MCNC: 4.1 G/DL (ref 3.5–5.2)
ALBUMIN SERPL-MCNC: 4.2 G/DL (ref 3.5–5.2)
ALBUMIN SERPL-MCNC: 4.2 G/DL (ref 3.5–5.2)
ALP BLD-CCNC: 107 U/L (ref 40–130)
ALP BLD-CCNC: 67 U/L (ref 40–130)
ALP BLD-CCNC: 68 U/L (ref 40–130)
ALP BLD-CCNC: 72 U/L (ref 40–130)
ALP BLD-CCNC: 76 U/L (ref 40–130)
ALP BLD-CCNC: 76 U/L (ref 40–130)
ALP BLD-CCNC: 77 U/L (ref 40–130)
ALP BLD-CCNC: 92 U/L (ref 40–130)
ALPHA-1 ANTITRYPSIN: 131 MG/DL (ref 90–200)
ALT SERPL-CCNC: 10 U/L (ref 5–41)
ALT SERPL-CCNC: 10 U/L (ref 5–41)
ALT SERPL-CCNC: 12 U/L (ref 5–41)
ALT SERPL-CCNC: 12 U/L (ref 5–41)
ALT SERPL-CCNC: 13 U/L (ref 5–41)
ALT SERPL-CCNC: 19 U/L (ref 5–41)
ALT SERPL-CCNC: 49 U/L (ref 5–41)
ALT SERPL-CCNC: 7 U/L (ref 5–41)
ANA IGG, ELISA: NORMAL
ANION GAP SERPL CALCULATED.3IONS-SCNC: 10 MMOL/L (ref 7–19)
ANION GAP SERPL CALCULATED.3IONS-SCNC: 11 MMOL/L (ref 7–19)
ANION GAP SERPL CALCULATED.3IONS-SCNC: 13 MMOL/L (ref 7–19)
ANION GAP SERPL CALCULATED.3IONS-SCNC: 13 MMOL/L (ref 7–19)
ANION GAP SERPL CALCULATED.3IONS-SCNC: 14 MMOL/L (ref 7–19)
ANION GAP SERPL CALCULATED.3IONS-SCNC: 6 MMOL/L (ref 7–19)
ANION GAP SERPL CALCULATED.3IONS-SCNC: 7 MMOL/L (ref 7–19)
ANION GAP SERPL CALCULATED.3IONS-SCNC: 9 MMOL/L (ref 7–19)
AST SERPL-CCNC: 10 U/L (ref 5–40)
AST SERPL-CCNC: 14 U/L (ref 5–40)
AST SERPL-CCNC: 15 U/L (ref 5–40)
AST SERPL-CCNC: 18 U/L (ref 5–40)
AST SERPL-CCNC: 18 U/L (ref 5–40)
AST SERPL-CCNC: 35 U/L (ref 5–40)
BASE EXCESS ARTERIAL: -0.3 MMOL/L (ref -2–2)
BASE EXCESS ARTERIAL: -0.4 MMOL/L (ref -2–2)
BASE EXCESS ARTERIAL: -1.8 MMOL/L (ref -2–2)
BASE EXCESS ARTERIAL: -2 MMOL/L (ref -2–2)
BASOPHILS ABSOLUTE: 0 K/UL (ref 0–0.2)
BASOPHILS ABSOLUTE: 0 K/UL (ref 0–0.2)
BASOPHILS ABSOLUTE: 0.1 K/UL (ref 0–0.2)
BASOPHILS ABSOLUTE: 0.1 K/UL (ref 0–0.2)
BASOPHILS RELATIVE PERCENT: 0.2 % (ref 0–1)
BASOPHILS RELATIVE PERCENT: 0.3 % (ref 0–1)
BILIRUB BLD-MCNC: NEGATIVE MG/DL
BILIRUB SERPL-MCNC: 0.3 MG/DL (ref 0.2–1.2)
BILIRUB SERPL-MCNC: 0.4 MG/DL (ref 0.2–1.2)
BILIRUB SERPL-MCNC: 0.5 MG/DL (ref 0.2–1.2)
BILIRUB SERPL-MCNC: 0.5 MG/DL (ref 0.2–1.2)
BILIRUB SERPL-MCNC: 0.8 MG/DL (ref 0.2–1.2)
BILIRUB SERPL-MCNC: 1.4 MG/DL (ref 0.2–1.2)
BILIRUBIN URINE: NEGATIVE
BLOOD CULTURE, ROUTINE: NORMAL
BLOOD, URINE: NEGATIVE
BORDETELLA PARAPERTUSSIS BY PCR: NOT DETECTED
BORDETELLA PARAPERTUSSIS BY PCR: NOT DETECTED
BORDETELLA PERTUSSIS BY PCR: NOT DETECTED
BORDETELLA PERTUSSIS BY PCR: NOT DETECTED
BUN BLDV-MCNC: 16 MG/DL (ref 8–23)
BUN BLDV-MCNC: 18 MG/DL (ref 8–23)
BUN BLDV-MCNC: 21 MG/DL (ref 8–23)
BUN BLDV-MCNC: 22 MG/DL (ref 8–23)
BUN BLDV-MCNC: 23 MG/DL (ref 8–23)
BUN BLDV-MCNC: 23 MG/DL (ref 8–23)
BUN BLDV-MCNC: 25 MG/DL (ref 8–23)
BUN BLDV-MCNC: 30 MG/DL (ref 8–23)
CALCIUM SERPL-MCNC: 10 MG/DL (ref 8.8–10.2)
CALCIUM SERPL-MCNC: 9.2 MG/DL (ref 8.8–10.2)
CALCIUM SERPL-MCNC: 9.3 MG/DL (ref 8.8–10.2)
CALCIUM SERPL-MCNC: 9.5 MG/DL (ref 8.8–10.2)
CALCIUM SERPL-MCNC: 9.5 MG/DL (ref 8.8–10.2)
CALCIUM SERPL-MCNC: 9.6 MG/DL (ref 8.8–10.2)
CARBOXYHEMOGLOBIN ARTERIAL: 2 % (ref 0–5)
CARBOXYHEMOGLOBIN ARTERIAL: 2.4 % (ref 0–5)
CARBOXYHEMOGLOBIN ARTERIAL: 2.8 % (ref 0–5)
CARBOXYHEMOGLOBIN ARTERIAL: 3 % (ref 0–5)
CHLAMYDOPHILIA PNEUMONIAE BY PCR: NOT DETECTED
CHLAMYDOPHILIA PNEUMONIAE BY PCR: NOT DETECTED
CHLORIDE BLD-SCNC: 100 MMOL/L (ref 98–111)
CHLORIDE BLD-SCNC: 101 MMOL/L (ref 98–111)
CHLORIDE BLD-SCNC: 101 MMOL/L (ref 98–111)
CHLORIDE BLD-SCNC: 102 MMOL/L (ref 98–111)
CHLORIDE BLD-SCNC: 103 MMOL/L (ref 98–111)
CHLORIDE BLD-SCNC: 106 MMOL/L (ref 98–111)
CHOLESTEROL, TOTAL: 80 MG/DL (ref 160–199)
CLARITY, POC: CLEAR
CLARITY: CLEAR
CO2: 20 MMOL/L (ref 22–29)
CO2: 22 MMOL/L (ref 22–29)
CO2: 22 MMOL/L (ref 22–29)
CO2: 24 MMOL/L (ref 22–29)
CO2: 24 MMOL/L (ref 22–29)
CO2: 25 MMOL/L (ref 22–29)
CO2: 25 MMOL/L (ref 22–29)
CO2: 30 MMOL/L (ref 22–29)
COLOR UR: YELLOW
COLOR: YELLOW
CORONAVIRUS 229E BY PCR: NOT DETECTED
CORONAVIRUS 229E BY PCR: NOT DETECTED
CORONAVIRUS HKU1 BY PCR: NOT DETECTED
CORONAVIRUS HKU1 BY PCR: NOT DETECTED
CORONAVIRUS NL63 BY PCR: NOT DETECTED
CORONAVIRUS NL63 BY PCR: NOT DETECTED
CORONAVIRUS OC43 BY PCR: NOT DETECTED
CORONAVIRUS OC43 BY PCR: NOT DETECTED
CREAT SERPL-MCNC: 0.7 MG/DL (ref 0.5–1.2)
CREAT SERPL-MCNC: 0.8 MG/DL (ref 0.5–1.2)
CREAT SERPL-MCNC: 0.8 MG/DL (ref 0.5–1.2)
CREAT SERPL-MCNC: 0.9 MG/DL (ref 0.5–1.2)
CREAT SERPL-MCNC: 1 MG/DL (ref 0.5–1.2)
D DIMER: 0.33 UG/ML FEU (ref 0–0.48)
D DIMER: <0.27 UG/ML FEU (ref 0–0.48)
EKG P AXIS: 42 DEGREES
EKG P AXIS: 51 DEGREES
EKG P AXIS: 52 DEGREES
EKG P AXIS: 53 DEGREES
EKG P-R INTERVAL: 148 MS
EKG P-R INTERVAL: 152 MS
EKG P-R INTERVAL: 160 MS
EKG P-R INTERVAL: 162 MS
EKG Q-T INTERVAL: 356 MS
EKG Q-T INTERVAL: 358 MS
EKG Q-T INTERVAL: 430 MS
EKG Q-T INTERVAL: 434 MS
EKG QRS DURATION: 86 MS
EKG QRS DURATION: 88 MS
EKG QTC CALCULATION (BAZETT): 408 MS
EKG QTC CALCULATION (BAZETT): 421 MS
EKG QTC CALCULATION (BAZETT): 427 MS
EKG QTC CALCULATION (BAZETT): 431 MS
EKG T AXIS: 20 DEGREES
EKG T AXIS: 22 DEGREES
EKG T AXIS: 34 DEGREES
EKG T AXIS: 44 DEGREES
EOSINOPHILS ABSOLUTE: 0 K/UL (ref 0–0.6)
EOSINOPHILS ABSOLUTE: 0 K/UL (ref 0–0.6)
EOSINOPHILS ABSOLUTE: 0.1 K/UL (ref 0–0.6)
EOSINOPHILS ABSOLUTE: 0.3 K/UL (ref 0–0.6)
EOSINOPHILS RELATIVE PERCENT: 0 % (ref 0–5)
EOSINOPHILS RELATIVE PERCENT: 0 % (ref 0–5)
EOSINOPHILS RELATIVE PERCENT: 0.3 % (ref 0–5)
EOSINOPHILS RELATIVE PERCENT: 1.6 % (ref 0–5)
GFR AFRICAN AMERICAN: >59
GFR NON-AFRICAN AMERICAN: >60
GLUCOSE BLD-MCNC: 116 MG/DL (ref 74–109)
GLUCOSE BLD-MCNC: 118 MG/DL (ref 74–109)
GLUCOSE BLD-MCNC: 127 MG/DL (ref 74–109)
GLUCOSE BLD-MCNC: 137 MG/DL (ref 70–99)
GLUCOSE BLD-MCNC: 144 MG/DL (ref 74–109)
GLUCOSE BLD-MCNC: 145 MG/DL (ref 70–99)
GLUCOSE BLD-MCNC: 149 MG/DL (ref 70–99)
GLUCOSE BLD-MCNC: 149 MG/DL (ref 74–109)
GLUCOSE BLD-MCNC: 152 MG/DL (ref 74–109)
GLUCOSE BLD-MCNC: 158 MG/DL (ref 70–99)
GLUCOSE BLD-MCNC: 158 MG/DL (ref 74–109)
GLUCOSE BLD-MCNC: 163 MG/DL (ref 70–99)
GLUCOSE BLD-MCNC: 176 MG/DL (ref 74–109)
GLUCOSE BLD-MCNC: 177 MG/DL (ref 70–99)
GLUCOSE BLD-MCNC: 194 MG/DL (ref 70–99)
GLUCOSE BLD-MCNC: 194 MG/DL (ref 70–99)
GLUCOSE BLD-MCNC: 223 MG/DL (ref 70–99)
GLUCOSE UR STRIP-MCNC: ABNORMAL MG/DL
GLUCOSE URINE: NEGATIVE MG/DL
HBA1C MFR BLD: 5.7 % (ref 4–6)
HBA1C MFR BLD: 5.8 % (ref 4.8–5.6)
HCO3 ARTERIAL: 21.1 MMOL/L (ref 22–26)
HCO3 ARTERIAL: 22.2 MMOL/L (ref 22–26)
HCO3 ARTERIAL: 23.1 MMOL/L (ref 22–26)
HCO3 ARTERIAL: 24.1 MMOL/L (ref 22–26)
HCT VFR BLD CALC: 42.1 % (ref 42–52)
HCT VFR BLD CALC: 42.2 % (ref 42–52)
HCT VFR BLD CALC: 43.8 % (ref 42–52)
HCT VFR BLD CALC: 44.2 % (ref 42–52)
HCT VFR BLD CALC: 44.5 % (ref 42–52)
HCT VFR BLD CALC: 45.5 % (ref 42–52)
HCT VFR BLD CALC: 46 % (ref 42–52)
HCT VFR BLD CALC: 46.7 % (ref 42–52)
HDLC SERPL-MCNC: 26 MG/DL (ref 55–121)
HEMOGLOBIN, ART, EXTENDED: 14.8 G/DL (ref 14–18)
HEMOGLOBIN, ART, EXTENDED: 15 G/DL (ref 14–18)
HEMOGLOBIN, ART, EXTENDED: 15.1 G/DL (ref 14–18)
HEMOGLOBIN, ART, EXTENDED: 15.5 G/DL (ref 14–18)
HEMOGLOBIN: 14 G/DL (ref 14–18)
HEMOGLOBIN: 14.4 G/DL (ref 14–18)
HEMOGLOBIN: 14.5 G/DL (ref 14–18)
HEMOGLOBIN: 14.5 G/DL (ref 14–18)
HEMOGLOBIN: 14.7 G/DL (ref 14–18)
HEMOGLOBIN: 14.9 G/DL (ref 14–18)
HEMOGLOBIN: 14.9 G/DL (ref 14–18)
HEMOGLOBIN: 15.6 G/DL (ref 14–18)
HUMAN METAPNEUMOVIRUS BY PCR: NOT DETECTED
HUMAN METAPNEUMOVIRUS BY PCR: NOT DETECTED
HUMAN RHINOVIRUS/ENTEROVIRUS BY PCR: NOT DETECTED
HUMAN RHINOVIRUS/ENTEROVIRUS BY PCR: NOT DETECTED
IMMATURE GRANULOCYTES #: 0.1 K/UL
IMMATURE GRANULOCYTES #: 0.2 K/UL
IMMATURE GRANULOCYTES #: 0.4 K/UL
IMMATURE GRANULOCYTES #: 0.7 K/UL
INFLUENZA A (NO SUBTYPE) BY PCR: NOT DETECTED
INFLUENZA A BY PCR: NOT DETECTED
INFLUENZA A BY PCR: NOT DETECTED
INFLUENZA A H1 BY PCR: NOT DETECTED
INFLUENZA A H1-2009 BY PCR: NOT DETECTED
INFLUENZA A H3 BY PCR: NOT DETECTED
INFLUENZA B BY PCR: NOT DETECTED
INFLUENZA B BY PCR: NOT DETECTED
KETONES UR QL: NEGATIVE
KETONES, URINE: ABNORMAL MG/DL
LACTIC ACID: 1.1 MMOL/L (ref 0.5–1.9)
LDL CHOLESTEROL CALCULATED: 38 MG/DL
LEUKOCYTE EST, POC: NEGATIVE
LEUKOCYTE ESTERASE, URINE: NEGATIVE
LV EF: 70 %
LVEF MODALITY: NORMAL
LYMPHOCYTES ABSOLUTE: 0.8 K/UL (ref 1.1–4.5)
LYMPHOCYTES ABSOLUTE: 1 K/UL (ref 1.1–4.5)
LYMPHOCYTES ABSOLUTE: 1.4 K/UL (ref 1.1–4.5)
LYMPHOCYTES ABSOLUTE: 1.9 K/UL (ref 1.1–4.5)
LYMPHOCYTES RELATIVE PERCENT: 2.5 % (ref 20–40)
LYMPHOCYTES RELATIVE PERCENT: 4.1 % (ref 20–40)
LYMPHOCYTES RELATIVE PERCENT: 7.8 % (ref 20–40)
LYMPHOCYTES RELATIVE PERCENT: 9 % (ref 20–40)
MAGNESIUM: 1.9 MG/DL (ref 1.6–2.4)
MAGNESIUM: 2.1 MG/DL (ref 1.6–2.4)
MCH RBC QN AUTO: 29.1 PG (ref 27–31)
MCH RBC QN AUTO: 29.4 PG (ref 27–31)
MCH RBC QN AUTO: 29.6 PG (ref 27–31)
MCH RBC QN AUTO: 29.7 PG (ref 27–31)
MCH RBC QN AUTO: 29.8 PG (ref 27–31)
MCH RBC QN AUTO: 29.8 PG (ref 27–31)
MCH RBC QN AUTO: 30 PG (ref 27–31)
MCH RBC QN AUTO: 30.2 PG (ref 27–31)
MCHC RBC AUTO-ENTMCNC: 32.4 G/DL (ref 33–37)
MCHC RBC AUTO-ENTMCNC: 32.6 G/DL (ref 33–37)
MCHC RBC AUTO-ENTMCNC: 32.7 G/DL (ref 33–37)
MCHC RBC AUTO-ENTMCNC: 32.8 G/DL (ref 33–37)
MCHC RBC AUTO-ENTMCNC: 33.2 G/DL (ref 33–37)
MCHC RBC AUTO-ENTMCNC: 33.4 G/DL (ref 33–37)
MCHC RBC AUTO-ENTMCNC: 33.6 G/DL (ref 33–37)
MCHC RBC AUTO-ENTMCNC: 34.2 G/DL (ref 33–37)
MCV RBC AUTO: 87.7 FL (ref 80–94)
MCV RBC AUTO: 88.6 FL (ref 80–94)
MCV RBC AUTO: 89.1 FL (ref 80–94)
MCV RBC AUTO: 89.2 FL (ref 80–94)
MCV RBC AUTO: 89.7 FL (ref 80–94)
MCV RBC AUTO: 90.1 FL (ref 80–94)
MCV RBC AUTO: 91.6 FL (ref 80–94)
MCV RBC AUTO: 91.6 FL (ref 80–94)
METHEMOGLOBIN ARTERIAL: 0.8 %
METHEMOGLOBIN ARTERIAL: 1 %
METHEMOGLOBIN ARTERIAL: 1.1 %
METHEMOGLOBIN ARTERIAL: 1.2 %
MONOCYTES ABSOLUTE: 1.1 K/UL (ref 0–0.9)
MONOCYTES ABSOLUTE: 1.2 K/UL (ref 0–0.9)
MONOCYTES ABSOLUTE: 1.8 K/UL (ref 0–0.9)
MONOCYTES ABSOLUTE: 1.9 K/UL (ref 0–0.9)
MONOCYTES RELATIVE PERCENT: 4.7 % (ref 0–10)
MONOCYTES RELATIVE PERCENT: 4.9 % (ref 0–10)
MONOCYTES RELATIVE PERCENT: 6 % (ref 0–10)
MONOCYTES RELATIVE PERCENT: 9.6 % (ref 0–10)
MRSA SCREEN RT-PCR: NOT DETECTED
MYCOPLASMA PNEUMONIAE BY PCR: NOT DETECTED
MYCOPLASMA PNEUMONIAE BY PCR: NOT DETECTED
NEUTROPHILS ABSOLUTE: 14.7 K/UL (ref 1.5–7.5)
NEUTROPHILS ABSOLUTE: 18 K/UL (ref 1.5–7.5)
NEUTROPHILS ABSOLUTE: 22.7 K/UL (ref 1.5–7.5)
NEUTROPHILS ABSOLUTE: 28.4 K/UL (ref 1.5–7.5)
NEUTROPHILS RELATIVE PERCENT: 80.3 % (ref 50–65)
NEUTROPHILS RELATIVE PERCENT: 83.7 % (ref 50–65)
NEUTROPHILS RELATIVE PERCENT: 89.2 % (ref 50–65)
NEUTROPHILS RELATIVE PERCENT: 90.3 % (ref 50–65)
NITRITE UR-MCNC: NEGATIVE MG/ML
NITRITE, URINE: NEGATIVE
O2 CONTENT ARTERIAL: 18.4 ML/DL
O2 CONTENT ARTERIAL: 18.5 ML/DL
O2 CONTENT ARTERIAL: 19.1 ML/DL
O2 CONTENT ARTERIAL: 19.2 ML/DL
O2 SAT, ARTERIAL: 86.7 %
O2 SAT, ARTERIAL: 87.8 %
O2 SAT, ARTERIAL: 89.3 %
O2 SAT, ARTERIAL: 91.1 %
O2 THERAPY: ABNORMAL
PARAINFLUENZA VIRUS 1 BY PCR: NOT DETECTED
PARAINFLUENZA VIRUS 1 BY PCR: NOT DETECTED
PARAINFLUENZA VIRUS 2 BY PCR: NOT DETECTED
PARAINFLUENZA VIRUS 2 BY PCR: NOT DETECTED
PARAINFLUENZA VIRUS 3 BY PCR: NOT DETECTED
PARAINFLUENZA VIRUS 3 BY PCR: NOT DETECTED
PARAINFLUENZA VIRUS 4 BY PCR: NOT DETECTED
PARAINFLUENZA VIRUS 4 BY PCR: NOT DETECTED
PCO2 ARTERIAL: 31 MMHG (ref 35–45)
PCO2 ARTERIAL: 34 MMHG (ref 35–45)
PCO2 ARTERIAL: 35 MMHG (ref 35–45)
PCO2 ARTERIAL: 38 MMHG (ref 35–45)
PDW BLD-RTO: 13.5 % (ref 11.5–14.5)
PDW BLD-RTO: 13.7 % (ref 11.5–14.5)
PDW BLD-RTO: 14 % (ref 11.5–14.5)
PDW BLD-RTO: 14 % (ref 11.5–14.5)
PDW BLD-RTO: 14.5 % (ref 11.5–14.5)
PDW BLD-RTO: 14.6 % (ref 11.5–14.5)
PERFORMED ON: ABNORMAL
PH ARTERIAL: 7.41 (ref 7.35–7.45)
PH ARTERIAL: 7.41 (ref 7.35–7.45)
PH ARTERIAL: 7.44 (ref 7.35–7.45)
PH ARTERIAL: 7.44 (ref 7.35–7.45)
PH UA: 5.5 (ref 5–8)
PH UR: 6 [PH] (ref 5–8)
PHOSPHORUS: 2.8 MG/DL (ref 2.5–4.5)
PLATELET # BLD: 184 K/UL (ref 130–400)
PLATELET # BLD: 190 K/UL (ref 130–400)
PLATELET # BLD: 191 K/UL (ref 130–400)
PLATELET # BLD: 199 K/UL (ref 130–400)
PLATELET # BLD: 214 K/UL (ref 130–400)
PLATELET # BLD: 223 K/UL (ref 130–400)
PLATELET # BLD: 258 K/UL (ref 130–400)
PLATELET # BLD: 268 K/UL (ref 130–400)
PMV BLD AUTO: 10.3 FL (ref 9.4–12.4)
PMV BLD AUTO: 10.4 FL (ref 9.4–12.4)
PMV BLD AUTO: 10.5 FL (ref 9.4–12.4)
PMV BLD AUTO: 9.2 FL (ref 9.4–12.4)
PMV BLD AUTO: 9.7 FL (ref 9.4–12.4)
PMV BLD AUTO: 9.9 FL (ref 9.4–12.4)
PO2 ARTERIAL: 50 MMHG (ref 80–100)
PO2 ARTERIAL: 53 MMHG (ref 80–100)
PO2 ARTERIAL: 56 MMHG (ref 80–100)
PO2 ARTERIAL: 60 MMHG (ref 80–100)
POTASSIUM REFLEX MAGNESIUM: 4.7 MMOL/L (ref 3.5–5)
POTASSIUM SERPL-SCNC: 4.1 MMOL/L (ref 3.5–5)
POTASSIUM SERPL-SCNC: 4.3 MMOL/L (ref 3.5–5)
POTASSIUM SERPL-SCNC: 4.3 MMOL/L (ref 3.5–5)
POTASSIUM SERPL-SCNC: 4.4 MMOL/L (ref 3.5–5)
POTASSIUM SERPL-SCNC: 4.5 MMOL/L (ref 3.5–5)
POTASSIUM SERPL-SCNC: 4.7 MMOL/L (ref 3.5–5)
POTASSIUM SERPL-SCNC: 5.2 MMOL/L (ref 3.5–5)
POTASSIUM, WHOLE BLOOD: 3.7
POTASSIUM, WHOLE BLOOD: 3.7
POTASSIUM, WHOLE BLOOD: 4
POTASSIUM, WHOLE BLOOD: 4.3
PRO-BNP: 298 PG/ML (ref 0–1800)
PRO-BNP: 410 PG/ML (ref 0–1800)
PRO-BNP: 439 PG/ML (ref 0–1800)
PROCALCITONIN: 0.63 NG/ML (ref 0–0.09)
PROT UR STRIP-MCNC: NEGATIVE MG/DL
PROTEIN UA: ABNORMAL MG/DL
PSA SERPL-MCNC: 1.93 NG/ML (ref 0–4)
RBC # BLD: 4.73 M/UL (ref 4.7–6.1)
RBC # BLD: 4.8 M/UL (ref 4.7–6.1)
RBC # BLD: 4.86 M/UL (ref 4.7–6.1)
RBC # BLD: 4.86 M/UL (ref 4.7–6.1)
RBC # BLD: 4.99 M/UL (ref 4.7–6.1)
RBC # BLD: 5.02 M/UL (ref 4.7–6.1)
RBC # BLD: 5.07 M/UL (ref 4.7–6.1)
RBC # BLD: 5.24 M/UL (ref 4.7–6.1)
RBC # UR STRIP: NEGATIVE /UL
RESPIRATORY SYNCYTIAL VIRUS BY PCR: NOT DETECTED
RESPIRATORY SYNCYTIAL VIRUS BY PCR: NOT DETECTED
RHEUMATOID FACTOR: <10 IU/ML
SARS-COV-2, NAAT: NOT DETECTED
SARS-COV-2, PCR: NOT DETECTED
SARS-COV-2, PCR: NOT DETECTED
SEDIMENTATION RATE, ERYTHROCYTE: 2 MM/HR (ref 0–15)
SODIUM BLD-SCNC: 135 MMOL/L (ref 136–145)
SODIUM BLD-SCNC: 136 MMOL/L (ref 136–145)
SODIUM BLD-SCNC: 137 MMOL/L (ref 136–145)
SODIUM BLD-SCNC: 137 MMOL/L (ref 136–145)
SODIUM BLD-SCNC: 138 MMOL/L (ref 136–145)
SODIUM BLD-SCNC: 139 MMOL/L (ref 136–145)
SP GR UR: 1.02 (ref 1–1.03)
SPECIFIC GRAVITY UA: 1.03 (ref 1–1.03)
T4 FREE: 1.17 NG/DL (ref 0.93–1.7)
TOTAL PROTEIN: 6.3 G/DL (ref 6.6–8.7)
TOTAL PROTEIN: 6.5 G/DL (ref 6.6–8.7)
TOTAL PROTEIN: 6.6 G/DL (ref 6.6–8.7)
TOTAL PROTEIN: 6.7 G/DL (ref 6.6–8.7)
TOTAL PROTEIN: 6.7 G/DL (ref 6.6–8.7)
TOTAL PROTEIN: 6.9 G/DL (ref 6.6–8.7)
TOTAL PROTEIN: 7 G/DL (ref 6.6–8.7)
TOTAL PROTEIN: 7.4 G/DL (ref 6.6–8.7)
TRIGL SERPL-MCNC: 79 MG/DL (ref 0–149)
TROPONIN: <0.01 NG/ML (ref 0–0.03)
TSH REFLEX FT4: 3.45 UIU/ML (ref 0.35–5.5)
UROBILINOGEN UR QL: NORMAL
UROBILINOGEN, URINE: 0.2 E.U./DL
WBC # BLD: 10.8 K/UL (ref 4.8–10.8)
WBC # BLD: 18.3 K/UL (ref 4.8–10.8)
WBC # BLD: 19 K/UL (ref 4.8–10.8)
WBC # BLD: 21.5 K/UL (ref 4.8–10.8)
WBC # BLD: 23.7 K/UL (ref 4.8–10.8)
WBC # BLD: 23.8 K/UL (ref 4.8–10.8)
WBC # BLD: 25.1 K/UL (ref 4.8–10.8)
WBC # BLD: 31.8 K/UL (ref 4.8–10.8)

## 2021-01-01 PROCEDURE — 6370000000 HC RX 637 (ALT 250 FOR IP): Performed by: HOSPITALIST

## 2021-01-01 PROCEDURE — 85025 COMPLETE CBC W/AUTO DIFF WBC: CPT

## 2021-01-01 PROCEDURE — 6360000002 HC RX W HCPCS: Performed by: INTERNAL MEDICINE

## 2021-01-01 PROCEDURE — 93010 ELECTROCARDIOGRAM REPORT: CPT | Performed by: INTERNAL MEDICINE

## 2021-01-01 PROCEDURE — 2500000003 HC RX 250 WO HCPCS: Performed by: INTERNAL MEDICINE

## 2021-01-01 PROCEDURE — 71045 X-RAY EXAM CHEST 1 VIEW: CPT

## 2021-01-01 PROCEDURE — 93005 ELECTROCARDIOGRAM TRACING: CPT | Performed by: PEDIATRICS

## 2021-01-01 PROCEDURE — 80053 COMPREHEN METABOLIC PANEL: CPT

## 2021-01-01 PROCEDURE — 6370000000 HC RX 637 (ALT 250 FOR IP): Performed by: FAMILY MEDICINE

## 2021-01-01 PROCEDURE — 6360000002 HC RX W HCPCS: Performed by: PEDIATRICS

## 2021-01-01 PROCEDURE — 1111F DSCHRG MED/CURRENT MED MERGE: CPT | Performed by: FAMILY MEDICINE

## 2021-01-01 PROCEDURE — 2580000003 HC RX 258: Performed by: INTERNAL MEDICINE

## 2021-01-01 PROCEDURE — 82803 BLOOD GASES ANY COMBINATION: CPT

## 2021-01-01 PROCEDURE — G8417 CALC BMI ABV UP PARAM F/U: HCPCS | Performed by: INTERNAL MEDICINE

## 2021-01-01 PROCEDURE — 99214 OFFICE O/P EST MOD 30 MIN: CPT | Performed by: FAMILY MEDICINE

## 2021-01-01 PROCEDURE — 93005 ELECTROCARDIOGRAM TRACING: CPT | Performed by: EMERGENCY MEDICINE

## 2021-01-01 PROCEDURE — 85027 COMPLETE CBC AUTOMATED: CPT

## 2021-01-01 PROCEDURE — 6370000000 HC RX 637 (ALT 250 FOR IP): Performed by: INTERNAL MEDICINE

## 2021-01-01 PROCEDURE — 83880 ASSAY OF NATRIURETIC PEPTIDE: CPT

## 2021-01-01 PROCEDURE — G8417 CALC BMI ABV UP PARAM F/U: HCPCS | Performed by: NURSE PRACTITIONER

## 2021-01-01 PROCEDURE — 84439 ASSAY OF FREE THYROXINE: CPT

## 2021-01-01 PROCEDURE — 83605 ASSAY OF LACTIC ACID: CPT

## 2021-01-01 PROCEDURE — 1036F TOBACCO NON-USER: CPT | Performed by: INTERNAL MEDICINE

## 2021-01-01 PROCEDURE — 94640 AIRWAY INHALATION TREATMENT: CPT

## 2021-01-01 PROCEDURE — 99214 OFFICE O/P EST MOD 30 MIN: CPT | Performed by: INTERNAL MEDICINE

## 2021-01-01 PROCEDURE — 90715 TDAP VACCINE 7 YRS/> IM: CPT | Performed by: FAMILY MEDICINE

## 2021-01-01 PROCEDURE — 94761 N-INVAS EAR/PLS OXIMETRY MLT: CPT

## 2021-01-01 PROCEDURE — 82947 ASSAY GLUCOSE BLOOD QUANT: CPT

## 2021-01-01 PROCEDURE — G8427 DOCREV CUR MEDS BY ELIG CLIN: HCPCS | Performed by: INTERNAL MEDICINE

## 2021-01-01 PROCEDURE — 3017F COLORECTAL CA SCREEN DOC REV: CPT | Performed by: INTERNAL MEDICINE

## 2021-01-01 PROCEDURE — 2580000003 HC RX 258: Performed by: FAMILY MEDICINE

## 2021-01-01 PROCEDURE — 80061 LIPID PANEL: CPT

## 2021-01-01 PROCEDURE — 99215 OFFICE O/P EST HI 40 MIN: CPT | Performed by: INTERNAL MEDICINE

## 2021-01-01 PROCEDURE — 1123F ACP DISCUSS/DSCN MKR DOCD: CPT | Performed by: INTERNAL MEDICINE

## 2021-01-01 PROCEDURE — G0439 PPPS, SUBSEQ VISIT: HCPCS | Performed by: FAMILY MEDICINE

## 2021-01-01 PROCEDURE — 71275 CT ANGIOGRAPHY CHEST: CPT

## 2021-01-01 PROCEDURE — 6360000002 HC RX W HCPCS: Performed by: EMERGENCY MEDICINE

## 2021-01-01 PROCEDURE — 84484 ASSAY OF TROPONIN QUANT: CPT

## 2021-01-01 PROCEDURE — 36415 COLL VENOUS BLD VENIPUNCTURE: CPT

## 2021-01-01 PROCEDURE — 85379 FIBRIN DEGRADATION QUANT: CPT

## 2021-01-01 PROCEDURE — 90471 IMMUNIZATION ADMIN: CPT | Performed by: FAMILY MEDICINE

## 2021-01-01 PROCEDURE — 84132 ASSAY OF SERUM POTASSIUM: CPT

## 2021-01-01 PROCEDURE — P9047 ALBUMIN (HUMAN), 25%, 50ML: HCPCS | Performed by: INTERNAL MEDICINE

## 2021-01-01 PROCEDURE — 99285 EMERGENCY DEPT VISIT HI MDM: CPT

## 2021-01-01 PROCEDURE — 2580000003 HC RX 258: Performed by: HOSPITALIST

## 2021-01-01 PROCEDURE — 84145 PROCALCITONIN (PCT): CPT

## 2021-01-01 PROCEDURE — 99291 CRITICAL CARE FIRST HOUR: CPT | Performed by: INTERNAL MEDICINE

## 2021-01-01 PROCEDURE — G0378 HOSPITAL OBSERVATION PER HR: HCPCS

## 2021-01-01 PROCEDURE — 6370000000 HC RX 637 (ALT 250 FOR IP)

## 2021-01-01 PROCEDURE — 0202U NFCT DS 22 TRGT SARS-COV-2: CPT

## 2021-01-01 PROCEDURE — 1036F TOBACCO NON-USER: CPT | Performed by: NURSE PRACTITIONER

## 2021-01-01 PROCEDURE — 2700000000 HC OXYGEN THERAPY PER DAY

## 2021-01-01 PROCEDURE — 1123F ACP DISCUSS/DSCN MKR DOCD: CPT | Performed by: NURSE PRACTITIONER

## 2021-01-01 PROCEDURE — 51702 INSERT TEMP BLADDER CATH: CPT

## 2021-01-01 PROCEDURE — 99213 OFFICE O/P EST LOW 20 MIN: CPT | Performed by: FAMILY MEDICINE

## 2021-01-01 PROCEDURE — 2000000000 HC ICU R&B

## 2021-01-01 PROCEDURE — 36600 WITHDRAWAL OF ARTERIAL BLOOD: CPT

## 2021-01-01 PROCEDURE — 99204 OFFICE O/P NEW MOD 45 MIN: CPT | Performed by: INTERNAL MEDICINE

## 2021-01-01 PROCEDURE — 3023F SPIROM DOC REV: CPT | Performed by: NURSE PRACTITIONER

## 2021-01-01 PROCEDURE — 94660 CPAP INITIATION&MGMT: CPT

## 2021-01-01 PROCEDURE — 6360000002 HC RX W HCPCS: Performed by: FAMILY MEDICINE

## 2021-01-01 PROCEDURE — G8484 FLU IMMUNIZE NO ADMIN: HCPCS | Performed by: INTERNAL MEDICINE

## 2021-01-01 PROCEDURE — 3023F SPIROM DOC REV: CPT | Performed by: INTERNAL MEDICINE

## 2021-01-01 PROCEDURE — 96374 THER/PROPH/DIAG INJ IV PUSH: CPT

## 2021-01-01 PROCEDURE — 71250 CT THORAX DX C-: CPT

## 2021-01-01 PROCEDURE — 99213 OFFICE O/P EST LOW 20 MIN: CPT | Performed by: UROLOGY

## 2021-01-01 PROCEDURE — 81003 URINALYSIS AUTO W/O SCOPE: CPT

## 2021-01-01 PROCEDURE — 84443 ASSAY THYROID STIM HORMONE: CPT

## 2021-01-01 PROCEDURE — 6370000000 HC RX 637 (ALT 250 FOR IP): Performed by: EMERGENCY MEDICINE

## 2021-01-01 PROCEDURE — 4040F PNEUMOC VAC/ADMIN/RCVD: CPT | Performed by: INTERNAL MEDICINE

## 2021-01-01 PROCEDURE — 84100 ASSAY OF PHOSPHORUS: CPT

## 2021-01-01 PROCEDURE — G8427 DOCREV CUR MEDS BY ELIG CLIN: HCPCS | Performed by: NURSE PRACTITIONER

## 2021-01-01 PROCEDURE — 83735 ASSAY OF MAGNESIUM: CPT

## 2021-01-01 PROCEDURE — C8929 TTE W OR WO FOL WCON,DOPPLER: HCPCS

## 2021-01-01 PROCEDURE — 83036 HEMOGLOBIN GLYCOSYLATED A1C: CPT

## 2021-01-01 PROCEDURE — 99283 EMERGENCY DEPT VISIT LOW MDM: CPT

## 2021-01-01 PROCEDURE — 1210000000 HC MED SURG R&B

## 2021-01-01 PROCEDURE — G8926 SPIRO NO PERF OR DOC: HCPCS | Performed by: INTERNAL MEDICINE

## 2021-01-01 PROCEDURE — G8926 SPIRO NO PERF OR DOC: HCPCS | Performed by: NURSE PRACTITIONER

## 2021-01-01 PROCEDURE — 87635 SARS-COV-2 COVID-19 AMP PRB: CPT

## 2021-01-01 PROCEDURE — 4040F PNEUMOC VAC/ADMIN/RCVD: CPT | Performed by: NURSE PRACTITIONER

## 2021-01-01 PROCEDURE — 6360000004 HC RX CONTRAST MEDICATION: Performed by: EMERGENCY MEDICINE

## 2021-01-01 PROCEDURE — 99214 OFFICE O/P EST MOD 30 MIN: CPT | Performed by: NURSE PRACTITIONER

## 2021-01-01 PROCEDURE — 1111F DSCHRG MED/CURRENT MED MERGE: CPT | Performed by: INTERNAL MEDICINE

## 2021-01-01 PROCEDURE — 5A09357 ASSISTANCE WITH RESPIRATORY VENTILATION, LESS THAN 24 CONSECUTIVE HOURS, CONTINUOUS POSITIVE AIRWAY PRESSURE: ICD-10-PCS | Performed by: INTERNAL MEDICINE

## 2021-01-01 PROCEDURE — 93005 ELECTROCARDIOGRAM TRACING: CPT | Performed by: HOSPITALIST

## 2021-01-01 PROCEDURE — 87641 MR-STAPH DNA AMP PROBE: CPT

## 2021-01-01 PROCEDURE — 81001 URINALYSIS AUTO W/SCOPE: CPT | Performed by: UROLOGY

## 2021-01-01 PROCEDURE — 87040 BLOOD CULTURE FOR BACTERIA: CPT

## 2021-01-01 RX ORDER — IPRATROPIUM BROMIDE AND ALBUTEROL SULFATE 2.5; .5 MG/3ML; MG/3ML
1 SOLUTION RESPIRATORY (INHALATION)
Status: DISCONTINUED | OUTPATIENT
Start: 2021-01-01 | End: 2021-01-01

## 2021-01-01 RX ORDER — METHYLPREDNISOLONE SODIUM SUCCINATE 125 MG/2ML
60 INJECTION, POWDER, LYOPHILIZED, FOR SOLUTION INTRAMUSCULAR; INTRAVENOUS EVERY 6 HOURS
Status: DISCONTINUED | OUTPATIENT
Start: 2021-01-01 | End: 2021-01-01 | Stop reason: HOSPADM

## 2021-01-01 RX ORDER — MORPHINE SULFATE 2 MG/ML
2 INJECTION, SOLUTION INTRAMUSCULAR; INTRAVENOUS EVERY 4 HOURS PRN
Status: DISCONTINUED | OUTPATIENT
Start: 2021-01-01 | End: 2021-01-01

## 2021-01-01 RX ORDER — SODIUM CHLORIDE 0.9 % (FLUSH) 0.9 %
10 SYRINGE (ML) INJECTION PRN
Status: DISCONTINUED | OUTPATIENT
Start: 2021-01-01 | End: 2021-01-01 | Stop reason: HOSPADM

## 2021-01-01 RX ORDER — SODIUM CHLORIDE 0.9 % (FLUSH) 0.9 %
5-40 SYRINGE (ML) INJECTION EVERY 12 HOURS SCHEDULED
Status: DISCONTINUED | OUTPATIENT
Start: 2021-01-01 | End: 2021-01-01 | Stop reason: HOSPADM

## 2021-01-01 RX ORDER — METOPROLOL TARTRATE 50 MG/1
100 TABLET, FILM COATED ORAL 2 TIMES DAILY
Status: DISCONTINUED | OUTPATIENT
Start: 2021-01-01 | End: 2021-01-01 | Stop reason: HOSPADM

## 2021-01-01 RX ORDER — PREDNISONE 10 MG/1
TABLET ORAL
Qty: 38 TABLET | Refills: 0 | Status: SHIPPED | OUTPATIENT
Start: 2021-01-01 | End: 2021-01-01

## 2021-01-01 RX ORDER — ASPIRIN 81 MG/1
81 TABLET, CHEWABLE ORAL DAILY
Status: DISCONTINUED | OUTPATIENT
Start: 2021-01-01 | End: 2021-01-01 | Stop reason: HOSPADM

## 2021-01-01 RX ORDER — NINTEDANIB 150 MG/1
150 CAPSULE ORAL 2 TIMES DAILY
Qty: 60 CAPSULE | Refills: 11
Start: 2021-01-01

## 2021-01-01 RX ORDER — ATORVASTATIN CALCIUM 40 MG/1
40 TABLET, FILM COATED ORAL NIGHTLY
Status: DISCONTINUED | OUTPATIENT
Start: 2021-01-01 | End: 2021-01-01 | Stop reason: HOSPADM

## 2021-01-01 RX ORDER — FINASTERIDE 5 MG/1
5 TABLET, FILM COATED ORAL DAILY
Status: DISCONTINUED | OUTPATIENT
Start: 2021-01-01 | End: 2021-01-01 | Stop reason: HOSPADM

## 2021-01-01 RX ORDER — POTASSIUM CHLORIDE 7.45 MG/ML
10 INJECTION INTRAVENOUS PRN
Status: DISCONTINUED | OUTPATIENT
Start: 2021-01-01 | End: 2021-01-01 | Stop reason: HOSPADM

## 2021-01-01 RX ORDER — AZITHROMYCIN 500 MG/1
500 TABLET, FILM COATED ORAL DAILY
Qty: 3 TABLET | Refills: 0 | Status: SHIPPED | OUTPATIENT
Start: 2021-01-01 | End: 2021-01-01

## 2021-01-01 RX ORDER — CLOPIDOGREL BISULFATE 75 MG/1
75 TABLET ORAL DAILY
Qty: 90 TABLET | Refills: 3 | Status: SHIPPED | OUTPATIENT
Start: 2021-01-01

## 2021-01-01 RX ORDER — METOPROLOL TARTRATE 100 MG/1
100 TABLET ORAL 2 TIMES DAILY
Qty: 14 TABLET | Refills: 1 | Status: SHIPPED | OUTPATIENT
Start: 2021-01-01

## 2021-01-01 RX ORDER — METOPROLOL TARTRATE 50 MG/1
50 TABLET, FILM COATED ORAL 2 TIMES DAILY
Qty: 180 TABLET | Refills: 3 | Status: ON HOLD | OUTPATIENT
Start: 2021-01-01 | End: 2021-01-01 | Stop reason: HOSPADM

## 2021-01-01 RX ORDER — ISOSORBIDE MONONITRATE 60 MG/1
60 TABLET, EXTENDED RELEASE ORAL DAILY
Status: DISCONTINUED | OUTPATIENT
Start: 2021-01-01 | End: 2021-01-01

## 2021-01-01 RX ORDER — METHYLPREDNISOLONE SODIUM SUCCINATE 125 MG/2ML
80 INJECTION, POWDER, LYOPHILIZED, FOR SOLUTION INTRAMUSCULAR; INTRAVENOUS EVERY 8 HOURS
Status: DISCONTINUED | OUTPATIENT
Start: 2021-01-01 | End: 2021-01-01

## 2021-01-01 RX ORDER — NITROGLYCERIN 0.4 MG/1
TABLET SUBLINGUAL
Qty: 25 TABLET | Refills: 0 | Status: SHIPPED | OUTPATIENT
Start: 2021-01-01

## 2021-01-01 RX ORDER — PREDNISONE 20 MG/1
40 TABLET ORAL DAILY
Status: DISCONTINUED | OUTPATIENT
Start: 2021-01-01 | End: 2021-01-01 | Stop reason: HOSPADM

## 2021-01-01 RX ORDER — ISOSORBIDE MONONITRATE 60 MG/1
60 TABLET, EXTENDED RELEASE ORAL 2 TIMES DAILY
Qty: 180 TABLET | Refills: 3 | Status: SHIPPED | OUTPATIENT
Start: 2021-01-01 | End: 2021-01-01

## 2021-01-01 RX ORDER — AZITHROMYCIN 250 MG/1
500 TABLET, FILM COATED ORAL DAILY
Status: DISCONTINUED | OUTPATIENT
Start: 2021-01-01 | End: 2021-01-01

## 2021-01-01 RX ORDER — DEXTROSE MONOHYDRATE 50 MG/ML
100 INJECTION, SOLUTION INTRAVENOUS PRN
Status: DISCONTINUED | OUTPATIENT
Start: 2021-01-01 | End: 2021-01-01 | Stop reason: HOSPADM

## 2021-01-01 RX ORDER — ONDANSETRON 2 MG/ML
4 INJECTION INTRAMUSCULAR; INTRAVENOUS EVERY 6 HOURS PRN
Status: DISCONTINUED | OUTPATIENT
Start: 2021-01-01 | End: 2021-01-01 | Stop reason: HOSPADM

## 2021-01-01 RX ORDER — CEFDINIR 300 MG/1
300 CAPSULE ORAL
COMMUNITY
Start: 2021-01-01 | End: 2021-01-01

## 2021-01-01 RX ORDER — IPRATROPIUM BROMIDE AND ALBUTEROL SULFATE 2.5; .5 MG/3ML; MG/3ML
1 SOLUTION RESPIRATORY (INHALATION) EVERY 4 HOURS
Status: DISCONTINUED | OUTPATIENT
Start: 2021-01-01 | End: 2021-01-01 | Stop reason: HOSPADM

## 2021-01-01 RX ORDER — ACETAMINOPHEN 325 MG/1
650 TABLET ORAL EVERY 6 HOURS PRN
Status: DISCONTINUED | OUTPATIENT
Start: 2021-01-01 | End: 2021-01-01 | Stop reason: HOSPADM

## 2021-01-01 RX ORDER — ATORVASTATIN CALCIUM 40 MG/1
TABLET, FILM COATED ORAL
Qty: 90 TABLET | Refills: 3 | Status: SHIPPED | OUTPATIENT
Start: 2021-01-01

## 2021-01-01 RX ORDER — SODIUM CHLORIDE 0.9 % (FLUSH) 0.9 %
5-40 SYRINGE (ML) INJECTION PRN
Status: DISCONTINUED | OUTPATIENT
Start: 2021-01-01 | End: 2021-01-01 | Stop reason: HOSPADM

## 2021-01-01 RX ORDER — ALBUTEROL SULFATE 2.5 MG/3ML
2.5 SOLUTION RESPIRATORY (INHALATION)
Status: DISCONTINUED | OUTPATIENT
Start: 2021-01-01 | End: 2021-01-01 | Stop reason: HOSPADM

## 2021-01-01 RX ORDER — ONDANSETRON 2 MG/ML
8 INJECTION INTRAMUSCULAR; INTRAVENOUS EVERY 6 HOURS PRN
Status: DISCONTINUED | OUTPATIENT
Start: 2021-01-01 | End: 2021-01-01 | Stop reason: HOSPADM

## 2021-01-01 RX ORDER — METOPROLOL TARTRATE 75 MG/1
75 TABLET, FILM COATED ORAL 2 TIMES DAILY
Qty: 14 TABLET | Refills: 1 | Status: SHIPPED
Start: 2021-01-01 | End: 2021-01-01 | Stop reason: DRUGHIGH

## 2021-01-01 RX ORDER — IPRATROPIUM BROMIDE AND ALBUTEROL SULFATE 2.5; .5 MG/3ML; MG/3ML
SOLUTION RESPIRATORY (INHALATION)
Status: COMPLETED
Start: 2021-01-01 | End: 2021-01-01

## 2021-01-01 RX ORDER — DEXTROSE MONOHYDRATE 25 G/50ML
12.5 INJECTION, SOLUTION INTRAVENOUS PRN
Status: DISCONTINUED | OUTPATIENT
Start: 2021-01-01 | End: 2021-01-01 | Stop reason: HOSPADM

## 2021-01-01 RX ORDER — ONDANSETRON 4 MG/1
4 TABLET, ORALLY DISINTEGRATING ORAL EVERY 8 HOURS PRN
Status: DISCONTINUED | OUTPATIENT
Start: 2021-01-01 | End: 2021-01-01 | Stop reason: HOSPADM

## 2021-01-01 RX ORDER — NICOTINE POLACRILEX 4 MG
15 LOZENGE BUCCAL PRN
Status: DISCONTINUED | OUTPATIENT
Start: 2021-01-01 | End: 2021-01-01 | Stop reason: HOSPADM

## 2021-01-01 RX ORDER — METOPROLOL TARTRATE 100 MG/1
100 TABLET ORAL 2 TIMES DAILY
COMMUNITY
End: 2021-01-01 | Stop reason: SDUPTHER

## 2021-01-01 RX ORDER — NITROGLYCERIN 0.4 MG/1
0.4 TABLET SUBLINGUAL EVERY 5 MIN PRN
Status: DISCONTINUED | OUTPATIENT
Start: 2021-01-01 | End: 2021-01-01 | Stop reason: HOSPADM

## 2021-01-01 RX ORDER — METHYLPREDNISOLONE SODIUM SUCCINATE 40 MG/ML
40 INJECTION, POWDER, LYOPHILIZED, FOR SOLUTION INTRAMUSCULAR; INTRAVENOUS EVERY 12 HOURS
Status: DISCONTINUED | OUTPATIENT
Start: 2021-01-01 | End: 2021-01-01 | Stop reason: HOSPADM

## 2021-01-01 RX ORDER — MORPHINE SULFATE 2 MG/ML
2 INJECTION, SOLUTION INTRAMUSCULAR; INTRAVENOUS
Status: DISCONTINUED | OUTPATIENT
Start: 2021-01-01 | End: 2021-01-01 | Stop reason: HOSPADM

## 2021-01-01 RX ORDER — SODIUM CHLORIDE 9 MG/ML
25 INJECTION, SOLUTION INTRAVENOUS PRN
Status: DISCONTINUED | OUTPATIENT
Start: 2021-01-01 | End: 2021-01-01 | Stop reason: HOSPADM

## 2021-01-01 RX ORDER — POLYETHYLENE GLYCOL 3350 17 G/17G
17 POWDER, FOR SOLUTION ORAL DAILY PRN
Status: DISCONTINUED | OUTPATIENT
Start: 2021-01-01 | End: 2021-01-01 | Stop reason: HOSPADM

## 2021-01-01 RX ORDER — PROMETHAZINE HYDROCHLORIDE 12.5 MG/1
12.5 TABLET ORAL EVERY 6 HOURS PRN
Status: DISCONTINUED | OUTPATIENT
Start: 2021-01-01 | End: 2021-01-01 | Stop reason: HOSPADM

## 2021-01-01 RX ORDER — ASPIRIN 81 MG/1
243 TABLET, CHEWABLE ORAL ONCE
Status: COMPLETED | OUTPATIENT
Start: 2021-01-01 | End: 2021-01-01

## 2021-01-01 RX ORDER — FAMOTIDINE 20 MG/1
20 TABLET, FILM COATED ORAL 2 TIMES DAILY
Status: DISCONTINUED | OUTPATIENT
Start: 2021-01-01 | End: 2021-01-01 | Stop reason: HOSPADM

## 2021-01-01 RX ORDER — AZITHROMYCIN 500 MG/1
TABLET, FILM COATED ORAL
COMMUNITY
Start: 2021-01-01

## 2021-01-01 RX ORDER — CLOPIDOGREL BISULFATE 75 MG/1
75 TABLET ORAL DAILY
Status: DISCONTINUED | OUTPATIENT
Start: 2021-01-01 | End: 2021-01-01 | Stop reason: HOSPADM

## 2021-01-01 RX ORDER — ARFORMOTEROL TARTRATE 15 UG/2ML
15 SOLUTION RESPIRATORY (INHALATION) 2 TIMES DAILY
Status: DISCONTINUED | OUTPATIENT
Start: 2021-01-01 | End: 2021-01-01 | Stop reason: HOSPADM

## 2021-01-01 RX ORDER — POTASSIUM CHLORIDE 20 MEQ/1
40 TABLET, EXTENDED RELEASE ORAL PRN
Status: DISCONTINUED | OUTPATIENT
Start: 2021-01-01 | End: 2021-01-01 | Stop reason: HOSPADM

## 2021-01-01 RX ORDER — BUDESONIDE 0.5 MG/2ML
0.5 INHALANT ORAL 2 TIMES DAILY
Status: DISCONTINUED | OUTPATIENT
Start: 2021-01-01 | End: 2021-01-01 | Stop reason: HOSPADM

## 2021-01-01 RX ORDER — ALBUTEROL SULFATE 90 UG/1
2 AEROSOL, METERED RESPIRATORY (INHALATION) EVERY 6 HOURS PRN
Qty: 8 G | Refills: 0 | Status: SHIPPED | OUTPATIENT
Start: 2021-01-01

## 2021-01-01 RX ORDER — 0.9 % SODIUM CHLORIDE 0.9 %
250 INTRAVENOUS SOLUTION INTRAVENOUS ONCE
Status: DISCONTINUED | OUTPATIENT
Start: 2021-01-01 | End: 2021-01-01 | Stop reason: HOSPADM

## 2021-01-01 RX ORDER — ACETAMINOPHEN 650 MG/1
650 SUPPOSITORY RECTAL EVERY 6 HOURS PRN
Status: DISCONTINUED | OUTPATIENT
Start: 2021-01-01 | End: 2021-01-01 | Stop reason: HOSPADM

## 2021-01-01 RX ORDER — ISOSORBIDE DINITRATE 40 MG/1
TABLET ORAL
COMMUNITY
Start: 2021-01-01 | End: 2021-01-01 | Stop reason: SDUPTHER

## 2021-01-01 RX ORDER — ISOSORBIDE MONONITRATE 60 MG/1
60 TABLET, EXTENDED RELEASE ORAL 2 TIMES DAILY
Qty: 60 TABLET | Refills: 5 | Status: SHIPPED | OUTPATIENT
Start: 2021-01-01 | End: 2021-01-01 | Stop reason: SDUPTHER

## 2021-01-01 RX ORDER — AZITHROMYCIN 250 MG/1
500 TABLET, FILM COATED ORAL DAILY
Status: DISCONTINUED | OUTPATIENT
Start: 2021-01-01 | End: 2021-01-01 | Stop reason: HOSPADM

## 2021-01-01 RX ORDER — ISOSORBIDE MONONITRATE 60 MG/1
60 TABLET, EXTENDED RELEASE ORAL 2 TIMES DAILY
Qty: 60 TABLET | Refills: 1 | Status: SHIPPED | OUTPATIENT
Start: 2021-01-01 | End: 2021-01-01 | Stop reason: SDUPTHER

## 2021-01-01 RX ORDER — FINASTERIDE 5 MG/1
5 TABLET, FILM COATED ORAL DAILY
Qty: 90 TABLET | Refills: 3 | Status: SHIPPED | OUTPATIENT
Start: 2021-01-01

## 2021-01-01 RX ORDER — METOPROLOL TARTRATE 75 MG/1
75 TABLET, FILM COATED ORAL 2 TIMES DAILY
Qty: 180 TABLET | Refills: 3 | Status: SHIPPED | OUTPATIENT
Start: 2021-01-01 | End: 2021-01-01 | Stop reason: SDUPTHER

## 2021-01-01 RX ORDER — METOPROLOL TARTRATE 100 MG/1
100 TABLET ORAL
COMMUNITY
Start: 2021-01-01

## 2021-01-01 RX ORDER — METOPROLOL TARTRATE 50 MG/1
50 TABLET, FILM COATED ORAL 2 TIMES DAILY
Status: DISCONTINUED | OUTPATIENT
Start: 2021-01-01 | End: 2021-01-01

## 2021-01-01 RX ORDER — LORAZEPAM 2 MG/ML
2 INJECTION INTRAMUSCULAR
Status: DISCONTINUED | OUTPATIENT
Start: 2021-01-01 | End: 2021-01-01 | Stop reason: HOSPADM

## 2021-01-01 RX ORDER — ISOSORBIDE MONONITRATE 60 MG/1
60 TABLET, EXTENDED RELEASE ORAL 2 TIMES DAILY
Status: DISCONTINUED | OUTPATIENT
Start: 2021-01-01 | End: 2021-01-01 | Stop reason: HOSPADM

## 2021-01-01 RX ORDER — CEFDINIR 300 MG/1
300 CAPSULE ORAL 2 TIMES DAILY
Qty: 16 CAPSULE | Refills: 0 | Status: SHIPPED | OUTPATIENT
Start: 2021-01-01 | End: 2021-01-01

## 2021-01-01 RX ORDER — ISOSORBIDE MONONITRATE 30 MG/1
30 TABLET, EXTENDED RELEASE ORAL DAILY
Qty: 90 TABLET | Refills: 3 | Status: SHIPPED | OUTPATIENT
Start: 2021-01-01 | End: 2021-01-01

## 2021-01-01 RX ORDER — METOPROLOL TARTRATE 75 MG/1
75 TABLET, FILM COATED ORAL 2 TIMES DAILY
Qty: 60 TABLET | Refills: 0 | Status: SHIPPED | OUTPATIENT
Start: 2021-01-01 | End: 2021-01-01 | Stop reason: SDUPTHER

## 2021-01-01 RX ORDER — ALBUMIN (HUMAN) 12.5 G/50ML
25 SOLUTION INTRAVENOUS ONCE
Status: COMPLETED | OUTPATIENT
Start: 2021-01-01 | End: 2021-01-01

## 2021-01-01 RX ORDER — METOPROLOL TARTRATE 50 MG/1
50 TABLET, FILM COATED ORAL 2 TIMES DAILY
COMMUNITY
Start: 2021-01-01 | End: 2021-01-01 | Stop reason: SDUPTHER

## 2021-01-01 RX ORDER — ISOSORBIDE MONONITRATE 30 MG/1
60 TABLET, EXTENDED RELEASE ORAL 2 TIMES DAILY
Status: DISCONTINUED | OUTPATIENT
Start: 2021-01-01 | End: 2021-01-01 | Stop reason: HOSPADM

## 2021-01-01 RX ORDER — METHYLPREDNISOLONE SODIUM SUCCINATE 125 MG/2ML
125 INJECTION, POWDER, LYOPHILIZED, FOR SOLUTION INTRAMUSCULAR; INTRAVENOUS ONCE
Status: COMPLETED | OUTPATIENT
Start: 2021-01-01 | End: 2021-01-01

## 2021-01-01 RX ORDER — MAGNESIUM SULFATE 1 G/100ML
1000 INJECTION INTRAVENOUS PRN
Status: DISCONTINUED | OUTPATIENT
Start: 2021-01-01 | End: 2021-01-01 | Stop reason: HOSPADM

## 2021-01-01 RX ORDER — ISOSORBIDE MONONITRATE 60 MG/1
60 TABLET, EXTENDED RELEASE ORAL DAILY
COMMUNITY

## 2021-01-01 RX ORDER — BUDESONIDE AND FORMOTEROL FUMARATE DIHYDRATE 160; 4.5 UG/1; UG/1
2 AEROSOL RESPIRATORY (INHALATION) 2 TIMES DAILY
Status: DISCONTINUED | OUTPATIENT
Start: 2021-01-01 | End: 2021-01-01

## 2021-01-01 RX ORDER — IPRATROPIUM BROMIDE AND ALBUTEROL SULFATE 2.5; .5 MG/3ML; MG/3ML
1 SOLUTION RESPIRATORY (INHALATION) ONCE
Status: COMPLETED | OUTPATIENT
Start: 2021-01-01 | End: 2021-01-01

## 2021-01-01 RX ORDER — SODIUM CHLORIDE 0.9 % (FLUSH) 0.9 %
10 SYRINGE (ML) INJECTION EVERY 12 HOURS SCHEDULED
Status: DISCONTINUED | OUTPATIENT
Start: 2021-01-01 | End: 2021-01-01 | Stop reason: HOSPADM

## 2021-01-01 RX ORDER — FUROSEMIDE 10 MG/ML
20 INJECTION INTRAMUSCULAR; INTRAVENOUS ONCE
Status: COMPLETED | OUTPATIENT
Start: 2021-01-01 | End: 2021-01-01

## 2021-01-01 RX ADMIN — BUDESONIDE 500 MCG: 0.5 SUSPENSION RESPIRATORY (INHALATION) at 18:23

## 2021-01-01 RX ADMIN — FINASTERIDE 5 MG: 5 TABLET, FILM COATED ORAL at 09:04

## 2021-01-01 RX ADMIN — SODIUM CHLORIDE, PRESERVATIVE FREE 2000 MG: 5 INJECTION INTRAVENOUS at 00:58

## 2021-01-01 RX ADMIN — SODIUM CHLORIDE, PRESERVATIVE FREE 2000 MG: 5 INJECTION INTRAVENOUS at 16:12

## 2021-01-01 RX ADMIN — ASPIRIN 81 MG 243 MG: 81 TABLET ORAL at 10:44

## 2021-01-01 RX ADMIN — METHYLPREDNISOLONE SODIUM SUCCINATE 80 MG: 125 INJECTION, POWDER, FOR SOLUTION INTRAMUSCULAR; INTRAVENOUS at 09:04

## 2021-01-01 RX ADMIN — FAMOTIDINE 20 MG: 20 TABLET, FILM COATED ORAL at 19:48

## 2021-01-01 RX ADMIN — ENOXAPARIN SODIUM 40 MG: 40 INJECTION SUBCUTANEOUS at 08:47

## 2021-01-01 RX ADMIN — FAMOTIDINE 20 MG: 10 INJECTION, SOLUTION INTRAVENOUS at 08:35

## 2021-01-01 RX ADMIN — METOPROLOL TARTRATE 75 MG: 50 TABLET, FILM COATED ORAL at 09:45

## 2021-01-01 RX ADMIN — IPRATROPIUM BROMIDE AND ALBUTEROL SULFATE 1 AMPULE: .5; 2.5 SOLUTION RESPIRATORY (INHALATION) at 06:33

## 2021-01-01 RX ADMIN — IPRATROPIUM BROMIDE AND ALBUTEROL SULFATE 1 AMPULE: 2.5; .5 SOLUTION RESPIRATORY (INHALATION) at 08:45

## 2021-01-01 RX ADMIN — FINASTERIDE 5 MG: 5 TABLET, FILM COATED ORAL at 08:48

## 2021-01-01 RX ADMIN — METOPROLOL TARTRATE 100 MG: 50 TABLET, FILM COATED ORAL at 22:07

## 2021-01-01 RX ADMIN — MORPHINE SULFATE 2 MG: 2 INJECTION, SOLUTION INTRAMUSCULAR; INTRAVENOUS at 02:54

## 2021-01-01 RX ADMIN — SODIUM CHLORIDE, PRESERVATIVE FREE 10 ML: 5 INJECTION INTRAVENOUS at 09:45

## 2021-01-01 RX ADMIN — ISOSORBIDE MONONITRATE 60 MG: 30 TABLET, EXTENDED RELEASE ORAL at 22:11

## 2021-01-01 RX ADMIN — FAMOTIDINE 20 MG: 10 INJECTION, SOLUTION INTRAVENOUS at 09:04

## 2021-01-01 RX ADMIN — MORPHINE SULFATE 2 MG: 2 INJECTION, SOLUTION INTRAMUSCULAR; INTRAVENOUS at 21:43

## 2021-01-01 RX ADMIN — IPRATROPIUM BROMIDE AND ALBUTEROL SULFATE 1 AMPULE: .5; 2.5 SOLUTION RESPIRATORY (INHALATION) at 10:13

## 2021-01-01 RX ADMIN — METHYLPREDNISOLONE SODIUM SUCCINATE 60 MG: 125 INJECTION, POWDER, FOR SOLUTION INTRAMUSCULAR; INTRAVENOUS at 14:45

## 2021-01-01 RX ADMIN — ATORVASTATIN CALCIUM 40 MG: 40 TABLET, FILM COATED ORAL at 22:07

## 2021-01-01 RX ADMIN — ENOXAPARIN SODIUM 40 MG: 40 INJECTION SUBCUTANEOUS at 09:05

## 2021-01-01 RX ADMIN — SODIUM CHLORIDE, PRESERVATIVE FREE 1000 MG: 5 INJECTION INTRAVENOUS at 15:22

## 2021-01-01 RX ADMIN — DOXYCYCLINE 100 MG: 100 INJECTION, POWDER, LYOPHILIZED, FOR SOLUTION INTRAVENOUS at 16:12

## 2021-01-01 RX ADMIN — BUDESONIDE 500 MCG: 0.5 SUSPENSION RESPIRATORY (INHALATION) at 06:37

## 2021-01-01 RX ADMIN — IPRATROPIUM BROMIDE AND ALBUTEROL SULFATE 1 AMPULE: .5; 2.5 SOLUTION RESPIRATORY (INHALATION) at 18:31

## 2021-01-01 RX ADMIN — ASPIRIN 81 MG: 81 TABLET, CHEWABLE ORAL at 09:04

## 2021-01-01 RX ADMIN — ARFORMOTEROL TARTRATE 15 MCG: 15 SOLUTION RESPIRATORY (INHALATION) at 06:37

## 2021-01-01 RX ADMIN — MORPHINE SULFATE 2 MG: 2 INJECTION, SOLUTION INTRAMUSCULAR; INTRAVENOUS at 08:08

## 2021-01-01 RX ADMIN — ASPIRIN 81 MG: 81 TABLET, CHEWABLE ORAL at 08:29

## 2021-01-01 RX ADMIN — FAMOTIDINE 20 MG: 20 TABLET, FILM COATED ORAL at 09:45

## 2021-01-01 RX ADMIN — IPRATROPIUM BROMIDE AND ALBUTEROL SULFATE 1 AMPULE: .5; 2.5 SOLUTION RESPIRATORY (INHALATION) at 10:34

## 2021-01-01 RX ADMIN — FAMOTIDINE 20 MG: 20 TABLET, FILM COATED ORAL at 15:13

## 2021-01-01 RX ADMIN — METOPROLOL TARTRATE 75 MG: 50 TABLET, FILM COATED ORAL at 20:57

## 2021-01-01 RX ADMIN — METHYLPREDNISOLONE SODIUM SUCCINATE 60 MG: 125 INJECTION, POWDER, FOR SOLUTION INTRAMUSCULAR; INTRAVENOUS at 07:45

## 2021-01-01 RX ADMIN — ATORVASTATIN CALCIUM 40 MG: 40 TABLET, FILM COATED ORAL at 21:44

## 2021-01-01 RX ADMIN — ISOSORBIDE MONONITRATE 60 MG: 30 TABLET, EXTENDED RELEASE ORAL at 20:53

## 2021-01-01 RX ADMIN — SODIUM CHLORIDE, PRESERVATIVE FREE 10 ML: 5 INJECTION INTRAVENOUS at 20:53

## 2021-01-01 RX ADMIN — METHYLPREDNISOLONE SODIUM SUCCINATE 40 MG: 40 INJECTION, POWDER, FOR SOLUTION INTRAMUSCULAR; INTRAVENOUS at 20:53

## 2021-01-01 RX ADMIN — METHYLPREDNISOLONE SODIUM SUCCINATE 60 MG: 125 INJECTION, POWDER, FOR SOLUTION INTRAMUSCULAR; INTRAVENOUS at 21:43

## 2021-01-01 RX ADMIN — METOPROLOL TARTRATE 100 MG: 50 TABLET, FILM COATED ORAL at 07:46

## 2021-01-01 RX ADMIN — IPRATROPIUM BROMIDE AND ALBUTEROL SULFATE 1 AMPULE: .5; 2.5 SOLUTION RESPIRATORY (INHALATION) at 02:15

## 2021-01-01 RX ADMIN — ISOSORBIDE MONONITRATE 60 MG: 60 TABLET, EXTENDED RELEASE ORAL at 09:04

## 2021-01-01 RX ADMIN — ARFORMOTEROL TARTRATE 15 MCG: 15 SOLUTION RESPIRATORY (INHALATION) at 18:23

## 2021-01-01 RX ADMIN — ISOSORBIDE MONONITRATE 60 MG: 30 TABLET, EXTENDED RELEASE ORAL at 08:47

## 2021-01-01 RX ADMIN — FAMOTIDINE 20 MG: 20 TABLET, FILM COATED ORAL at 20:57

## 2021-01-01 RX ADMIN — CLOPIDOGREL BISULFATE 75 MG: 75 TABLET ORAL at 08:47

## 2021-01-01 RX ADMIN — AZITHROMYCIN MONOHYDRATE 500 MG: 250 TABLET ORAL at 14:30

## 2021-01-01 RX ADMIN — ASPIRIN 81 MG: 81 TABLET, CHEWABLE ORAL at 08:48

## 2021-01-01 RX ADMIN — ARFORMOTEROL TARTRATE 15 MCG: 15 SOLUTION RESPIRATORY (INHALATION) at 06:40

## 2021-01-01 RX ADMIN — CLOPIDOGREL BISULFATE 75 MG: 75 TABLET ORAL at 08:28

## 2021-01-01 RX ADMIN — METHYLPREDNISOLONE SODIUM SUCCINATE 60 MG: 125 INJECTION, POWDER, FOR SOLUTION INTRAMUSCULAR; INTRAVENOUS at 03:00

## 2021-01-01 RX ADMIN — ATORVASTATIN CALCIUM 40 MG: 40 TABLET, FILM COATED ORAL at 20:57

## 2021-01-01 RX ADMIN — METHYLPREDNISOLONE SODIUM SUCCINATE 60 MG: 125 INJECTION, POWDER, FOR SOLUTION INTRAMUSCULAR; INTRAVENOUS at 04:30

## 2021-01-01 RX ADMIN — FAMOTIDINE 20 MG: 20 TABLET, FILM COATED ORAL at 09:04

## 2021-01-01 RX ADMIN — SODIUM CHLORIDE, PRESERVATIVE FREE 2000 MG: 5 INJECTION INTRAVENOUS at 07:45

## 2021-01-01 RX ADMIN — ENOXAPARIN SODIUM 40 MG: 40 INJECTION SUBCUTANEOUS at 09:39

## 2021-01-01 RX ADMIN — CLOPIDOGREL BISULFATE 75 MG: 75 TABLET ORAL at 09:04

## 2021-01-01 RX ADMIN — IPRATROPIUM BROMIDE AND ALBUTEROL SULFATE 1 AMPULE: .5; 2.5 SOLUTION RESPIRATORY (INHALATION) at 14:23

## 2021-01-01 RX ADMIN — DOXYCYCLINE 100 MG: 100 INJECTION, POWDER, LYOPHILIZED, FOR SOLUTION INTRAVENOUS at 14:12

## 2021-01-01 RX ADMIN — IPRATROPIUM BROMIDE AND ALBUTEROL SULFATE 1 AMPULE: .5; 2.5 SOLUTION RESPIRATORY (INHALATION) at 22:11

## 2021-01-01 RX ADMIN — SODIUM CHLORIDE, PRESERVATIVE FREE 10 ML: 5 INJECTION INTRAVENOUS at 08:48

## 2021-01-01 RX ADMIN — FINASTERIDE 5 MG: 5 TABLET, FILM COATED ORAL at 09:45

## 2021-01-01 RX ADMIN — METHYLPREDNISOLONE SODIUM SUCCINATE 40 MG: 40 INJECTION, POWDER, FOR SOLUTION INTRAMUSCULAR; INTRAVENOUS at 08:48

## 2021-01-01 RX ADMIN — METHYLPREDNISOLONE SODIUM SUCCINATE 60 MG: 125 INJECTION, POWDER, FOR SOLUTION INTRAMUSCULAR; INTRAVENOUS at 14:13

## 2021-01-01 RX ADMIN — ASPIRIN 81 MG: 81 TABLET, CHEWABLE ORAL at 07:46

## 2021-01-01 RX ADMIN — FINASTERIDE 5 MG: 5 TABLET, FILM COATED ORAL at 08:28

## 2021-01-01 RX ADMIN — CLOPIDOGREL BISULFATE 75 MG: 75 TABLET ORAL at 07:46

## 2021-01-01 RX ADMIN — ARFORMOTEROL TARTRATE 15 MCG: 15 SOLUTION RESPIRATORY (INHALATION) at 10:47

## 2021-01-01 RX ADMIN — ISOSORBIDE MONONITRATE 60 MG: 30 TABLET, EXTENDED RELEASE ORAL at 08:28

## 2021-01-01 RX ADMIN — BUDESONIDE 500 MCG: 0.5 SUSPENSION RESPIRATORY (INHALATION) at 18:40

## 2021-01-01 RX ADMIN — IPRATROPIUM BROMIDE AND ALBUTEROL SULFATE 1 AMPULE: .5; 2.5 SOLUTION RESPIRATORY (INHALATION) at 10:30

## 2021-01-01 RX ADMIN — ATORVASTATIN CALCIUM 40 MG: 40 TABLET, FILM COATED ORAL at 20:53

## 2021-01-01 RX ADMIN — ASPIRIN 81 MG: 81 TABLET, CHEWABLE ORAL at 09:45

## 2021-01-01 RX ADMIN — FINASTERIDE 5 MG: 5 TABLET, FILM COATED ORAL at 07:46

## 2021-01-01 RX ADMIN — METOPROLOL TARTRATE 50 MG: 50 TABLET, FILM COATED ORAL at 19:49

## 2021-01-01 RX ADMIN — ARFORMOTEROL TARTRATE 15 MCG: 15 SOLUTION RESPIRATORY (INHALATION) at 18:40

## 2021-01-01 RX ADMIN — IPRATROPIUM BROMIDE AND ALBUTEROL SULFATE 1 AMPULE: .5; 3 SOLUTION RESPIRATORY (INHALATION) at 08:45

## 2021-01-01 RX ADMIN — METHYLPREDNISOLONE SODIUM SUCCINATE 60 MG: 125 INJECTION, POWDER, FOR SOLUTION INTRAMUSCULAR; INTRAVENOUS at 20:51

## 2021-01-01 RX ADMIN — BUDESONIDE 500 MCG: 0.5 SUSPENSION RESPIRATORY (INHALATION) at 06:40

## 2021-01-01 RX ADMIN — SODIUM CHLORIDE, PRESERVATIVE FREE 2000 MG: 5 INJECTION INTRAVENOUS at 16:25

## 2021-01-01 RX ADMIN — MORPHINE SULFATE 2 MG: 2 INJECTION, SOLUTION INTRAMUSCULAR; INTRAVENOUS at 09:11

## 2021-01-01 RX ADMIN — BUDESONIDE 500 MCG: 0.5 SUSPENSION RESPIRATORY (INHALATION) at 10:47

## 2021-01-01 RX ADMIN — SODIUM CHLORIDE, PRESERVATIVE FREE 10 ML: 5 INJECTION INTRAVENOUS at 19:49

## 2021-01-01 RX ADMIN — METHYLPREDNISOLONE SODIUM SUCCINATE 125 MG: 125 INJECTION, POWDER, FOR SOLUTION INTRAMUSCULAR; INTRAVENOUS at 09:38

## 2021-01-01 RX ADMIN — METOPROLOL TARTRATE 100 MG: 50 TABLET, FILM COATED ORAL at 08:47

## 2021-01-01 RX ADMIN — FAMOTIDINE 20 MG: 10 INJECTION, SOLUTION INTRAVENOUS at 21:43

## 2021-01-01 RX ADMIN — SODIUM CHLORIDE, PRESERVATIVE FREE 1000 MG: 5 INJECTION INTRAVENOUS at 14:30

## 2021-01-01 RX ADMIN — VANCOMYCIN HYDROCHLORIDE 2250 MG: 1 INJECTION, POWDER, LYOPHILIZED, FOR SOLUTION INTRAVENOUS at 10:23

## 2021-01-01 RX ADMIN — METOPROLOL TARTRATE 75 MG: 50 TABLET, FILM COATED ORAL at 09:04

## 2021-01-01 RX ADMIN — IPRATROPIUM BROMIDE AND ALBUTEROL SULFATE 1 AMPULE: .5; 2.5 SOLUTION RESPIRATORY (INHALATION) at 14:40

## 2021-01-01 RX ADMIN — DOXYCYCLINE 100 MG: 100 INJECTION, POWDER, LYOPHILIZED, FOR SOLUTION INTRAVENOUS at 02:21

## 2021-01-01 RX ADMIN — FAMOTIDINE 20 MG: 10 INJECTION, SOLUTION INTRAVENOUS at 20:51

## 2021-01-01 RX ADMIN — IOPAMIDOL 90 ML: 755 INJECTION, SOLUTION INTRAVENOUS at 02:45

## 2021-01-01 RX ADMIN — CLOPIDOGREL BISULFATE 75 MG: 75 TABLET ORAL at 09:45

## 2021-01-01 RX ADMIN — METOPROLOL TARTRATE 100 MG: 50 TABLET, FILM COATED ORAL at 08:28

## 2021-01-01 RX ADMIN — FAMOTIDINE 20 MG: 10 INJECTION, SOLUTION INTRAVENOUS at 07:45

## 2021-01-01 RX ADMIN — FUROSEMIDE 20 MG: 20 INJECTION, SOLUTION INTRAMUSCULAR; INTRAVENOUS at 09:04

## 2021-01-01 RX ADMIN — INSULIN LISPRO 3 UNITS: 100 INJECTION, SOLUTION INTRAVENOUS; SUBCUTANEOUS at 11:48

## 2021-01-01 RX ADMIN — ENOXAPARIN SODIUM 40 MG: 40 INJECTION SUBCUTANEOUS at 07:45

## 2021-01-01 RX ADMIN — SODIUM CHLORIDE, PRESERVATIVE FREE 2000 MG: 5 INJECTION INTRAVENOUS at 01:18

## 2021-01-01 RX ADMIN — AZITHROMYCIN MONOHYDRATE 500 MG: 250 TABLET ORAL at 08:47

## 2021-01-01 RX ADMIN — METHYLPREDNISOLONE SODIUM SUCCINATE 60 MG: 125 INJECTION, POWDER, FOR SOLUTION INTRAMUSCULAR; INTRAVENOUS at 08:35

## 2021-01-01 RX ADMIN — ALBUMIN (HUMAN) 25 G: 0.25 INJECTION, SOLUTION INTRAVENOUS at 09:05

## 2021-01-01 RX ADMIN — ATORVASTATIN CALCIUM 40 MG: 40 TABLET, FILM COATED ORAL at 19:48

## 2021-01-01 RX ADMIN — SODIUM CHLORIDE, PRESERVATIVE FREE 2000 MG: 5 INJECTION INTRAVENOUS at 09:04

## 2021-01-01 RX ADMIN — METHYLPREDNISOLONE SODIUM SUCCINATE 125 MG: 125 INJECTION, POWDER, FOR SOLUTION INTRAMUSCULAR; INTRAVENOUS at 05:33

## 2021-01-01 RX ADMIN — METOPROLOL TARTRATE 100 MG: 50 TABLET, FILM COATED ORAL at 22:58

## 2021-01-01 RX ADMIN — ISOSORBIDE MONONITRATE 60 MG: 60 TABLET, EXTENDED RELEASE ORAL at 09:45

## 2021-01-01 RX ADMIN — ATORVASTATIN CALCIUM 40 MG: 40 TABLET, FILM COATED ORAL at 20:51

## 2021-01-01 RX ADMIN — METHYLPREDNISOLONE SODIUM SUCCINATE 40 MG: 40 INJECTION, POWDER, FOR SOLUTION INTRAMUSCULAR; INTRAVENOUS at 08:29

## 2021-01-01 RX ADMIN — SODIUM CHLORIDE, PRESERVATIVE FREE 10 ML: 5 INJECTION INTRAVENOUS at 22:07

## 2021-01-01 RX ADMIN — SODIUM CHLORIDE, PRESERVATIVE FREE 10 ML: 5 INJECTION INTRAVENOUS at 21:00

## 2021-01-01 RX ADMIN — METOPROLOL TARTRATE 100 MG: 50 TABLET, FILM COATED ORAL at 21:44

## 2021-01-01 RX ADMIN — METOPROLOL TARTRATE 100 MG: 50 TABLET, FILM COATED ORAL at 20:53

## 2021-01-01 RX ADMIN — MORPHINE SULFATE 2 MG: 2 INJECTION, SOLUTION INTRAMUSCULAR; INTRAVENOUS at 10:00

## 2021-01-01 RX ADMIN — ENOXAPARIN SODIUM 40 MG: 40 INJECTION SUBCUTANEOUS at 08:28

## 2021-01-01 RX ADMIN — ISOSORBIDE MONONITRATE 60 MG: 60 TABLET, EXTENDED RELEASE ORAL at 22:16

## 2021-01-01 RX ADMIN — DOXYCYCLINE 100 MG: 100 INJECTION, POWDER, LYOPHILIZED, FOR SOLUTION INTRAVENOUS at 02:08

## 2021-01-01 RX ADMIN — SODIUM CHLORIDE, PRESERVATIVE FREE 10 ML: 5 INJECTION INTRAVENOUS at 09:04

## 2021-01-01 RX ADMIN — AZITHROMYCIN MONOHYDRATE 500 MG: 250 TABLET ORAL at 15:29

## 2021-01-01 RX ADMIN — IPRATROPIUM BROMIDE AND ALBUTEROL SULFATE 1 AMPULE: .5; 2.5 SOLUTION RESPIRATORY (INHALATION) at 18:11

## 2021-01-01 RX ADMIN — IPRATROPIUM BROMIDE AND ALBUTEROL SULFATE 1 AMPULE: .5; 2.5 SOLUTION RESPIRATORY (INHALATION) at 06:30

## 2021-01-01 ASSESSMENT — ENCOUNTER SYMPTOMS
VOMITING: 0
BACK PAIN: 0
GASTROINTESTINAL NEGATIVE: 1
WHEEZING: 0
SHORTNESS OF BREATH: 0
NAUSEA: 0
EYE PAIN: 0
SHORTNESS OF BREATH: 1
COUGH: 1
SHORTNESS OF BREATH: 1
ABDOMINAL PAIN: 0
ABDOMINAL PAIN: 0
DIARRHEA: 0
DIARRHEA: 0
EYES NEGATIVE: 1
COUGH: 1
NAUSEA: 0
ABDOMINAL PAIN: 0
DIARRHEA: 0
COUGH: 1
CHEST TIGHTNESS: 1
COLOR CHANGE: 0
SHORTNESS OF BREATH: 1
RESPIRATORY NEGATIVE: 1
SHORTNESS OF BREATH: 0
RHINORRHEA: 0
ABDOMINAL PAIN: 0
GASTROINTESTINAL NEGATIVE: 1
COUGH: 0
ANAL BLEEDING: 0
VOMITING: 0
APNEA: 0
CHEST TIGHTNESS: 0
TROUBLE SWALLOWING: 0
GASTROINTESTINAL NEGATIVE: 1
EYES NEGATIVE: 1
VOMITING: 0
WHEEZING: 1
VOMITING: 0
SHORTNESS OF BREATH: 1
COUGH: 1
BACK PAIN: 0
WHEEZING: 0
RHINORRHEA: 0
ALLERGIC/IMMUNOLOGIC NEGATIVE: 1
ABDOMINAL DISTENTION: 0
RHINORRHEA: 0
WHEEZING: 0
STRIDOR: 1
SORE THROAT: 0
CHEST TIGHTNESS: 0
SORE THROAT: 0
APNEA: 0
BACK PAIN: 0
CHEST TIGHTNESS: 1
RESPIRATORY NEGATIVE: 1
SHORTNESS OF BREATH: 1
NAUSEA: 1
EYES NEGATIVE: 1
NAUSEA: 0
EYE DISCHARGE: 0
WHEEZING: 1
ANAL BLEEDING: 0
CHEST TIGHTNESS: 0
SHORTNESS OF BREATH: 1
ABDOMINAL DISTENTION: 0

## 2021-01-01 ASSESSMENT — PAIN DESCRIPTION - DESCRIPTORS
DESCRIPTORS: SORE
DESCRIPTORS: ACHING
DESCRIPTORS: SHARP;TIGHTNESS
DESCRIPTORS: TIGHTNESS
DESCRIPTORS: ACHING;SORE
DESCRIPTORS: ACHING;SORE

## 2021-01-01 ASSESSMENT — PAIN SCALES - GENERAL
PAINLEVEL_OUTOF10: 0
PAINLEVEL_OUTOF10: 3
PAINLEVEL_OUTOF10: 0
PAINLEVEL_OUTOF10: 5
PAINLEVEL_OUTOF10: 0
PAINLEVEL_OUTOF10: 6
PAINLEVEL_OUTOF10: 0
PAINLEVEL_OUTOF10: 0
PAINLEVEL_OUTOF10: 1
PAINLEVEL_OUTOF10: 0
PAINLEVEL_OUTOF10: 5
PAINLEVEL_OUTOF10: 5
PAINLEVEL_OUTOF10: 0

## 2021-01-01 ASSESSMENT — PAIN DESCRIPTION - ORIENTATION
ORIENTATION: RIGHT
ORIENTATION: MID
ORIENTATION: MID

## 2021-01-01 ASSESSMENT — PAIN DESCRIPTION - LOCATION
LOCATION: GENERALIZED
LOCATION: CHEST
LOCATION: CHEST;BACK;GENERALIZED
LOCATION: CHEST
LOCATION: CHEST
LOCATION: GENERALIZED

## 2021-01-01 ASSESSMENT — PAIN DESCRIPTION - PAIN TYPE
TYPE: ACUTE PAIN;CHRONIC PAIN

## 2021-03-04 PROBLEM — I25.10 CAD (CORONARY ARTERY DISEASE): Status: ACTIVE | Noted: 2021-01-01

## 2021-03-04 PROBLEM — R93.1 ABNORMAL FINDINGS ON CARDIAC CATHETERIZATION: Status: ACTIVE | Noted: 2021-01-01

## 2021-03-04 PROBLEM — R07.9 CHEST PAIN WITH HIGH RISK FOR CARDIAC ETIOLOGY: Status: ACTIVE | Noted: 2021-01-01

## 2021-03-04 NOTE — PLAN OF CARE
Problem: Skin Integrity:  Goal: Will show no infection signs and symptoms  Description: Will show no infection signs and symptoms  Outcome: Ongoing  Goal: Absence of new skin breakdown  Description: Absence of new skin breakdown  Outcome: Ongoing     Problem: Gas Exchange - Impaired:  Goal: Levels of oxygenation will improve  Description: Levels of oxygenation will improve  Outcome: Ongoing     Problem:  Activity:  Goal: Ability to tolerate increased activity will improve  Description: Ability to tolerate increased activity will improve  Outcome: Ongoing     Problem: Falls - Risk of:  Goal: Will remain free from falls  Description: Will remain free from falls  Outcome: Ongoing  Goal: Absence of physical injury  Description: Absence of physical injury  Outcome: Ongoing

## 2021-03-04 NOTE — PROGRESS NOTES
Pt received to  from ER. Jose Pacheco, RN to bs. Pt ambulated to BR to urinate-apparent dyspnea on exertion. O2 sat 80% on return to bed, placed pt on 2lpm/nc O2, sat improved to 93%. Pt is pleasant, alert and oriented, denies chest pain. Says he had indigestion this am and belches frequently. Also states he received his first of two COVID vaccines on 2/24/21. VSS at this time. Telemetry monitor placed--SR 70. Will continue to monitor.

## 2021-03-04 NOTE — PROGRESS NOTES
Spoke with Lashawn Wiley RN with Cardiology regarding pt's admission. Okay for cardiac diet per Dr. Irais Hidalgo.

## 2021-03-04 NOTE — CARE COORDINATION
Date / Time of Evaluation: 3/4/2021 12:12 PM  Assessment Completed by: Adolfo Syed    Patient Admission Status: Observation [104]      Current PCP:  Hannah Rodriguez MD  PCP verified? yes    Initial Assessment Completed at bedside with:  yes    Emergency Contacts:  Extended Emergency Contact Information  Primary Emergency Contact: eliazar teran  Home Phone: [phone]  Mobile Phone: [phone]  Relation: Spouse   needed? No    Advance Directives: Code Status:  Prior    Financial:  Payor: MEDICARE / Plan: MEDICARE PART A AND B / Product Type: *No Product type* /     Pre-Cert required for SNF:  no  Have Long Term Care Insurance:  no    Pharmacy:   [facility] [address]  Phone: [phone] Fax: [phone]      Potential assistance purchasing medications? no    ADLS:  Support System:   Able to do everything on his own. At this time, he gets so out of breath he needs assistance    Current Home Environment:  Lives with spouse  Steps:  na    Plans to RETURN to current housing: yes  Barriers to RETURNING to current housing:  no    Currently ACTIVE with Home Health CARE:  no  [address]:  no    DME Provider:  no    Has a pulse oximetry unit at home: no    Had [address] [facility] prior to admission:  no  [address]:  no  Informed of need to bring portable home O2 tank to hospital on day of DISCHARGE:  no  Name of person committed to bringing portable tank at discharge:  no    Active with HD/PD prior to admission: no  Nephrologist:  no  HD Center:  no    Transition Plan:   return home to spouse    Transportation PLAN for Discharge:  Wife     Factors facilitating achievement of predicted outcomes: none    Barriers to discharge:  none      Additional CM/SW Notes: Pt was a very friendly man. Stated he is  to his [place]. Pt stated she helps him, when needed.  Pt stated he is able to do everything alone, but now needs some help due to shortness of breath. Denied any needs. May need o2 at dc. Bessie Mackey and/or his family were provided with choice of provider.         Angela   Care Management Department  Ph:  [phone]   Fax: [phone]

## 2021-03-04 NOTE — ED PROVIDER NOTES
[facility] EMERGENCY DEPT  eMERGENCY dEPARTMENT eNCOUnter      Pt Name: nAgie Ibrahim  MRN: [id number]  [place] 1945  Date of evaluation: 3/4/2021  Provider: José Luis Lopez MD    [address] [place] [address]       Chief Complaint   Patient presents with    Shortness of Breath         HISTORY OF PRESENT ILLNESS   (Location/Symptom, Timing/Onset,Context/Setting, Quality, Duration, Modifying Factors, Severity)  Note limiting factors. Angie Ibrahim is a 76 y.o. male who presents to the emergency department due to dyspnea. Patient said that for several months has had dyspnea on exertion. Said he was seen number by Dr. Lino Soliman underwent heart cath and shown to have coronary disease but he said Dr. Lino Soliman did not feel that stent was necessary at that time and felt that medical treatment without stenting would be appropriate. Patient feels like over the past couple of weeks his symptoms gotten worse. He said any activity at all makes him extremely short of breath. This is progressively worsened over the past couple of weeks. No cough or fever. No shortness of breath at rest but any activity makes him very winded. Has some mild discomfort in his chest when this happens as well. No unilateral leg swelling or pain. No history of DVT or PE.    HPI    NursingNotes were reviewed. REVIEW OF SYSTEMS    (2-9 systems for level 4, 10 or more for level 5)     Review of Systems   Constitutional: Negative for fever. Eyes: Negative for pain. Respiratory: Positive for shortness of breath. Cardiovascular: Positive for chest pain. Negative for palpitations and leg swelling. Gastrointestinal: Negative for abdominal pain, diarrhea and vomiting. Genitourinary: Negative for dysuria. Skin: Negative for rash. Neurological: Negative for weakness and headaches. All other systems reviewed and are negative. A complete review of systems was performed and is negative except as noted above in the HPI.        PAST MEDICAL HISTORY     Past Medical History:   Diagnosis Date    Arthritis     Chronic kidney disease     enlarged prostate    Hypertension          SURGICAL HISTORY       Past Surgical History:   Procedure Laterality Date    CHOLECYSTECTOMY      COLONOSCOPY      ENDOSCOPY, COLON, DIAGNOSTIC      HERNIA REPAIR      JOINT REPLACEMENT Bilateral     SKIN BIOPSY           CURRENT MEDICATIONS       Previous Medications    ASPIRIN 81 MG CHEWABLE TABLET    Take 1 tablet by mouth daily    ATORVASTATIN (LIPITOR) 40 MG TABLET    Take 1 tablet by mouth nightly    CLOPIDOGREL (PLAVIX) 75 MG TABLET    Take 1 tablet by mouth daily    FINASTERIDE (PROSCAR) 5 MG TABLET    Take 5 mg by mouth daily    ISOSORBIDE MONONITRATE (IMDUR) 30 MG EXTENDED RELEASE TABLET    Take 1 tablet by mouth daily    ISOSORBIDE MONONITRATE (IMDUR) 60 MG EXTENDED RELEASE TABLET    Take 1 tablet by mouth daily    METOPROLOL TARTRATE (LOPRESSOR) 25 MG TABLET    Take 0.5 tablets by mouth 2 times daily    METOPROLOL TARTRATE (LOPRESSOR) 50 MG TABLET    Take 1 tablet by mouth 2 times daily       ALLERGIES     Patient has no known allergies.     FAMILY HISTORY       Family History   Problem Relation Age of Onset    Cancer Mother     Heart Attack Father     Heart Disease Father     High Blood Pressure Father     Arthritis Father     Cancer Brother     Substance Abuse Brother     Arthritis Brother     High Blood Pressure Brother     No Known Problems Maternal Aunt     Heart Disease Maternal Uncle     High Blood Pressure Maternal Uncle     Coronary Art Dis Maternal Uncle     No Known Problems Paternal Aunt     No Known Problems Paternal Uncle     Heart Attack Brother     Heart Disease Brother     Coronary Art Dis Brother     High Blood Pressure Brother           SOCIAL HISTORY       Social History     Socioeconomic History    Marital status:      Spouse name: Roldan Samson Number of children: 3    Years of education: 15    Highest education level: None   Occupational History    None   Social Needs    Financial resource strain: None    Food insecurity     Worry: None     Inability: None    Transportation needs     Medical: None     Non-medical: None   Tobacco Use    Smoking status: Former Smoker     Packs/day: 1.50     Years: 22.00     Pack years: 33.00     Types: Cigarettes    Smokeless tobacco: Former User     Types: Chew   Substance and Sexual Activity    Alcohol use: Never     Frequency: Never    Drug use: Never    Sexual activity: Not Currently     Partners: Female   Lifestyle    Physical activity     Days per week: None     Minutes per session: None    Stress: None   Relationships    Social connections     Talks on phone: None     Gets together: None     Attends Christianity service: None     Active member of club or organization: None     Attends meetings of clubs or organizations: None     Relationship status: None    Intimate partner violence     Fear of current or ex partner: None     Emotionally abused: None     Physically abused: None     Forced sexual activity: None   Other Topics Concern    None   Social History Narrative    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[employer]     48 years only marriage    He has 1 daughter and 2 sons    Education high school 1 year college    Previously [employer] active duty 3 years   1967 he has gone to the [employer] for healthcare in the past    He enjoys shooting sports    Smoked until age 43 packs/day denies alcohol consumption or substance usage       SCREENINGS    Ilia Coma Scale  Eye Opening: Spontaneous  Best Verbal Response: Oriented  Best Motor Response: Obeys commands  [place] Coma Scale Score: 15        PHYSICAL EXAM    (up to 7 for level 4, 8 or more for level 5)     ED Triage Vitals [03/04/21 0941]   BP Temp Temp Source Pulse Resp SpO2 Height Weight   (!) 142/90 97.7 °F (36.5 °C) Oral 70 21 92 % 6' 1.5\" (1.867 m) 196 lb (88.9 kg) Physical Exam  Vitals signs reviewed. Constitutional:       General: He is not in acute distress. Appearance: He is well-developed. HENT:      Head: Normocephalic and atraumatic. Eyes:      General: No scleral icterus. Pupils: Pupils are equal, round, and reactive to light. Neck:      Musculoskeletal: Normal range of motion and neck supple. Vascular: No JVD. Cardiovascular:      Rate and Rhythm: Normal rate and regular rhythm. Pulses: Normal pulses. Heart sounds: Normal heart sounds. No murmur. Pulmonary:      Effort: Pulmonary effort is normal. No respiratory distress. Breath sounds: Normal breath sounds. Abdominal:      General: There is no distension. Palpations: Abdomen is soft. Tenderness: There is no abdominal tenderness. There is no guarding or rebound. Musculoskeletal:         General: No tenderness. Right lower leg: No edema. Left lower leg: No edema. Skin:     General: Skin is warm and dry. Capillary Refill: Capillary refill takes less than 2 seconds. Neurological:      General: No focal deficit present. Mental Status: He is alert and oriented to person, place, and time. Psychiatric:         Mood and Affect: Mood normal.         Behavior: Behavior normal.         DIAGNOSTIC RESULTS     EKG: All EKG's are interpreted by the Emergency Department Physician who either signs or Co-signs this chart in the absence of a cardiologist.    Sinus rhythm. Normal QT. Do not see signs of acute ischemia. RADIOLOGY:   Non-plain film images such as CT, Ultrasound and MRI are read by the radiologist. Marcelino Canada images are visualized and preliminarily interpreted by the emergency physician with the below findings:    Interpretation per the Radiologist below, if available at the time of this note:    XR CHEST PORTABLE   Final Result   No active cardiopulmonary disease. Chronic inflammatory lung changes.    A persistent moderate widening of the mediastinum towards right may   represent prominent brachiocephalic vessels or uncoiling of the   atheromatous aortic arch. Signed by Dr Sheeba Allan on 3/4/2021 10:54 AM            LABS:  Labs Reviewed   CBC - Abnormal; Notable for the following components:       Result Value    MCHC 32.8 (*)     All other components within normal limits   COMPREHENSIVE METABOLIC PANEL W/ REFLEX TO MG FOR LOW K - Abnormal; Notable for the following components:    Glucose 118 (*)     All other components within normal limits   COVID-19, RAPID   D-DIMER, QUANTITATIVE   TROPONIN       All other labs were within normal range or not returned as of this dictation. EMERGENCY DEPARTMENT COURSE and DIFFERENTIALDIAGNOSIS/MDM:   Vitals:    Vitals:    03/04/21 1035 03/04/21 1104 03/04/21 1105 03/04/21 1134   BP:  122/85  118/76   Pulse: 60 65 64 54   Resp: 13 16 14 17   Temp:       TempSrc:       SpO2: 95% 90% 92% 93%   Weight:       Height:           MDM  Patient resting comfortably no distress. Patient's case discussed with hospitalist, Dr. Ryland Jeffers, is agreeable plan of care admission. CONSULTS:  IP CONSULT TO CARDIOLOGY    PROCEDURES:  Unless otherwise notedbelow, none     Procedures    FINAL IMPRESSION     1. Exertional dyspnea    2. Chest pain, unspecified type    3.  Abnormal chest x-ray          DISPOSITION/PLAN   DISPOSITION Decision To Admit 03/04/2021 11:54:07 AM      PATIENT REFERRED TO:  @FUP@    DISCHARGE MEDICATIONS:  New Prescriptions    No medications on file          (Please note that portions of this note were completed with a voice recognition program.  Efforts were made to edit the dictations butoccasionally words are mis-transcribed.)    Edmond Valero MD (electronically signed)  AttendingEmergency Physician          Edmond Valero MD  03/04/21 7107

## 2021-03-04 NOTE — ED NOTES
Pt exhibits moderate dyspnea on exertion; after walking from waiting room to patient room, O2 84% on room air; Pt educated on purse lipped breathing. After resting for 2-3 minutes. o2 increased to 89-90% on room air.       Mary Harrison RN  03/04/21 9334

## 2021-03-04 NOTE — H&P
[place] [address]    DEPARTMENT OF HOSPITALIST MEDICINE      HISTORY & PHYSICAL:          REASON FOR ADMISSION:  Chief Complaint   Patient presents with    Shortness of Breath        HISTORY OF PRESENT ILLNESS:  Benjie Irvin is an 76 y.o. male. He is a very pleasant gentleman who has a history of mild to moderate CAD diagnosed on a cardiac cath from 4 months ago as under:        He was started on aspirin, Plavix, beta-blocker and statin as well as Imdur, which he is currently taking on a regular basis. He came back to the ER today complaining of worsening dyspnea on exertion over the past few months which has gotten worse to the point that he cannot complete his daily activities without getting shortness of breath. He has some mild discomfort on the chest but no conclusive chest pain. He has no association with any palpitations or sweating. Given his previous history of CAD, he is being admitted to cardiology unit for cardiopulmonary monitoring, cardiology consult, further work-up and possible stent placement.       PAST MEDICAL HISTORY:  Past Medical History:   Diagnosis Date    Arthritis     CAD (coronary artery disease) 3/4/2021    Chronic kidney disease     enlarged prostate    Hypertension          PAST SURGICAL HISTORY:  Past Surgical History:   Procedure Laterality Date    CHOLECYSTECTOMY      COLONOSCOPY      ENDOSCOPY, COLON, DIAGNOSTIC      HERNIA REPAIR      JOINT REPLACEMENT Bilateral     SKIN BIOPSY          SOCIAL HISTORY:  Social History     Socioeconomic History    Marital status:      Spouse name: Román Maddox Number of children: 3    Years of education: 15    Highest education level: None   Occupational History    None   Social Needs    Financial resource strain: None    Food insecurity     Worry: None     Inability: None    Transportation needs     Medical: None     Non-medical: None   Tobacco Use    Smoking status: Former Smoker     Packs/day: 1.50     Years: 22.00     Pack years: 33.00     Types: Cigarettes    Smokeless tobacco: Former User     Types: Chew   Substance and Sexual Activity    Alcohol use: Never     Frequency: Never    Drug use: Never    Sexual activity: Not Currently     Partners: Female   Lifestyle    Physical activity     Days per week: None     Minutes per session: None    Stress: None   Relationships    Social connections     Talks on phone: None     Gets together: None     Attends Restorationism service: None     Active member of club or organization: None     Attends meetings of clubs or organizations: None     Relationship status: None    Intimate partner violence     Fear of current or ex partner: None     Emotionally abused: None     Physically abused: None     Forced sexual activity: None   Other Topics Concern    None   Social History Narrative    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[employer]     48 years only marriage    He has 1 daughter and 2 sons    Education high school 1 year college    Previously [employer] active duty 3 years   1967 he has gone to the [employer] for healthcare in the past    He enjoys shooting sports    Smoked until age 43 packs/day denies alcohol consumption or substance usage        FAMILY HISTORY:  Family History   Problem Relation Age of Onset    Cancer Mother     Heart Attack Father     Heart Disease Father     High Blood Pressure Father     Arthritis Father     Cancer Brother     Substance Abuse Brother     Arthritis Brother     High Blood Pressure Brother     No Known Problems Maternal Aunt     Heart Disease Maternal Uncle     High Blood Pressure Maternal Uncle     Coronary Art Dis Maternal Uncle     No Known Problems Paternal Aunt     No Known Problems Paternal Uncle     Heart Attack Brother     Heart Disease Brother     Coronary Art Dis Brother     High Blood Pressure Brother          ALLERGIES:  No Known Allergies sensory system intact   Psychiatric: Normal mood, non-suicidal         LABORATORY DATA:    CBC:  Recent Labs     03/04/21  0957   WBC 10.8   HGB 14.5   HCT 44.2        BMP:  Recent Labs     03/04/21 0957      K 4.7      CO2 25   BUN 16   CREATININE 1.0   CALCIUM 9.6     Recent Labs     03/04/21  0957   AST 15   ALT 12   BILITOT 0.5   ALKPHOS 68     Coag Panel: No results for input(s): INR, PROTIME, APTT in the last 72 hours. Cardiac Enzymes:   Recent Labs     03/04/21 0957   TROPONINI <0.01     ABGs:No results found for: PHART, PO2ART, UKP3ENR  Urinalysis:No results found for: NITRU, WBCUA, BACTERIA, RBCUA, BLOODU, SPECGRAV, GLUCOSEU  A1C: No results for input(s): LABA1C in the last 72 hours. ABG:No results for input(s): PHART, UEG7MCC, PO2ART, HYA4TSU, BEART, HGBAE, C3OGELRL, CARBOXHGBART in the last 72 hours. EKG:   Please see chart      IMAGING:  Xr Chest Portable    Result Date: 3/4/2021  No active cardiopulmonary disease. Chronic inflammatory lung changes. A persistent moderate widening of the mediastinum towards right may represent prominent brachiocephalic vessels or uncoiling of the atheromatous aortic arch. Signed by Dr Sheeba Allan on 3/4/2021 10:54 AM        Assessment and Plan:    Principal Problem:    Chest pain with high risk for cardiac etiology  Active Problems:    CAD (coronary artery disease)    Abnormal findings on cardiac catheterization    Arthritis    Hypertension  Resolved Problems:    * No resolved hospital problems.  *       Admit patient to medical unit under observation status  Continuous cardiac tele-monitoring  Patient continues to take aspirin, Plavix, beta-blocker, statin and Imdur on a regular basis  Monitor cardiac enzymes ×3  Last echo from November 2020 reviewed with findings still with mild to moderate CAD  Keep patient NPO except meds  Cardiology consultation in am for evaluation, further treatment recommendations, work up and possible cardiac cath with stent placement      Chronic medical issues . .. Continue with home meds. Monitor patient closely while admitted. Advised very close f/u with patient's PCP as an outpatient to address chronic medical issues. Repeat labs in a.m. Electrolyte replacement as per protocol. Patient will be monitored very closely on the floor. Further recommendations as per the hospital course. Patient's management will be taken over by our [facility] Hospitalist Team in am.    Patient  is on DVT prophylaxis  Current medications reviewed  Lab work reviewed  Radiology/Chest x-ray films reviewed  Discussed with the nurse and addressed all questions/concerns  Discussed with Patient and/or Family at the bedside in detail . .. they verbalize understanding and agree with the management plan. Attestation:  A minimum of two midnights of inpatient hospital care is anticipated to be required based on the patient's clinical condition which requires intensive medical therapy. At this time the patient is not clinically optimized for safe discharge. Kleber Rodríguez MD  12:22 PM 3/4/2021      DISCLAIMER: This note was created with electronic voice recognition which does have occasional errors. If you have any questions regarding the content within the note please do not hesitate to contact me. .. Thanks.

## 2021-03-05 NOTE — CONSULTS
[id number] [facility]  Cardiology Consult      Requesting MD:  Yamilet Oh MD   Admit Status:  Observation [104]       History obtained from:   [] Patient  [] Other (specify):     Patient:  Jory Mix  [id number]     Chief Complaint:   Chief Complaint   Patient presents with    Shortness of Breath       HPI: Mr. Kay Bermeo is a 76 y.o. male with a history of coronary artery disease diffuse multivessel by angiogram 11/13/2020 medical therapy recommended. Now comes in with increasing dyspnea denies exertional chest discomfort denies medical noncompliance. All troponins are negative. Had a pulmonary assessment last month at [facility] I do not have those test results at the present time will try and locate that. Previous smoker not recently. proBNP level 298. Covid-19 was negative. D-dimer assay negative. All troponins negative. Review of Systems:  Review of Systems   Constitutional: Negative. Negative for chills, fever and unexpected weight change. HENT: Negative. Eyes: Negative. Respiratory: Negative. Negative for shortness of breath. Cardiovascular: Negative. Negative for chest pain. Gastrointestinal: Negative. Negative for diarrhea, nausea and vomiting. Endocrine: Negative. Genitourinary: Negative. Musculoskeletal: Negative. Skin: Negative. Neurological: Negative. All other systems reviewed and are negative.       Cardiac Specific Data:  Specialty Problems        Cardiology Problems    NSTEMI (non-ST elevated myocardial infarction) [facility])        CAD (coronary artery disease)        * (Principal) Chest pain with high risk for cardiac etiology        Hypertension              Past Medical History:  Past Medical History:   Diagnosis Date    Arthritis     CAD (coronary artery disease) 3/4/2021    Chronic kidney disease     enlarged prostate    Hypertension         Past Surgical History:  Past Surgical History:   Procedure Laterality Date    CHOLECYSTECTOMY      COLONOSCOPY      ENDOSCOPY, COLON, DIAGNOSTIC      HERNIA REPAIR      JOINT REPLACEMENT Bilateral     SKIN BIOPSY         Past Family History:  Family History   Problem Relation Age of Onset    Cancer Mother     Heart Attack Father     Heart Disease Father     High Blood Pressure Father     Arthritis Father     Cancer Brother     Substance Abuse Brother     Arthritis Brother     High Blood Pressure Brother     No Known Problems Maternal Aunt     Heart Disease Maternal Uncle     High Blood Pressure Maternal Uncle     Coronary Art Dis Maternal Uncle     No Known Problems Paternal Aunt     No Known Problems Paternal Uncle     Heart Attack Brother     Heart Disease Brother     Coronary Art Dis Brother     High Blood Pressure Brother        Past Social History:  Social History     Socioeconomic History    Marital status:      Spouse name: Waldemar Clifton Number of children: 1    Years of education: 15    Highest education level: Not on file   Occupational History    Not on file   Social Needs    Financial resource strain: Not on file    Food insecurity     Worry: Not on file     Inability: Not on file   [employer] needs     Medical: Not on file     Non-medical: Not on file   Tobacco Use    Smoking status: Former Smoker     Packs/day: 1.50     Years: 22.00     Pack years: 33.00     Types: Cigarettes    Smokeless tobacco: Former User     Types: Chew   Substance and Sexual Activity    Alcohol use: Never     Frequency: Never    Drug use: Never    Sexual activity: Not Currently     Partners: Female   Lifestyle    Physical activity     Days per week: Not on file     Minutes per session: Not on file    Stress: Not on file   Relationships    Social connections     Talks on phone: Not on file     Gets together: Not on file     Attends Mosque service: Not on file     Active member of club or organization: Not on file     Attends meetings of clubs or organizations: Not on file     Relationship status: Not on file    Intimate partner violence     Fear of current or ex partner: Not on file     Emotionally abused: Not on file     Physically abused: Not on file     Forced sexual activity: Not on file   Other Topics Concern    Not on file   Social History Narrative    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[employer]     48 years only marriage    He has 1 daughter and 2 sons    Education high school 1 year college    Previously [employer] active duty 3 years   1967 he has gone to the [employer] for healthcare in the past    He enjoys shooting sports    Smoked until age 43 packs/day denies alcohol consumption or substance usage       Allergies:  No Known Allergies    Home Meds:  Prior to Admission medications    Medication Sig Start Date End Date Taking?  Authorizing Provider   metoprolol tartrate (LOPRESSOR) 50 MG tablet Take 1 tablet by mouth 2 times daily 2/23/21  Yes OLIVIA Heart   isosorbide mononitrate (IMDUR) 60 MG extended release tablet Take 1 tablet by mouth daily 12/14/20  Yes Yogi Acuna MD   aspirin 81 MG chewable tablet Take 1 tablet by mouth daily 11/15/20  Yes Jesika Goode MD   atorvastatin (LIPITOR) 40 MG tablet Take 1 tablet by mouth nightly 11/14/20  Yes Jesika Goode MD   clopidogrel (PLAVIX) 75 MG tablet Take 1 tablet by mouth daily 11/15/20  Yes Jesika Goode MD   finasteride (PROSCAR) 5 MG tablet Take 5 mg by mouth daily   Yes Historical Provider, MD       Current Meds:   isosorbide mononitrate  60 mg Oral BID    metoprolol tartrate  75 mg Oral BID    finasteride  5 mg Oral Daily    aspirin  81 mg Oral Daily    atorvastatin  40 mg Oral Nightly    clopidogrel  75 mg Oral Daily    sodium chloride flush  10 mL Intravenous 2 times per day    famotidine  20 mg Oral BID       Current Infused Meds:      Physical Exam:  Vitals:    03/05/21 0628   BP: (!) 145/84   Pulse: 70   Resp: 18   Temp: 97.2 °F (36.2 °C)   SpO2: 92%       Intake/Output Summary (Last 24 hours) at 3/5/2021 0951  Last data filed at 3/5/2021 0440  Gross per 24 hour   Intake 130 ml   Output --   Net 130 ml     Estimated body mass index is 25.35 kg/m² as calculated from the following:    Height as of this encounter: 6' 1.5\" (1.867 m). Weight as of this encounter: 194 lb 12.8 oz (88.4 kg). Physical Exam  Vitals signs reviewed. Constitutional:       General: He is not in acute distress. Appearance: Normal appearance. He is well-developed and normal weight. He is not ill-appearing, toxic-appearing or diaphoretic. HENT:      Head: Normocephalic and atraumatic. Nose: Nose normal.      Mouth/Throat:      Mouth: Mucous membranes are moist.      Pharynx: Oropharynx is clear. Eyes:      General: No scleral icterus. Extraocular Movements: Extraocular movements intact. Pupils: Pupils are equal, round, and reactive to light. Neck:      Musculoskeletal: Normal range of motion and neck supple. No neck rigidity or muscular tenderness. Vascular: No carotid bruit or JVD. Cardiovascular:      Rate and Rhythm: Normal rate and regular rhythm. Heart sounds: Normal heart sounds. No murmur. No friction rub. No gallop. Pulmonary:      Effort: Pulmonary effort is normal. No respiratory distress. Breath sounds: Normal breath sounds. No stridor. No wheezing, rhonchi or rales. Chest:      Chest wall: No tenderness. Abdominal:      General: Abdomen is flat. Bowel sounds are normal. There is no distension. Palpations: Abdomen is soft. There is no mass. Tenderness: There is no abdominal tenderness. There is no right CVA tenderness, left CVA tenderness, guarding or rebound. Hernia: No hernia is present. Musculoskeletal:         General: No swelling, tenderness, deformity or signs of injury. Right lower leg: No edema. Left lower leg: No edema.    Lymphadenopathy: Cervical: No cervical adenopathy. Skin:     General: Skin is warm and dry. Neurological:      General: No focal deficit present. Mental Status: He is alert. Mental status is at baseline. Cranial Nerves: No cranial nerve deficit. Sensory: No sensory deficit. Motor: No weakness. Coordination: Coordination normal.   Psychiatric:         Mood and Affect: Mood normal.         Behavior: Behavior normal.         Thought Content: Thought content normal.         Judgment: Judgment normal.         Labs:  Recent Labs     03/04/21  0957   WBC 10.8   HGB 14.5          Recent Labs     03/04/21  0957      K 4.7      CO2 25   BUN 16   CREATININE 1.0   LABGLOM >60   CALCIUM 9.6       CK, CKMB, Troponin: @LABRCNT (CKTOTAL:3, CKMB:3, TROPONINI:3)@    Last 3 BNP:  No results for input(s): BNP in the last 72 hours. IMAGING:  Xr Chest Portable    Result Date: 3/4/2021  Examination. XR CHEST PORTABLE 3/4/2021 9:24 AM History: Chest pain. A single, frontal, portable, upright view of the chest is compared with the previous study dated 11/13/2020. The lungs are poorly inflated. Chronic appearing interstitial changes in the lungs bilaterally, more pronounced in the mid lung zone are stable. There is no pleural effusion, pulmonary congestion or pneumothorax. The heart size is not evaluated due to the portable projection. Moderate widening of the mediastinum towards the right is unchanged in the previous study. There is no acute bony abnormality. No active cardiopulmonary disease. Chronic inflammatory lung changes. A persistent moderate widening of the mediastinum towards right may represent prominent brachiocephalic vessels or uncoiling of the atheromatous aortic arch. Signed by Dr Stephanie Begum on 3/4/2021 10:54 AM      Assessment:  1. Complaints of worsening shortness of breath no reported chest pain all troponins negative  2. Coronary artery disease  3.  Prior tobacco abuse  4. Hypertension  5. Abnormal chest x-ray chronic inflammatory lung changes  6. Cardiac cath 11/13/2020 normal left ventricular systolic function 82% mid LAD 90% diffuse disease small diagonal diffuse disease circumflex ramus and OM      Recommendations:  1. Increase Imdur to twice daily dosing  2. Increase Toprol-XL to 75 mg p.o. twice daily  3. Obtain copies of recent pulmonary assessment and PFT testing last month at [facility] for review  4.  If stable can be potentially discharged over the weekend Dr. Jane Up covering has an appointment with me on 3/16.  5. Will review previous cath films adult child(brigid)

## 2021-03-05 NOTE — PLAN OF CARE
Problem: Skin Integrity:  Goal: Will show no infection signs and symptoms  Description: Will show no infection signs and symptoms  3/4/2021 2324 by Rinku Adams RN  Outcome: Ongoing  3/4/2021 1450 by Zain Landis RN  Outcome: Ongoing  Goal: Absence of new skin breakdown  Description: Absence of new skin breakdown  3/4/2021 2324 by Rinku Adams RN  Outcome: Ongoing  3/4/2021 1450 by Zain Landis RN  Outcome: Ongoing     Problem: Gas Exchange - Impaired:  Goal: Levels of oxygenation will improve  Description: Levels of oxygenation will improve  3/4/2021 2324 by Rinku Adams RN  Outcome: Ongoing  3/4/2021 1450 by Zain Landis RN  Outcome: Ongoing     Problem:  Activity:  Goal: Ability to tolerate increased activity will improve  Description: Ability to tolerate increased activity will improve  3/4/2021 2324 by Rinku Adams RN  Outcome: Ongoing  3/4/2021 1450 by Zain Landis RN  Outcome: Ongoing     Problem: Falls - Risk of:  Goal: Will remain free from falls  Description: Will remain free from falls  3/4/2021 2324 by Rinku Adams RN  Outcome: Ongoing  3/4/2021 1450 by Zain Landis RN  Outcome: Ongoing  Goal: Absence of physical injury  Description: Absence of physical injury  3/4/2021 2324 by Rinku Adams RN  Outcome: Ongoing  3/4/2021 1450 by Zain Landis RN  Outcome: Ongoing

## 2021-03-06 PROBLEM — R07.9 CHEST PAIN WITH HIGH RISK FOR CARDIAC ETIOLOGY: Status: RESOLVED | Noted: 2021-01-01 | Resolved: 2021-01-01

## 2021-03-06 NOTE — DISCHARGE SUMMARY
[facility], [address] [place] [address] MEDICINE      DISCHARGE SUMMARY:      PATIENT NAME:  Sonido Milian  :  1945  MRN:  [id number]    Admission Date:   3/4/2021  9:37 AM Attending: Michael Saleh MD   Discharge Date:   3/6/2021              PCP: Jayy George MD  Length of Stay: 0 days     Chief Complaint on Admission:   Chief Complaint   Patient presents with    Shortness of Breath       Consultants:     IP CONSULT TO CARDIOLOGY       Discharge Problem List:   Principal Problem:    CAD (coronary artery disease)  Active Problems:    Abnormal findings on cardiac catheterization    Arthritis    Hypertension  Resolved Problems:    Chest pain with high risk for cardiac etiology      CUMULATIVE  HOSPITAL  COURSE  AND  TREATMENT:    3/6/2021  Patient has been chest pain free with adjustment of his cardiac medications. He has been seen and evaluated by covering cardiology and cleared for discharge. He is advised to follow-up closely with cardiology as an outpatient. Please see the recommendations from his cardiologist from yesterday as below:      Dr. Bell/cardiology recommendations . .. 3/5/2021:      3/5/2021  Patient is feeling better. Chest pain has improved. Cardiology has made changes to his medications and will monitor him closely. Cardiac enzymes have been negative.     3/4/2021  HISTORY OF PRESENT ILLNESS:  Ge Manriquez is an 76 y. o. male.  He is a very pleasant gentleman who has a history of mild to moderate CAD diagnosed on a cardiac cath from 4 months ago as under:    OBJECTIVE:  /64   Pulse 71   Temp 96.6 °F (35.9 °C) (Temporal)   Resp 16   Ht 6' 1.5\" (1.867 m)   Wt 195 lb 9.6 oz (88.7 kg)   SpO2 91%   BMI 25.46 kg/m²       Heart: RRR   Lungs: Bilateral fair air entry   Abdomen: Soft, non-tender   Extremities: No edema   Neurologic: Alert and oriented   Skin: Warm and dry          Laboratory Data:  Recent Labs     21  0957   WBC 10.8   HGB 14.5      Recent Labs     03/04/21  0957      K 4.7      CO2 25   BUN 16   CREATININE 1.0   GLUCOSE 118*     Recent Labs     03/04/21  0957   AST 15   ALT 12   BILITOT 0.5   ALKPHOS 68     Troponin T:   Recent Labs     03/04/21  0957 03/04/21  1549 03/04/21  2214   TROPONINI <0.01 <0.01 <0.01     Pro-BNP: No results for input(s): BNP in the last 72 hours. INR: No results for input(s): INR in the last 72 hours. UA:No results for input(s): NITRITE, COLORU, PHUR, LABCAST, WBCUA, RBCUA, MUCUS, TRICHOMONAS, YEAST, BACTERIA, CLARITYU, SPECGRAV, LEUKOCYTESUR, UROBILINOGEN, BILIRUBINUR, BLOODU, GLUCOSEU, AMORPHOUS in the last 72 hours. Invalid input(s): Perri Libman  A1C: No results for input(s): LABA1C in the last 72 hours. ABG:No results for input(s): PHART, XKS9ZOI, PO2ART, YFQ1KWV, BEART, HGBAE, U9NWOEHA, CARBOXHGBART in the last 72 hours. Impressions of imaging performed in 48 hours before discharge:    No results found. Ant Lindquist   Home Medication Instructions DIN:[id number]    Printed on:03/06/21 1232   Medication Information                      aspirin 81 MG chewable tablet  Take 1 tablet by mouth daily             atorvastatin (LIPITOR) 40 MG tablet  Take 1 tablet by mouth nightly             clopidogrel (PLAVIX) 75 MG tablet  Take 1 tablet by mouth daily             finasteride (PROSCAR) 5 MG tablet  Take 5 mg by mouth daily             isosorbide mononitrate (IMDUR) 60 MG extended release tablet  Take 1 tablet by mouth 2 times daily             metoprolol tartrate 75 MG TABS  Take 75 mg by mouth 2 times daily             nitroGLYCERIN (NITROSTAT) 0.4 MG SL tablet  up to max of 3 total doses. If no relief after 1 dose, call 911.                    ISSUES TO BE ADDRESSED AT HOSPITAL FOLLOW-UP VISIT:    Advised patient to follow-up closely with PCP upon discharge for management of chronic medical issues  Please see the detailed discharge directions delivered from earlier today! Condition on Discharge: completely resolved  Discharge Disposition: Home    Recommended Follow Up:  Dee Dee Lauren MD  [address] [phone]    On 3/18/2021  11:45 AM     Ebenezer Curry  [phone]    On 3/11/2021  Hospital follow-up appointment is scheduled for 3/11 at 2:30pm.    Followup Appointments Scheduled at Time of Discharge:  Future Appointments   Date Time Provider Rashaad Price   3/18/2021 11:45 AM Dee Dee Lauren MD [facility]        Discharge Instructions:   Please see the discharge paperwork. Patient was seen at bedside today, and the examination shows improvement since yesterday. Detailed discharge directions delivered to the patient by myself and our nursing staff, who verbalizes understanding and is very happy and satisfied with the plan. Patient has been advised to continue all medications as prescribed and advised, and f/u with PCP within 1 week. Patient is stable from medical standpoint to be discharged. Total time spent during patient evaluation and assessment, discussion with the nurse/family, addressing discharge medications/scripts and coordination of care for safe discharge was in excess of 35 minutes.       Signed Electronically:    Rao Morrison MD  12:32 PM 3/6/2021

## 2021-03-06 NOTE — PROGRESS NOTES
Cardiology Daily Note Mj Medina MD      Patient:  Skyler Goode  [id number]    Patient Active Problem List    Diagnosis Date Noted    Chest pain with high risk for cardiac etiology 03/04/2021     Priority: Low    CAD (coronary artery disease) 03/04/2021     Priority: Low    Abnormal findings on cardiac catheterization 03/04/2021     Priority: Low    Arthritis      Priority: Low    Hypertension      Priority: Low    NSTEMI (non-ST elevated myocardial infarction) ([facility] [address] 11/13/2020     Priority: Low       Admit Date:  3/4/2021    Admission Problem List: Present on Admission:   Chest pain with high risk for cardiac etiology   CAD (coronary artery disease)   Arthritis   Hypertension   Abnormal findings on cardiac catheterization      Cardiac Specific Data:  Specialty Problems        Cardiology Problems    NSTEMI (non-ST elevated myocardial infarction) (HCC)        CAD (coronary artery disease)        * (Principal) Chest pain with high risk for cardiac etiology        Hypertension              Subjective:  Mr. Gomez Rodriguez feels ok. wants to go home. No chest pain    Objective:   /64   Pulse 71   Temp 96.6 °F (35.9 °C) (Temporal)   Resp 16   Ht 6' 1.5\" (1.867 m)   Wt 194 lb 12.8 oz (88.4 kg)   SpO2 91%   BMI 25.35 kg/m²       Intake/Output Summary (Last 24 hours) at 3/6/2021 1126  Last data filed at 3/5/2021 1342  Gross per 24 hour   Intake 240 ml   Output --   Net 240 ml       Prior to Admission medications    Medication Sig Start Date End Date Taking?  Authorizing Provider   metoprolol tartrate (LOPRESSOR) 50 MG tablet Take 1 tablet by mouth 2 times daily 2/23/21  Yes OLIVIA Olivarez   isosorbide mononitrate (IMDUR) 60 MG extended release tablet Take 1 tablet by mouth daily 12/14/20  Yes Manan Degroot MD   aspirin 81 MG chewable tablet Take 1 tablet by mouth daily 11/15/20  Yes Ja Dee MD   atorvastatin (LIPITOR) 40 MG tablet Take 1 tablet by mouth nightly 11/14/20  Yes Uziel Rodriguez congestion or pneumothorax. The heart size is not evaluated due to the portable projection. Moderate widening of the mediastinum towards the right is unchanged in the previous study. There is no acute bony abnormality. No active cardiopulmonary disease. Chronic inflammatory lung changes. A persistent moderate widening of the mediastinum towards right may represent prominent brachiocephalic vessels or uncoiling of the atheromatous aortic arch. Signed by Dr Kobi Andres on 3/4/2021 10:54 AM        Assessment:  1. Complaints of worsening shortness of breath no reported chest pain all troponins negative  2. Coronary artery disease  3. Prior tobacco abuse  4. Hypertension  5. Abnormal chest x-ray chronic inflammatory lung changes  6. Cardiac cath 11/13/2020 normal left ventricular systolic function 23% mid LAD 90% diffuse disease small diagonal diffuse disease circumflex ramus and OM        Recommendations:  1. Increase Imdur to twice daily dosing  2. Increase Toprol-XL to 75 mg p.o. twice daily  3. Fu dr Peggy Cha on 3/16.     Reviewed old films  Aggressive medical mgmt      Dariana De La Rosa MD 3/6/2021 11:26 AM

## 2021-03-06 NOTE — PROGRESS NOTES
OK to discharge home and follow up with Dr Laxmi De Leon as outpatient, per Dr. Luis Felipe Cohen Electronically signed by Radha Mera RN on 3/6/21 at 11:12 AM CST

## 2021-03-08 NOTE — CARE COORDINATION
[facility] Transitions Initial Follow Up Call    Call within 2 business days of discharge: Yes    Patient: Daja Mcdaniel Patient : 1945   MRN: [id number]  Reason for Admission:   Discharge Date: 3/6/21 RARS: Readmission Risk Score: 10      Last Discharge [facility]       Complaint Diagnosis Description Type Department Provider    3/4/21 Shortness of Breath Exertional dyspnea . .. ED to Hosp-Admission (Discharged) (ADMITTED) [facility] PR[facility] Ender Moreau MD; Veronica Goff. .. Spoke with: N/A    Facility: [place] [id number]    Non-face-to-face services provided:  Chart review for Covid    Care Transitions 24 Hour Call    Care Transitions Interventions         Follow Up : Attempted to make contact with Dane Hernandes for COVID initial follow up call post discharge from the hospital without success, 2 x busy signal. Unable to leave a message regarding intent of call and call back information. Will try again my next business day.      Future Appointments   Date Time Provider Rashaad Price   3/18/2021 11:45 AM Shauna Sheth MD [facility]       Onel Golden, RN

## 2021-03-09 NOTE — CARE COORDINATION
[facility] Transitions Initial Follow Up Call    Call within 2 business days of discharge: Yes    Patient: Ceferino Almaguer Patient : 1945   MRN: [id number]  Reason for Admission:   Discharge Date: 3/6/21 RARS: Readmission Risk Score: 10      Last Discharge [facility]       Complaint Diagnosis Description Type Department Provider    3/4/21 Shortness of Breath Exertional dyspnea . .. ED to Hosp-Admission (Discharged) (ADMITTED) [facility] PROG Ender Onofre MD; Laine Hernandez. .. Spoke with: N/A    Facility: [place] [address]    Non-face-to-face services provided:  . Chart review for Covid    Care Transitions 24 Hour Call    Care Transitions Interventions         Follow Up : Attempt #2  to make contact with Lui Shaikh for Covid initial follow up call post discharge from the hospital without success. Unable to leave a message regarding intent of call and call back information. Will resolve calls due to inability to make contact with patient.     Future Appointments   Date Time Provider Rashaad Price   3/18/2021 11:45 AM Emily Walter MD [facility]       Manav Linton RN

## 2021-03-11 NOTE — PROGRESS NOTES
Transitional Care Follow Up Visit  Subjective     Elver Gordon is a 75 y.o. male who presents for a transitional care management visit.  Alone.    Within 48 business hours after discharge our office contacted him via telephone to coordinate his care and needs.      I reviewed and discussed the details of that call along with the discharge summary, hospital problems, inpatient lab results, inpatient diagnostic studies, and consultation reports with Elver.     Current outpatient and discharge medications have been reconciled for the patient.  3.4.21-3.6.21  Chest pain  Elevated bp  Hypertension  CAD    Risk for Readmission (LACE) No data recorded    History of Present Illness : started having some chest pain; went to ER to see if ok.     Course During Hospital Stay:  Chest pain improved with increase in bp Rx; bp was found slightly up.       The following portions of the patient's history were reviewed and updated as appropriate: allergies, current medications, past family history, past medical history, past social history, past surgical history and problem list.       Current Outpatient Medications:   •  aspirin 81 MG chewable tablet, Chew 81 mg Daily., Disp: , Rfl:   •  atorvastatin (LIPITOR) 40 MG tablet, Take 1 tablet by mouth Every Night., Disp: 90 tablet, Rfl: 1  •  clopidogrel (PLAVIX) 75 MG tablet, Take 1 tablet by mouth Daily., Disp: 90 tablet, Rfl: 3  •  finasteride (PROSCAR) 5 MG tablet, TAKE 1 TABLET BY MOUTH  DAILY, Disp: 90 tablet, Rfl: 3  •  isosorbide mononitrate (IMDUR) 60 MG 24 hr tablet, Take 60 mg by mouth Daily., Disp: , Rfl:   •  metoprolol tartrate (LOPRESSOR) 50 MG tablet, 50 mg 2 (Two) Times a Day., Disp: , Rfl:   •  acetaminophen (TYLENOL) 325 MG tablet, Take 650 mg by mouth Every 6 (Six) Hours As Needed for Mild Pain ., Disp: , Rfl:   •  albuterol sulfate HFA (ProAir HFA) 108 (90 Base) MCG/ACT inhaler, Inhale 2 puffs Every 6 (Six) Hours As Needed for Wheezing., Disp: 8 g, Rfl: 0  •  calcium  carbonate (TUMS) 500 MG chewable tablet, Chew 1 tablet Daily., Disp: , Rfl:       Review of Systems  GENERAL:  Active/slower with limits, speed, stamina for age and infrequent vertigo. Sleep is ok. No fever now/recent.  ENDO:  No syncope, near or diaphoretic sweaty spells.  HEENT: No head injury or headache,  No vision change,  Same mild tinnitis/hearing loss.  Ears without pain/drainage.  No sore throat.  No significant nasal/sinus congestion/drainage. No epistaxis.  CHEST: No chest wall tenderness or mass. No significant cough, without wheeze, hemoptysis; above SOB  CV: No chest pain, palpitations, ankle edema.  GI: No heartburn, dysphagia.  No abdominal pain, diarrhea, constipation, rectal bleeding, or melena.    :  Voids without dysuria, or incontinence to completion.  ORTHO: No painful/swollen joints but various on /off sore.  No sore neck or back.  No acute neck or back pain without recent injury.   NEURO: No dizziness, weakness of extremities.  No numbness/paresthesias.   PSYCH: No memory loss.  Mood good; not anxious, depressed but/and not suicidal.  Tolerated stress.      Screening:  Mammogram:NA  Bone density: NA  Low dose CT chest: Tobacco-smoker/age 18/1.5 ppd/dc 85 (22 py): NA  GI: Colon-polyp/Mc/4.2.2018/5y  Prostate: Zuniga/confirmed/7.17.19  Usual lab order  any labs urology/other MD wants  6m BMP, A1c  12m CBC, CMP, A1c,  LIPID, TSH, Vit D, PSAs    Results for orders placed or performed during the hospital encounter of 12/30/20   Blood Gas, Arterial -    Specimen: Arterial Blood   Result Value Ref Range    Site Right Radial     Selwyn's Test Positive     pH, Arterial 7.403 7.350 - 7.450 pH units    pCO2, Arterial 39.7 35.0 - 45.0 mm Hg    pO2, Arterial 75.7 (L) 83.0 - 108.0 mm Hg    HCO3, Arterial 24.8 20.0 - 26.0 mmol/L    Base Excess, Arterial 0.0 0.0 - 2.0 mmol/L    O2 Saturation, Arterial 96.2 94.0 - 99.0 %    Temperature 37.0 C    Barometric Pressure for Blood Gas 751 mmHg    Modality Room Air   "   Ventilator Mode NA     Collected by [id number]     pCO2, Temperature Corrected 39.7 35 - 45 mm Hg    pH, Temp Corrected 7.403 7.350 - 7.450 pH Units    pO2, Temperature Corrected 75.7 (L) 83 - 108 mm Hg       Lab Results   Component Value Date    PSA 2.9 07/16/2020    PSA 2.9 06/28/2019    PSA 2.6 06/22/2018        Lab Results:  CBC:  Lab Results - Last 18 Months   Lab Units 03/04/21  0957 11/14/20  0217 11/13/20  0745 07/16/20  0850   WBC K/uL 10.8 11.0* 11.1* 7.71   HEMOGLOBIN g/dL 14.5 13.9* 15.0 15.2   HEMATOCRIT % 44.2 42.0 45.1 45.3   PLATELETS K/uL 184 188 177 178      BMP/CMP:  Lab Results - Last 18 Months   Lab Units 07/16/20  0850 01/16/20  0710   SODIUM mmol/L 138 139   POTASSIUM mmol/L 4.4 4.2   CHLORIDE mmol/L 102 102   TOTAL CO2 mmol/L 24.7 27.0   GLUCOSE mg/dL 103* 98   BUN mg/dL 21 18   CREATININE mg/dL 0.93 1.03   EGFR IF NONAFRICN AM mL/min/1.73 79 71   EGFR IF AFRICN AM mL/min/1.73 96 86   CALCIUM mg/dL 9.6 9.1     HEPATIC:  Lab Results - Last 18 Months   Lab Units 07/16/20  0850   ALT (SGPT) U/L 17   AST (SGOT) U/L 16   ALK PHOS U/L 56     THYROID:  Lab Results - Last 18 Months   Lab Units 03/04/21  1549 07/16/20  0850   TSH uIU/mL  --  2.180   FREE T4 ng/dL 1.17  --      A1C:  Lab Results - Last 18 Months   Lab Units 11/14/20  0217 07/16/20  0850 01/16/20  0710   HEMOGLOBIN A1C % 5.8 5.90* 6.00*     PSA:  Lab Results - Last 18 Months   Lab Units 07/16/20  0850   PSA ng/mL 2.9           Objective   /70   Pulse 77   Temp 97.8 °F (36.6 °C) (Infrared)   Resp 16   Ht 185.4 cm (73\")   Wt 90.3 kg (199 lb)   SpO2 94%   BMI 26.25 kg/m²   Body mass index is 26.25 kg/m².     Recent Vitals       11/5/2020 11/17/2020 3/11/2021       BP:  118/72  124/74  116/70     Pulse:  75  76  77     Temp:  97.1 °F (36.2 °C)  97.5 °F (36.4 °C)  97.8 °F (36.6 °C)     Weight:  88.9 kg (196 lb)  89.9 kg (198 lb 3.2 oz)  90.3 kg (199 lb)     BMI (Calculated):  25.9  26.2  26.3         Physical Exam  GENERAL:  Well " nourished/developed in no acute distress.  SKIN: Turgor excellent, without wound, rash, lesion.  HEENT: Normal cephalic without trauma.  Pupils equal round reactive to light. Extraocular motions full without nystagmus.   External canals nonobstructive nontender without reddness. Tymphatic membranes annie with coby structures intact.   Oral cavity without growths, exudates, and moist.  Posterior pharynx without mass, obstruction, redness.  No thyromegaly, mass, tenderness, lymphadenopathy and supple.  CV: Regular rhythm.  No murmur, gallop,  edema. Posterior pulses intact.  No carotid bruits.  CHEST: No chest wall tenderness or mass.   LUNGS: Symmetric motion with clear to auscultation.  No dullness to percussion  ABD: Soft, nontender without mass.   ORTHO: Symmetric extremities without swelling/point tenderness.  Full gross range of motion..  NEURO: CN 2-12 grossly intact.  Symmetric facies. 1/4 x bicep knee ankle equal reflexes.  UE/LE   3-4/5 strength throughout.  Nonfocal use extremities. Speech clear.  Finger/nose and tandom walking ok.  Turning head; causes room to spin.     PSYCH: Oriented x 3.  Pleasant calm, well kept.  Purposeful/directed conservation with intact short/long gross memory.       Assessment/Plan     1. Coronary artery disease involving native coronary artery of native heart without angina pectoris    2. Essential hypertension    3. SOB: cough, CAD        Rx: reviewed/changes:  New Medications Ordered This Visit   Medications   • albuterol sulfate HFA (ProAir HFA) 108 (90 Base) MCG/ACT inhaler     Sig: Inhale 2 puffs Every 6 (Six) Hours As Needed for Wheezing.     Dispense:  8 g     Refill:  0     LAB/Testing/Referrals: reviewed/orders:   Today:   No orders of the defined types were placed in this encounter.    Usual:   same    Discussions:   Maybe see if albuterol emperic helps; let us know    Body mass index is 26.25 kg/m².   Patient's Body mass index is 26.25 kg/m². BMI is within normal  parameters. No follow-up required..  Non-smoker  Elver Gordon  reports that he quit smoking about 33 years ago. His smoking use included cigarettes. He started smoking about 59 years ago. He has a 22.50 pack-year smoking history. He has never used smokeless tobacco..     There are no Patient Instructions on file for this visit.    Follow up: Return for lab;, Dr Lorenzo-, as planned;.  Future Appointments   Date Time Provider Department Center   7/20/2021  9:00 AM Andre Lorenzo MD [facility]              Current outpatient and discharge medications have been reconciled for the patient.

## 2021-03-18 NOTE — PROGRESS NOTES
[facility]                            Date:  3/18/2021  Patient: Darvin Banegas  Age:  76 y.o., 1945      Reason for evaluation:         SUBJECTIVE:    Returns today follow-up assessment. Since her last adjustment of medications he feels somewhat improved. Dyspnea less apparent but still having some denies chest pain. He did get his pulmonary function test performed apparently has some degree of COPD but not felt to be severe. No other issues reported. Blood pressure 136/70 heart 68. Review of Systems   Constitutional: Negative. Negative for chills, fever and unexpected weight change. HENT: Negative. Eyes: Negative. Respiratory: Negative. Negative for shortness of breath. Cardiovascular: Negative. Negative for chest pain. Gastrointestinal: Negative. Negative for diarrhea, nausea and vomiting. Endocrine: Negative. Genitourinary: Negative. Musculoskeletal: Negative. Skin: Negative. Neurological: Negative. All other systems reviewed and are negative. OBJECTIVE:     /70   Pulse 68   Ht 6' 1\" (1.854 m)   Wt 201 lb (91.2 kg)   BMI 26.52 kg/m²     Labs:   CBC: No results for input(s): WBC, HGB, HCT, PLT in the last 72 hours. BMP:No results for input(s): NA, K, CO2, BUN, CREATININE, LABGLOM, GLUCOSE in the last 72 hours. BNP: No results for input(s): BNP in the last 72 hours. PT/INR: No results for input(s): PROTIME, INR in the last 72 hours. APTT:No results for input(s): APTT in the last 72 hours. CARDIAC ENZYMES:No results for input(s): CKTOTAL, CKMB, CKMBINDEX, TROPONINI in the last 72 hours. FASTING LIPID PANEL:  Lab Results   Component Value Date    HDL 26 03/05/2021    LDLCALC 38 03/05/2021    TRIG 79 03/05/2021     LIVER PROFILE:No results for input(s): AST, ALT, LABALBU in the last 72 hours.         Past Medical History:   Diagnosis Date    Arthritis     CAD (coronary artery disease) 3/4/2021    Chronic kidney disease Worry: Not on file     Inability: Not on file    Transportation needs     Medical: Not on file     Non-medical: Not on file   Tobacco Use    Smoking status: Former Smoker     Packs/day: 1.50     Years: 22.00     Pack years: 33.00     Types: Cigarettes    Smokeless tobacco: Former User     Types: Chew   Substance and Sexual Activity    Alcohol use: Never     Frequency: Never    Drug use: Never    Sexual activity: Not Currently     Partners: Female   Lifestyle    Physical activity     Days per week: Not on file     Minutes per session: Not on file    Stress: Not on file   Relationships    Social connections     Talks on phone: Not on file     Gets together: Not on file     Attends Jain service: Not on file     Active member of club or organization: Not on file     Attends meetings of clubs or organizations: Not on file     Relationship status: Not on file    Intimate partner violence     Fear of current or ex partner: Not on file     Emotionally abused: Not on file     Physically abused: Not on file     Forced sexual activity: Not on file   Other Topics Concern    Not on file   Social History Narrative    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[employer]     48 years only marriage    He has 1 daughter and 2 sons    Education high school 1 year college    Previously [employer] active duty 3 years   1967 he has gone to the [employer] for healthcare in the past    He enjoys shooting sports    Smoked until age 43 packs/day denies alcohol consumption or substance usage       Physical Examination:  /70   Pulse 68   Ht 6' 1\" (1.854 m)   Wt 201 lb (91.2 kg)   BMI 26.52 kg/m²   Physical Exam  Vitals signs reviewed. Constitutional:       Appearance: He is well-developed. Neck:      Vascular: No carotid bruit or JVD. Cardiovascular:      Rate and Rhythm: Normal rate and regular rhythm. Heart sounds: Normal heart sounds. No murmur.  No friction

## 2021-05-20 NOTE — TELEPHONE ENCOUNTER
Patient left message that he is having trouble get his metoprolol. He said they sent it the first time but wont now. I tried to refill it with correct dose of 100 mg BID per Dr. Bell's last night and it was showing it was tier 6 and needed PA. I tried to call patient but his line is busy.

## 2021-05-20 NOTE — TELEPHONE ENCOUNTER
It patient's medication list it says metoprolol tartrate 75 mg BID, Dr. Bell's note states to increase Toprol XL to 100 mg BID. Which should patient be taking? Also neither metoprolol at that dose is covered by his insurance.

## 2021-05-21 NOTE — TELEPHONE ENCOUNTER
Still not able to reach patient due to busy tone. Asked the pharmacist to reach out as well if possible. He will.

## 2021-05-21 NOTE — TELEPHONE ENCOUNTER
Confirmed by [facility] it should of been lopressor 100 bid. Will call pharmacy and correct.

## 2021-05-24 NOTE — TELEPHONE ENCOUNTER
Called and spoke with [facility] to see why patient had not received medication. They had 3 different Rx on file and was not sure the correct one for the patient. I verified it should be Metoprolol Tartrate 100 mg BID #180 with 3 refills. She verbalized understanding of Rx and advised she would get it sent to patient asap.   I tried to call patient to let him know medication should be there soon but was unable to reach him or leave a message due to busy signal.

## 2021-07-12 NOTE — PROGRESS NOTES
[facility]   Established Patient Office Visit  [address]  [address]  [phone]        OFFICE VISIT:  2021    José Luis Valero - : 1945    Reason For Visit:  Frances Greene is a 68 y.o. male who is here for 3 Month Follow-Up    1. Coronary artery disease involving native coronary artery of native heart without angina pectoris    2. Essential hypertension    3. Hyperlipidemia, unspecified hyperlipidemia type    4. Chronic obstructive pulmonary disease, unspecified COPD type ([facility] [address]      Patient with a history of coronary artery disease, hypertension, COPD and hyperlipidemia. He is a patient of Dr. Mo Bowman. Patient presents to clinic today for 3-month follow-up. Patient was last seen in the office on 3/18/2021 and Toprol-XL was changed to 100 mg twice daily. Patient presents to clinic today with no complaints of chest pain, pressure or tightness. Continues to have shortness of breath. There is no orthopnea or PND. Patient does have a pulse ox at home and has noticed that his oxygen rate can go down to 84 when he is walking. Subjective    José Luis Valero is a 68 y.o. male with the following history as recorded in [facility]:    Patient Active Problem List    Diagnosis Date Noted    CAD (coronary artery disease) 2021    Abnormal findings on cardiac catheterization 2021    Arthritis     Hypertension     [facility]     Current Outpatient Medications   Medication Sig Dispense Refill    isosorbide mononitrate (IMDUR) 60 MG extended release tablet Take 1 tablet by mouth 2 times daily 60 tablet 5    metoprolol (LOPRESSOR) 100 MG tablet Take 1 tablet by mouth 2 times daily 14 tablet 1    nitroGLYCERIN (NITROSTAT) 0.4 MG SL tablet up to max of 3 total doses.  If no relief after 1 dose, call 911. 25 tablet 0    aspirin 81 MG chewable tablet Take 1 tablet by mouth daily 30 tablet 3    atorvastatin (LIPITOR) 40 MG tablet Take 1 tablet by mouth nightly 30 tablet 3    clopidogrel (PLAVIX) 75 MG tablet Take 1 tablet by mouth daily 30 tablet 3    finasteride (PROSCAR) 5 MG tablet Take 5 mg by mouth daily       No current facility-administered medications for this visit. Allergies: Patient has no known allergies. Past Medical History:   Diagnosis Date    Arthritis     CAD (coronary artery disease) 3/4/2021    Chronic kidney disease     enlarged prostate    Hypertension      Past Surgical History:   Procedure Laterality Date    CHOLECYSTECTOMY      COLONOSCOPY      ENDOSCOPY, COLON, DIAGNOSTIC      HERNIA REPAIR      JOINT REPLACEMENT Bilateral     SKIN BIOPSY       Family History   Problem Relation Age of Onset    Cancer Mother     Heart Attack Father     Heart Disease Father     High Blood Pressure Father     Arthritis Father     Cancer Brother     Substance Abuse Brother     Arthritis Brother     High Blood Pressure Brother     No Known Problems Maternal Aunt     Heart Disease Maternal Uncle     High Blood Pressure Maternal Uncle     Coronary Art Dis Maternal Uncle     No Known Problems Paternal Aunt     No Known Problems Paternal Uncle     Heart Attack Brother     Heart Disease Brother     Coronary Art Dis Brother     High Blood Pressure Brother      Social History     Tobacco Use    Smoking status: Former Smoker     Packs/day: 1.50     Years: 22.00     Pack years: 33.00     Types: Cigarettes    Smokeless tobacco: Former User     Types: Chew   Substance Use Topics    Alcohol use: Never          Review of Systems:    Review of Systems   Constitutional: Positive for activity change. Negative for chills, diaphoresis, fatigue and fever. HENT: Negative for congestion and sore throat. Respiratory: Positive for shortness of breath. Negative for cough, chest tightness and wheezing. Cardiovascular: Negative for chest pain, palpitations and leg swelling.    Neurological: 132/72. Patient is on Lopressor 100 mg twice daily. No Continue current medications: Yes                     Orders Placed This Encounter   Procedures   Laci Glaser MD, Pulmonary Disease, [facility]     Referral Priority:   Routine     Referral Type:   Eval and Treat     Referral Reason:   Specialty Services Required     Referred to Provider:   Robby Valdivia MD     Requested Specialty:   Pulmonary Disease     Number of Visits Requested:   1     No orders of the defined types were placed in this encounter. Discussed with patient and spouse. Return in about 3 months (around 10/12/2021) for Dr Buzz Pena . I greatly appreciate the opportunity to meet [facility] and your confidence in allowing me to participate in his cardiovascular care. OLIVIA Modoy NP  7/12/2021 1:45 PM CDT                    This dictation was generated by voice recognition computer software. Although all attempts are made to edit dictation for accuracy, there may be errors in the transcription that are not intended.

## 2021-07-19 NOTE — PROGRESS NOTES
Subjective    Mr. Gordon is 76 y.o. male    Chief Complaint: Elevated PSA.    History of Present Illness  Elevated PSA  Patient is here with an elevated PSA. The PSA was drawn1 week(s). He does not have a family history of prostate cancer. His AUA Symptom Score is 2 /35. Voiding symptoms include  Intermittency and Weakened stream. Denies Frequency, Urgency, Straining and Nocturia. Voiding symptoms began several years  . These have been gradual in onset. Negative X 2 showed inflammation, last PSA with finasteride correction 5.8. PSA 1.9    Lab Results   Component Value Date    PSA 1.930 07/19/2021    PSA 2.9 07/16/2020    PSA 2.9 06/28/2019         The following portions of the patient's history were reviewed and updated as appropriate: allergies, current medications, past family history, past medical history, past social history, past surgical history and problem list.    Review of Systems   Constitutional: Negative for chills and fever.   Gastrointestinal: Negative for abdominal pain, anal bleeding and blood in stool.   Genitourinary: Negative for dysuria, frequency, hematuria and urgency.         Current Outpatient Medications:   •  acetaminophen (TYLENOL) 325 MG tablet, Take 650 mg by mouth Every 6 (Six) Hours As Needed for Mild Pain ., Disp: , Rfl:   •  albuterol sulfate HFA (ProAir HFA) 108 (90 Base) MCG/ACT inhaler, Inhale 2 puffs Every 6 (Six) Hours As Needed for Wheezing., Disp: 8 g, Rfl: 0  •  aspirin 81 MG chewable tablet, Chew 81 mg Daily., Disp: , Rfl:   •  atorvastatin (LIPITOR) 40 MG tablet, TAKE 1 TABLET BY MOUTH AT  NIGHT, Disp: 90 tablet, Rfl: 3  •  azithromycin (ZITHROMAX) 500 MG tablet, , Disp: , Rfl:   •  calcium carbonate (TUMS) 500 MG chewable tablet, Chew 1 tablet Daily., Disp: , Rfl:   •  cefdinir (OMNICEF) 300 MG capsule, Take 300 mg by mouth., Disp: , Rfl:   •  clopidogrel (PLAVIX) 75 MG tablet, Take 1 tablet by mouth Daily., Disp: 90 tablet, Rfl: 3  •  finasteride (PROSCAR) 5 MG tablet,  "Take 1 tablet by mouth Daily., Disp: 90 tablet, Rfl: 3  •  isosorbide mononitrate (IMDUR) 60 MG 24 hr tablet, Take 60 mg by mouth Daily., Disp: , Rfl:   •  metoprolol tartrate (LOPRESSOR) 100 MG tablet, Take 100 mg by mouth., Disp: , Rfl:     Past Medical History:   Diagnosis Date   • Elevated PSA    • Hx of colonic polyps    • Hypertension    • Hypertriglyceridemia        Past Surgical History:   Procedure Laterality Date   • CHOLECYSTECTOMY     • COLONOSCOPY W/ POLYPECTOMY  2013    2 Tubular adenomas at 45cm and 70 cm repeat exam in 5 years   • KNEE SURGERY         Social History     Socioeconomic History   • Marital status:      Spouse name: Arlin   • Number of children: 3   • Years of education: 13.5   • Highest education level: Not on file   Tobacco Use   • Smoking status: Former Smoker     Packs/day: 1.50     Years: 15.00     Pack years: 22.50     Types: Cigarettes     Start date:      Quit date: 1987     Years since quittin.1   • Smokeless tobacco: Never Used   Vaping Use   • Vaping Use: Never used   Substance and Sexual Activity   • Alcohol use: No   • Drug use: No   • Sexual activity: Defer       Family History   Problem Relation Age of Onset   • No Known Problems Father    • No Known Problems Mother    • Colon polyps Brother    • Colon cancer Neg Hx        Objective    Temp 97.6 °F (36.4 °C) (Temporal)   Ht 185.4 cm (73\")   Wt 90.1 kg (198 lb 9.6 oz)   BMI 26.20 kg/m²     Physical Exam        Results for orders placed or performed in visit on 21   POC Urinalysis Dipstick, Multipro    Specimen: Urine   Result Value Ref Range    Color Yellow Yellow, Straw, Dark Yellow, Ladan    Clarity, UA Clear Clear    Glucose, UA Trace (A) Negative, 1000 mg/dL (3+) mg/dL    Bilirubin Negative Negative    Ketones, UA Negative Negative    Specific Gravity  1.020 1.005 - 1.030    Blood, UA Negative Negative    pH, Urine 6.0 5.0 - 8.0    Protein, POC Negative Negative mg/dL    Urobilinogen, " UA Normal Normal    Nitrite, UA Negative Negative    Leukocytes Negative Negative     Assessment and Plan    Diagnoses and all orders for this visit:    1. Elevated prostate specific antigen (PSA) (Primary)  -     POC Urinalysis Dipstick, Multipro  -     PSA, Total & Free; Future    2. Benign prostatic hyperplasia with urinary obstruction  -     finasteride (PROSCAR) 5 MG tablet; Take 1 tablet by mouth Daily.  Dispense: 90 tablet; Refill: 3  -     PSA, Total & Free; Future    Patient with a history of 2 negative biopsies both showed inflammation.  His last biopsy was for a PSA of 7.2.   He has somewhat of a zigzag pattern to his PSA.  His PSA today is 1.9 corrected for finasteride 3.8       Continue Finasteride.      We will continue yearly follow-up with PSA and percent free.

## 2021-07-20 PROBLEM — M15.0 PRIMARY GENERALIZED (OSTEO)ARTHRITIS: Status: ACTIVE | Noted: 2017-06-05

## 2021-07-20 NOTE — PATIENT INSTRUCTIONS
"Medicare/insurances offer certain visits called \"wellness/annual\" that allows for time to deal with and  review the many aspects of \"being well\" that just might not get mentioned during other visits with your doctor through the year.  This includes things like reviews of health screenings (mammograms, various labs),  weight, exercise, vaccines for just a few examples.      In order to help you with this we wish to make you aware of a few things for you to consider:    1. Advanced directives.  These are documents used to help direct your care if your health/situation should reach a point that you cannot make your own decisions.  While it is likely you do not currently have a need for these documents now; it is something that we all might face at any time.   The hand outs you are being given today are simply for you to review and use to learn more about these documents and consider them as you wish.      2. Vaccines: Certain vaccines are important after age 50, 60, and 65 and some health situations (for example COPD), require even boosters beyond age 65.  We are happy to review with you your vaccine status and vaccines that might be needed for you at this point:      a. Tetanus.   Like anyone this needs to given every 10 years; sooner for/with lacerations/wounds.   Likely when getting this booster it needs to be a tetanus called Tdap (tetanus mixed with diptheria and pertussis).   Years ago you had this vaccine.  We now know we can lose our immunity to pertussis (a part of this vaccine) and run a risk of catching this.  Now only would this make us ill; but more importantly we can spread this to very young children (and for them it can be a much more dangerous illness).   We call this the grandparent vaccine for this reason.     b. Pneumonia (strept).   This comes now with two brands.   It is recommended to take pneumovax first; and a year later take the cousin prevnar.  Even if you have had these before; we need to " review when and your current health situation/s as you may need boosters and even recently the CDC has made recent/new recommendations for pneumovax.      c. Shingles.  You do not want to catch shingles.  Though you will recover from this; the pain associated with shingles can be severe.  Even if you have had the now older zostavax, or have had shingles; it is recommended you still get the Shingrix (the new vaccine just available early 2018 shingles vaccine).  A new shingles vaccine (a shot to lower your chance of catching shingles) is now available (shingrix).  This vaccine is the second vaccine created for this purpose; (we have had zostavax for years).  Shingrix provides a much better and longer immunity for shingles than zostavax.  For this and other reasons Shingrix can be started at age 50.  If you have had zostavax in the past; you can still take Shingrix.      This vaccine is not paid for in a doctor's office by medicare, medicaid and probably most insurances.  Like zostavax; this is covered in drug stores.  This is a vaccine that if you chose to get you need to get at a drug store that gives vaccines (like [facility], [facility] and [facility].      d. Yearly flu vaccine given from September through April each year (there is a special vaccine for those over 65).     e. Travel vaccines:  If you are one to do international travel; be sure and ask us for any particular unusual vaccines you may need.     f.  Miscellaneous:  If you have certain health situations/disease you may need specific/particular vaccines not give to the general public.     The vaccines we have on record for you include:   Immunization History   Administered Date(s) Administered   • Fluzone High Dose =>65 Years ([facility] ONLY) 09/27/2017   • Pneumococcal Conjugate 13-Valent (PCV13) 06/19/2018   • Pneumococcal, Unspecified 02/04/2007   • Zostavax 10/01/2016       If you have record of other vaccines and want them to show in  your chart here; please talk to our nurses about having your vaccine record updated.     3. Exercise: regular cardio exercise something everyone should consider and try to do; even if health limitations (ie find that exercise UE/LE/cardio that they can tolerate).   Normal weight a goal for everyone (as we discussed)    4. Healthy diet helpful for weight management, illness prevention.     5. If over 50-screening exams include men PSA/rectal exam, women mammograms, and everyone colonoscopy screening for colon cancer.    6. If you use tobacco of any kind or e-products you should stop. We are providing you some information to consider that could make this process easier.      ##################################

## 2021-07-20 NOTE — PROGRESS NOTES
The ABCs of the Annual Wellness Visit  Subsequent Medicare Wellness Visit    Chief Complaint   Patient presents with   • Medicare Wellness-subsequent       Subjective   History of Present Illness:  Elver Gordon is a 76 y.o. male who presents for a Subsequent Medicare Wellness Visit.    HEALTH RISK ASSESSMENT    Recent Hospitalizations:  No hospitalization(s) within the last year.    Current Medical Providers:  Patient Care Team:  Andre Lorenzo MD as PCP - General  Zuniga, Kwaku Callahan MD as Consulting Physician (Urology)  David Morin MD as Consulting Physician (Otolaryngology)  Margo Alamo APRN (Inactive) as Nurse Practitioner (Otolaryngology)    Smoking Status:  Social History     Tobacco Use   Smoking Status Former Smoker   • Packs/day: 1.50   • Years: 15.00   • Pack years: 22.50   • Types: Cigarettes   • Start date:    • Quit date: 1987   • Years since quittin.1   Smokeless Tobacco Never Used       Alcohol Consumption:  Social History     Substance and Sexual Activity   Alcohol Use No       Depression Screen:   PHQ-2/PHQ-9 Depression Screening 2021   Little interest or pleasure in doing things 0   Feeling down, depressed, or hopeless 0   Total Score 0       Fall Risk Screen:  STEADI Fall Risk Assessment was completed, and patient is at LOW risk for falls.Assessment completed on:2021    Health Habits and Functional and Cognitive Screening:  Functional & Cognitive Status 2021   Do you have difficulty preparing food and eating? No   Do you have difficulty bathing yourself, getting dressed or grooming yourself? No   Do you have difficulty using the toilet? No   Do you have difficulty moving around from place to place? No   Do you have trouble with steps or getting out of a bed or a chair? No   Current Diet Well Balanced Diet   Dental Exam Up to date   Eye Exam Up to date   Exercise (times per week) 0 times per week   Current Exercises Include No Regular Exercise   Do  you need help using the phone?  No   Are you deaf or do you have serious difficulty hearing?  No   Do you need help with transportation? No   Do you need help shopping? No   Do you need help preparing meals?  No   Do you need help with housework?  No   Do you need help with laundry? No   Do you need help taking your medications? No   Do you need help managing money? No   Do you ever drive or ride in a car without wearing a seat belt? No   Have you felt unusual stress, anger or loneliness in the last month? No   Who do you live with? Spouse   If you need help, do you have trouble finding someone available to you? No   Have you been bothered in the last four weeks by sexual problems? No   Do you have difficulty concentrating, remembering or making decisions? No         Does the patient have evidence of cognitive impairment? No    Asprin use counseling:Taking ASA appropriately as indicated    Age-appropriate Screening Schedule:  Refer to the list below for future screening recommendations based on patient's age, sex and/or medical conditions. Orders for these recommended tests are listed in the plan section. The patient has been provided with a written plan.    Health Maintenance   Topic Date Due   • ZOSTER VACCINE (2 of 3) 11/26/2016   • INFLUENZA VACCINE  10/01/2021   • LIPID PANEL  03/05/2022   • TDAP/TD VACCINES (2 - Td or Tdap) 07/20/2031          The following portions of the patient's history were reviewed and updated as appropriate: allergies, current medications, past family history, past medical history, past social history, past surgical history and problem list.    Outpatient Medications Prior to Visit   Medication Sig Dispense Refill   • acetaminophen (TYLENOL) 325 MG tablet Take 650 mg by mouth Every 6 (Six) Hours As Needed for Mild Pain .     • aspirin 81 MG chewable tablet Chew 81 mg Daily.     • atorvastatin (LIPITOR) 40 MG tablet TAKE 1 TABLET BY MOUTH AT  NIGHT 90 tablet 3   • calcium carbonate  (TUMS) 500 MG chewable tablet Chew 1 tablet Daily.     • clopidogrel (PLAVIX) 75 MG tablet Take 1 tablet by mouth Daily. 90 tablet 3   • finasteride (PROSCAR) 5 MG tablet TAKE 1 TABLET BY MOUTH  DAILY 90 tablet 3   • isosorbide mononitrate (IMDUR) 60 MG 24 hr tablet Take 60 mg by mouth Daily.     • metoprolol tartrate (LOPRESSOR) 100 MG tablet Take 100 mg by mouth.     • Aspirin Buf,CaCarb-MgCarb-MgO, 81 MG tablet      • isosorbide dinitrate (ISORDIL) 40 MG tablet      • albuterol sulfate HFA (ProAir HFA) 108 (90 Base) MCG/ACT inhaler Inhale 2 puffs Every 6 (Six) Hours As Needed for Wheezing. 8 g 0   • metoprolol tartrate (LOPRESSOR) 50 MG tablet 50 mg 2 (Two) Times a Day.       No facility-administered medications prior to visit.       Patient Active Problem List   Diagnosis   • Wellness examination-done   • Hypertension   • Impaired fasting glucose   • Elevated PSA-astudillo   • Urinary retention: astudillo   • Thrombocytopenia (CMS/HCC)   • Primary generalized (osteo)arthritis   • Vertigo   • Claustrophobia   • Hx of adenomatous colonic polyps   • Laboratory test*   • SOB: cough, CAD   • History of tobacco use   • History of joint replacement   • Coronary artery disease   • Hyperlipidemia   • Anticoagulated: CAD/ASA 81, plavix       Advanced Care Planning:  ACP discussion was held with the patient during this visit. Patient has an advance directive (not in EMR), copy requested.    Review of Systems  GENERAL:  Active/slower with limits, speed, stamina for age and infrequent vertigo. Sleep is ok. No fever now/recent.  ENDO:  No syncope, near or diaphoretic sweaty spells.  HEENT: No head injury or headache,  No vision change,  Same mild tinnitis/hearing loss.  Ears without pain/drainage.  No sore throat.  No significant nasal/sinus congestion/drainage. No epistaxis.  CHEST: No chest wall tenderness or mass. No significant cough, without wheeze, hemoptysis; above SOB  CV: No chest pain, palpitations, ankle edema.  GI: No  "heartburn, dysphagia.  No abdominal pain, diarrhea, constipation, rectal bleeding, or melena.    :  Voids without dysuria, or incontinence to completion.  ORTHO: No painful/swollen joints but various on /off sore.  No sore neck or back.  No acute neck or back pain without recent injury.   NEURO: No dizziness, weakness of extremities.  No numbness/paresthesias.   PSYCH: No memory loss.  Mood good; not anxious, depressed but/and not suicidal.  Tolerated stress.      Screening:  Mammogram:NA  Bone density: NA  Low dose CT chest: Tobacco-smoker/age 18/1.5 ppd/dc 85 (22 py): NA  GI: Colon-polyp/[facility]/4.2.2018/5y  Prostate: Know/confirmed 7.20.21  Zuniga/confirmed/7.17.19  Usual lab order  any labs urology/other MD wants  6m BMP, A1c  12m CBC, CMP, A1c,  LIPID, TSH, Vit D, PSAs    Data reviewed:   Recent admit/ER/MD visits: [facility]/cardiology  Last cardiac testing:   Echo: cath 11.16.20/[facility]    Compared to one year ago, the patient feels his physical health is the same.  Compared to one year ago, the patient feels his mental health is the same.    Reviewed chart for potential of high risk medication in the elderly: yes  Reviewed chart for potential of harmful drug interactions in the elderly:yes    Objective         Vitals:    07/20/21 0845   BP: 116/68   BP Location: Left arm   Pulse: 74   Resp: (!) 30   Temp: 97.3 °F (36.3 °C)   SpO2: 94%   Weight: 87.5 kg (193 lb)   Height: 185.4 cm (73\")   PainSc: 0-No pain       Body mass index is 25.46 kg/m².  Discussed the patient's BMI with him. The BMI is in the acceptable range.    Physical Exam  GENERAL:  Well nourished/developed in no acute distress.  SKIN: Turgor excellent, without wound, rash, lesion.  HEENT: Normal cephalic without trauma.  Pupils equal round reactive to light. Extraocular motions full without nystagmus.   External canals nonobstructive nontender without reddness. Tymphatic membranes annie with coby structures intact.   Oral cavity without growths, " exudates, and moist.  Posterior pharynx without mass, obstruction, redness.  No thyromegaly, mass, tenderness, lymphadenopathy and supple.  CV: Regular rhythm.  No murmur, gallop,  edema. Posterior pulses intact.  No carotid bruits.  CHEST: No chest wall tenderness or mass.   LUNGS: Symmetric motion with clear to auscultation.    ABD: Soft, nontender without mass.   ORTHO: Symmetric extremities without swelling/point tenderness.  Full gross range of motion..  NEURO: CN 2-12 grossly intact.  Symmetric facies. 1/4 x bicep knee ankle equal reflexes.  UE/LE   3-4/5 strength throughout.  Nonfocal use extremities. Speech clear.  Finger/nose and tandom walking ok.  Turning head; causes room to spin.     PSYCH: Oriented x 3.  Pleasant calm, well kept.  Purposeful/directed conservation with intact short/long gross memory.          Assessment/Plan   Medicare Risks and Personalized Health Plan  CMS Preventative Services Quick Reference  Advance Directive Discussion  Colon Cancer Screening  Immunizations Discussed/Encouraged (specific immunizations; Tdap, Influenza, Pneumococcal 23, Shingrix and COVID19 )  Prostate Cancer Screening     The above risks/problems have been discussed with the patient.  Pertinent information has been shared with the patient in the After Visit Summary.  Follow up plans and orders are seen below in the Assessment/Plan Section.    Diagnoses and all orders for this visit:    1. Essential hypertension    2. Mixed hyperlipidemia    3. Coronary artery disease involving native coronary artery of native heart without angina pectoris    4. Anticoagulated: CAD/ASA 81, plavix    5. Impaired fasting glucose  -     Hemoglobin A1c    6. Urinary retention: astudillo    7. SOB: cough, CAD    8. Immunization due  -     Tdap Vaccine Greater Than or Equal To 8yo IM    9. Wellness examination-done      Follow Up:  Return for no stick A1c if able; then lab/Dr Lorenzo 5.2021.     An After Visit Summary and PPPS were given to  the patient.

## 2021-07-20 NOTE — PROGRESS NOTES
Subjective   Elver Gordon is a 76 y.o. male presenting with chief complaint of:   Chief Complaint   Patient presents with   • Medicare Wellness-subsequent       History of Present Illness :  Alone.  6m visit as issues were discussed and wellness.       Has multiple chronic problems to consider that might have a bearing on today's issues;  an interval appointment.       Chronic/acute problems reviewed today:   1. Essential hypertension Chronic/stable. Stable here past/and home blood pressures.  No significant chest pain, SOB, LE edema, orthopnea, near syncope, dizziness/light headness.   Recent Vitals       11/17/2020 3/11/2021 7/20/2021       BP:  124/74  116/70  116/68     Pulse:  76  77  74     Temp:  97.5 °F (36.4 °C)  97.8 °F (36.6 °C)  97.3 °F (36.3 °C)     Weight:  89.9 kg (198 lb 3.2 oz)  90.3 kg (199 lb)  87.5 kg (193 lb)     BMI (Calculated):  26.2  26.3  25.5            2. Mixed hyperlipidemia Chronic/stable.  Tolerated use of Rx with labs showing improved lipid values and tolerant liver labs. No muscle aches unexpected.       3. Coronary artery disease involving native coronary artery of native heart without angina pectoris chronic/stable as no chest pain, SOB, use of NTG     4. Anticoagulated: CAD/ASA 81, plavix Chronic/stable reason and use of.  Denies bleeding issues; especially epistaxis, melena, hematochezia.  Upper arms/others do not bruise easily.  No significant bleeding or falls.      5. Impaired fasting glucose Chronic/stable.  No problem/pattern hypoglycemia/hyperglycemia manifest by poly- dypsia, phagia, uria, or sweats, diaphoretic episodes, syncope/near.     6. Urinary retention: astudillo Chronic/stable.  Slower but tolerated stream with complete emptying.  Nocturia on occ; tolerated.  No desire to persure evaluations/surgery.      7. SOB: cough, CAD Chronic/stable:  SOB worse with exertion/ok at rest.  Contributing diagnosis: known cardiac.  Cardiology has arranged for pulmonary reviewed   8.  Immunization due Chronic/ongoing need to review pro/cons for various vaccinations based on age, health issues.  Needs vaccination today for shingrex, Tdap.     9. Wellness examination-done Chronic ongoing over time screening or advice for general health care/wellness.        Has an/another acute issue today: none.    The following portions of the patient's history were reviewed and updated as appropriate: allergies, current medications, past family history, past medical history, past social history, past surgical history and problem list.      Current Outpatient Medications:   •  acetaminophen (TYLENOL) 325 MG tablet, Take 650 mg by mouth Every 6 (Six) Hours As Needed for Mild Pain ., Disp: , Rfl:   •  aspirin 81 MG chewable tablet, Chew 81 mg Daily., Disp: , Rfl:   •  atorvastatin (LIPITOR) 40 MG tablet, TAKE 1 TABLET BY MOUTH AT  NIGHT, Disp: 90 tablet, Rfl: 3  •  calcium carbonate (TUMS) 500 MG chewable tablet, Chew 1 tablet Daily., Disp: , Rfl:   •  clopidogrel (PLAVIX) 75 MG tablet, Take 1 tablet by mouth Daily., Disp: 90 tablet, Rfl: 3  •  finasteride (PROSCAR) 5 MG tablet, TAKE 1 TABLET BY MOUTH  DAILY, Disp: 90 tablet, Rfl: 3  •  isosorbide mononitrate (IMDUR) 60 MG 24 hr tablet, Take 60 mg by mouth Daily., Disp: , Rfl:   •  metoprolol tartrate (LOPRESSOR) 100 MG tablet, Take 100 mg by mouth., Disp: , Rfl:   •  albuterol sulfate HFA (ProAir HFA) 108 (90 Base) MCG/ACT inhaler, Inhale 2 puffs Every 6 (Six) Hours As Needed for Wheezing., Disp: 8 g, Rfl: 0    No problems with medications.    No Known Allergies    Review of Systems  GENERAL:  Active/slower with limits, speed, stamina for age and infrequent vertigo. Sleep is ok. No fever now/recent.  ENDO:  No syncope, near or diaphoretic sweaty spells.  HEENT: No head injury or headache,  No vision change,  Same mild tinnitis/hearing loss.  Ears without pain/drainage.  No sore throat.  No significant nasal/sinus congestion/drainage. No epistaxis.  CHEST: No chest  wall tenderness or mass. No significant cough, without wheeze, hemoptysis; above SOB  CV: No chest pain, palpitations, ankle edema.  GI: No heartburn, dysphagia.  No abdominal pain, diarrhea, constipation, rectal bleeding, or melena.    :  Voids without dysuria, or incontinence to completion.  ORTHO: No painful/swollen joints but various on /off sore.  No sore neck or back.  No acute neck or back pain without recent injury.   NEURO: No dizziness, weakness of extremities.  No numbness/paresthesias.   PSYCH: No memory loss.  Mood good; not anxious, depressed but/and not suicidal.  Tolerated stress.      Screening:  Mammogram:NA  Bone density: NA  Low dose CT chest: Tobacco-smoker/age 18/1.5 ppd/dc 85 (22 py): NA  GI: Colon-polyp/Mc/4.2.2018/5y  Prostate: Know/confirmed 7.20.21  Zuniga/confirmed/7.17.19  Usual lab order  any labs urology/other MD wants  6m BMP, A1c  12m CBC, CMP, A1c,  LIPID, TSH, Vit D, PSAs    Data reviewed:   Recent admit/ER/MD visits: care everwhere/cardiology  Last cardiac testing:   Echo: cath 11.16.20/    Lab Results:  Results for orders placed or performed in visit on 07/19/21   PSA Diagnostic    Specimen: Blood   Result Value Ref Range    PSA 1.930 0.000 - 4.000 ng/mL       A1C:  Lab Results - Last 18 Months   Lab Units 11/14/20  0217 07/16/20  0850   HEMOGLOBIN A1C % 5.8 5.90*     LIPID:  Lab Results - Last 18 Months   Lab Units 03/05/21  0131 11/14/20  0217 07/16/20  0850   CHOLESTEROL mg/dL 80* 120* 165   LDL CHOL mg/dL 38 71 111*   HDL CHOL mg/dL 26* 34* 39*   TRIGLYCERIDES mg/dL 79 74 73     PSA:  Lab Results - Last 18 Months   Lab Units 07/19/21  0731 07/16/20  0850   PSA ng/mL 1.930 2.9     CBC:  Lab Results - Last 18 Months   Lab Units 03/04/21  0957 11/14/20  0217 11/13/20  0745 07/16/20  0850   WBC K/uL 10.8 11.0* 11.1* 7.71   HEMOGLOBIN g/dL 14.5 13.9* 15.0 15.2   HEMATOCRIT % 44.2 42.0 45.1 45.3   PLATELETS K/uL 184 188 177 178      BMP/CMP:  Lab Results - Last 18 Months   Lab  "Units 07/16/20  0850   SODIUM mmol/L 138   POTASSIUM mmol/L 4.4   CHLORIDE mmol/L 102   TOTAL CO2 mmol/L 24.7   GLUCOSE mg/dL 103*   BUN mg/dL 21   CREATININE mg/dL 0.93   EGFR IF NONAFRICN AM mL/min/1.73 79   EGFR IF AFRICN AM mL/min/1.73 96   CALCIUM mg/dL 9.6     HEPATIC:  Lab Results - Last 18 Months   Lab Units 07/16/20  0850   ALT (SGPT) U/L 17   AST (SGOT) U/L 16   ALK PHOS U/L 56     THYROID:  Lab Results - Last 18 Months   Lab Units 03/04/21  1549 07/16/20  0850   TSH uIU/mL  --  2.180   FREE T4 ng/dL 1.17  --        Objective   /68 (BP Location: Left arm)   Pulse 74   Temp 97.3 °F (36.3 °C)   Resp (!) 30   Ht 185.4 cm (73\")   Wt 87.5 kg (193 lb)   SpO2 94%   BMI 25.46 kg/m²   Body mass index is 25.46 kg/m².    Recent Vitals       11/17/2020 3/11/2021 7/20/2021       BP:  124/74  116/70  116/68     Pulse:  76  77  74     Temp:  97.5 °F (36.4 °C)  97.8 °F (36.6 °C)  97.3 °F (36.3 °C)     Weight:  89.9 kg (198 lb 3.2 oz)  90.3 kg (199 lb)  87.5 kg (193 lb)     BMI (Calculated):  26.2  26.3  25.5           Physical Exam  GENERAL:  Well nourished/developed in no acute distress.  SKIN: Turgor excellent, without wound, rash, lesion.  HEENT: Normal cephalic without trauma.  Pupils equal round reactive to light. Extraocular motions full without nystagmus.   External canals nonobstructive nontender without reddness. Tymphatic membranes annie with coby structures intact.   Oral cavity without growths, exudates, and moist.  Posterior pharynx without mass, obstruction, redness.  No thyromegaly, mass, tenderness, lymphadenopathy and supple.  CV: Regular rhythm.  No murmur, gallop,  edema. Posterior pulses intact.  No carotid bruits.  CHEST: No chest wall tenderness or mass.   LUNGS: Symmetric motion with clear to auscultation.    ABD: Soft, nontender without mass.   ORTHO: Symmetric extremities without swelling/point tenderness.  Full gross range of motion..  NEURO: CN 2-12 grossly intact.  Symmetric " "facies. 1/4 x bicep knee ankle equal reflexes.  UE/LE   3-4/5 strength throughout.  Nonfocal use extremities. Speech clear.  Finger/nose and tandom walking ok.  Turning head; causes room to spin.     PSYCH: Oriented x 3.  Pleasant calm, well kept.  Purposeful/directed conservation with intact short/long gross memory.     Assessment/Plan     1. Essential hypertension    2. Mixed hyperlipidemia    3. Coronary artery disease involving native coronary artery of native heart without angina pectoris    4. Anticoagulated: CAD/ASA 81, plavix    5. Impaired fasting glucose    6. Urinary retention: astudillo    7. SOB: cough, CAD    8. Immunization due    9. Wellness examination-done      Discussion:  Wellness; especially vaccines, screening  Aware  has set up for pulmonary      Medical decision issues:   Data review above:   Rx: reviewed and decisions:   Same Rx for now   Visit today involved intensive drug monitoring: ie potential to cause serious morbidity or death:     Orders placed:   LAB/Testing/Referrals: reviewed/orders:   Today:   Orders Placed This Encounter   Procedures   • Tdap Vaccine Greater Than or Equal To 8yo IM   • Hemoglobin A1c     Chronic/recurrent labs above or change to:   same     Health maintenance:   Body mass index is 25.46 kg/m².  Patient's Body mass index is 25.46 kg/m². indicating that he is within normal range (BMI 18.5-24.9). No BMI management plan needed..    Tobacco use reviewed:    Elver Gordon  reports that he quit smoking about 34 years ago. His smoking use included cigarettes. He started smoking about 59 years ago. He has a 22.50 pack-year smoking history. He has never used smokeless tobacco..     Annual/wellness visit: also/today (see separate note)    Patient Instructions     Medicare/insurances offer certain visits called \"wellness/annual\" that allows for time to deal with and  review the many aspects of \"being well\" that just might not get mentioned during other visits with your doctor " through the year.  This includes things like reviews of health screenings (mammograms, various labs),  weight, exercise, vaccines for just a few examples.      In order to help you with this we wish to make you aware of a few things for you to consider:    1. Advanced directives.  These are documents used to help direct your care if your health/situation should reach a point that you cannot make your own decisions.  While it is likely you do not currently have a need for these documents now; it is something that we all might face at any time.   The hand outs you are being given today are simply for you to review and use to learn more about these documents and consider them as you wish.      2. Vaccines: Certain vaccines are important after age 50, 60, and 65 and some health situations (for example COPD), require even boosters beyond age 65.  We are happy to review with you your vaccine status and vaccines that might be needed for you at this point:      a. Tetanus.   Like anyone this needs to given every 10 years; sooner for/with lacerations/wounds.   Likely when getting this booster it needs to be a tetanus called Tdap (tetanus mixed with diptheria and pertussis).   Years ago you had this vaccine.  We now know we can lose our immunity to pertussis (a part of this vaccine) and run a risk of catching this.  Now only would this make us ill; but more importantly we can spread this to very young children (and for them it can be a much more dangerous illness).   We call this the grandparent vaccine for this reason.     b. Pneumonia (strept).   This comes now with two brands.   It is recommended to take pneumovax first; and a year later take the cousin prevnar.  Even if you have had these before; we need to review when and your current health situation/s as you may need boosters and even recently the CDC has made recent/new recommendations for pneumovax.      c. Shingles.  You do not want to catch shingles.  Though you  will recover from this; the pain associated with shingles can be severe.  Even if you have had the now older zostavax, or have had shingles; it is recommended you still get the Shingrix (the new vaccine just available early 2018 shingles vaccine).  A new shingles vaccine (a shot to lower your chance of catching shingles) is now available (shingrix).  This vaccine is the second vaccine created for this purpose; (we have had zostavax for years).  Shingrix provides a much better and longer immunity for shingles than zostavax.  For this and other reasons Shingrix can be started at age 50.  If you have had zostavax in the past; you can still take Shingrix.      This vaccine is not paid for in a doctor's office by medicare, medicaid and probably most insurances.  Like zostavax; this is covered in drug stores.  This is a vaccine that if you chose to get you need to get at a drug store that gives vaccines (like [facility], [facility] and [facility].      d. Yearly flu vaccine given from September through April each year (there is a special vaccine for those over 65).     e. Travel vaccines:  If you are one to do international travel; be sure and ask us for any particular unusual vaccines you may need.     f.  Miscellaneous:  If you have certain health situations/disease you may need specific/particular vaccines not give to the general public.     The vaccines we have on record for you include:   Immunization History   Administered Date(s) Administered   • Fluzone High Dose =>65 Years ([facility] ONLY) 09/27/2017   • Pneumococcal Conjugate 13-Valent (PCV13) 06/19/2018   • Pneumococcal, Unspecified 02/04/2007   • Zostavax 10/01/2016       If you have record of other vaccines and want them to show in your chart here; please talk to our nurses about having your vaccine record updated.     3. Exercise: regular cardio exercise something everyone should consider and try to do; even if health limitations (ie find that  exercise UE/LE/cardio that they can tolerate).   Normal weight a goal for everyone (as we discussed)    4. Healthy diet helpful for weight management, illness prevention.     5. If over 50-screening exams include men PSA/rectal exam, women mammograms, and everyone colonoscopy screening for colon cancer.    6. If you use tobacco of any kind or e-products you should stop. We are providing you some information to consider that could make this process easier.      ##################################              Follow up: Return for no stick A1c if able; then lab/Dr Lorenzo 5.2021.  Future Appointments   Date Time Provider Department Center   7/26/2021  2:50 PM Kwaku Zuniga MD [facility]   5/4/2022  8:10 AM [facility]   5/10/2022  9:00 AM Andre Lorenzo MD [facility]

## 2021-07-20 NOTE — PROGRESS NOTES
Pt here for TDAP injection as ordered. Given in L deltoid, vis given to patient and consent signed and verbal also given . Pt tolerated well and no adverse reactions noted and pt left office.

## 2021-07-22 PROBLEM — E78.5 HLD (HYPERLIPIDEMIA): Status: ACTIVE | Noted: 2021-01-01

## 2021-07-22 PROBLEM — J18.9 COMMUNITY ACQUIRED PNEUMONIA OF RIGHT LOWER LOBE OF LUNG: Status: ACTIVE | Noted: 2021-01-01

## 2021-07-22 PROBLEM — J96.01 ACUTE RESPIRATORY FAILURE WITH HYPOXIA (HCC): Status: ACTIVE | Noted: 2021-01-01

## 2021-07-22 PROBLEM — N40.0 BPH (BENIGN PROSTATIC HYPERPLASIA): Status: ACTIVE | Noted: 2021-01-01

## 2021-07-22 NOTE — H&P
HISTORY AND PHYSICAL             Date: 7/22/2021        Patient Name: Ti Carrington     YOB: 1945      Age:  68 y.o. Chief Complaint     Chief Complaint   Patient presents with    Shortness of Breath     can't get a deep breath. cough.  Chest Pain     right sided. History Obtained From   patient, spouse, electronic medical record, emergency room physician    History of Present Illness   Patient is a 51-year-old  male with a known past medical history of coronary artery disease, hypertension, hyperlipidemia, BPH, chronic lung disease recently scheduled to follow-up with lung physician in the next week, presented to the emergency room due to worsening shortness of breath. Patient states that timeframe about approximately 2 weeks, denies any sick contacts or recent travel. Patient has been having productive sputum, denies any fevers. Patient does associate right-sided chest discomfort, cough. Work-up in the emergency room revealing WBC count elevation of 19.0. Troponin negative x2, proBNP within normal limits, slight elevation of blood sugar. Chest x-ray with noted bilateral fibrotic change. Patient admitted to medical floor for ongoing work-up of dyspnea, seen on medical unit denying any headache, change in vision, nausea vomiting, fevers or chills.     Past Medical History     Past Medical History:   Diagnosis Date    Arthritis     CAD (coronary artery disease) 3/4/2021    Chronic kidney disease     enlarged prostate    Community acquired pneumonia of right lower lobe of lung 7/22/2021    HLD (hyperlipidemia) 7/22/2021    Hypertension         Past Surgical History     Past Surgical History:   Procedure Laterality Date    CHOLECYSTECTOMY      COLONOSCOPY      ENDOSCOPY, COLON, DIAGNOSTIC      HERNIA REPAIR      JOINT REPLACEMENT Bilateral     SKIN BIOPSY          Medications Prior to Admission     Prior to Admission medications    Medication Sig Start Date End Date Taking? Authorizing Provider   isosorbide mononitrate (IMDUR) 60 MG extended release tablet Take 1 tablet by mouth 2 times daily 6/1/21 7/12/21  OLIVIA Espinal   metoprolol (LOPRESSOR) 100 MG tablet Take 1 tablet by mouth 2 times daily 5/24/21   OLIVIA Espinal   nitroGLYCERIN (NITROSTAT) 0.4 MG SL tablet up to max of 3 total doses. If no relief after 1 dose, call 911. 3/6/21   Ender Padilla MD   aspirin 81 MG chewable tablet Take 1 tablet by mouth daily 11/15/20   Shanique Grossman MD   atorvastatin (LIPITOR) 40 MG tablet Take 1 tablet by mouth nightly 11/14/20   Shanique Grossman MD   clopidogrel (PLAVIX) 75 MG tablet Take 1 tablet by mouth daily 11/15/20   Shanique Grossman MD   finasteride (PROSCAR) 5 MG tablet Take 5 mg by mouth daily    Historical Provider, MD        Allergies   Patient has no known allergies.     Social History     Social History     Tobacco History     Smoking Status  Former Smoker Smoking Frequency  1.5 packs/day for 22 years (33 pk yrs) Smoking Tobacco Type  Cigarettes    Smokeless Tobacco Use  Former User Smokeless Tobacco Type  Chew          Alcohol History     Alcohol Use Status  Never          Drug Use     Drug Use Status  Never          Sexual Activity     Sexually Active  Not Currently Partners  Female                Family History     Family History   Problem Relation Age of Onset    Cancer Mother     Heart Attack Father     Heart Disease Father     High Blood Pressure Father     Arthritis Father     Cancer Brother     Substance Abuse Brother     Arthritis Brother     High Blood Pressure Brother     No Known Problems Maternal Aunt     Heart Disease Maternal Uncle     High Blood Pressure Maternal Uncle     Coronary Art Dis Maternal Uncle     No Known Problems Paternal Aunt     No Known Problems Paternal Uncle     Heart Attack Brother     Heart Disease Brother     Coronary Art Dis Brother     High Blood Pressure Brother        Review of Systems   Review of Systems   Constitutional: Positive for activity change and fatigue. Negative for appetite change, fever and unexpected weight change. HENT: Negative for sore throat and trouble swallowing. Respiratory: Positive for cough, chest tightness and shortness of breath. Negative for wheezing. Cardiovascular: Negative for palpitations and leg swelling. Gastrointestinal: Negative for abdominal pain, nausea and vomiting. Musculoskeletal: Negative for arthralgias. Skin: Negative for color change and pallor. Neurological: Positive for weakness. Negative for light-headedness and headaches. Psychiatric/Behavioral: Negative for agitation and confusion. Physical Exam   /75   Pulse 78   Temp 97.6 °F (36.4 °C) (Temporal)   Resp 18   Ht 6' 1\" (1.854 m)   Wt 194 lb (88 kg)   SpO2 94%   BMI 25.60 kg/m²     Physical Exam  Nursing note reviewed. Constitutional:       General: He is not in acute distress. Appearance: He is not ill-appearing. Comments: Fatigued in appearance   HENT:      Head: Normocephalic and atraumatic. Nose: Nose normal.   Eyes:      General:         Right eye: No discharge. Left eye: No discharge. Conjunctiva/sclera: Conjunctivae normal.   Cardiovascular:      Rate and Rhythm: Normal rate and regular rhythm. Pulmonary:      Breath sounds: Rhonchi present. No wheezing. Comments: Good airflow left-sided lung sounds, diminished airflow right lower lung base  Abdominal:      General: Bowel sounds are normal.      Tenderness: There is no abdominal tenderness. There is no guarding or rebound. Musculoskeletal:         General: Normal range of motion. Cervical back: Normal range of motion. Right lower leg: No edema. Left lower leg: No edema. Skin:     General: Skin is warm. Coloration: Skin is not pale. Neurological:      Mental Status: He is alert and oriented to person, place, and time. Mental status is at baseline. Motor: Weakness present.    Psychiatric:         Mood and Affect: Mood normal.         Labs      Recent Results (from the past 24 hour(s))   EKG 12 Lead    Collection Time: 07/22/21  5:51 AM   Result Value Ref Range    P-R Interval 152 ms    QRS Duration 88 ms    Q-T Interval 356 ms    QTc Calculation (Bazett) 408 ms    P Axis 53 degrees    T Axis 34 degrees   CBC    Collection Time: 07/22/21  6:15 AM   Result Value Ref Range    WBC 19.0 (H) 4.8 - 10.8 K/uL    RBC 5.24 4.70 - 6.10 M/uL    Hemoglobin 15.6 14.0 - 18.0 g/dL    Hematocrit 46.7 42.0 - 52.0 %    MCV 89.1 80.0 - 94.0 fL    MCH 29.8 27.0 - 31.0 pg    MCHC 33.4 33.0 - 37.0 g/dL    RDW 13.7 11.5 - 14.5 %    Platelets 310 919 - 587 K/uL    MPV 9.7 9.4 - 12.4 fL   TROPONIN    Collection Time: 07/22/21  6:15 AM   Result Value Ref Range    Troponin <0.01 0.00 - 0.03 ng/mL   Comprehensive Metabolic Panel    Collection Time: 07/22/21  6:15 AM   Result Value Ref Range    Sodium 136 136 - 145 mmol/L    Potassium 4.4 3.5 - 5.0 mmol/L    Chloride 103 98 - 111 mmol/L    CO2 20 (L) 22 - 29 mmol/L    Anion Gap 13 7 - 19 mmol/L    Glucose 152 (H) 74 - 109 mg/dL    BUN 23 8 - 23 mg/dL    CREATININE 0.9 0.5 - 1.2 mg/dL    GFR Non-African American >60 >60    GFR African American >59 >59    Calcium 9.6 8.8 - 10.2 mg/dL    Total Protein 7.4 6.6 - 8.7 g/dL    Albumin 4.2 3.5 - 5.2 g/dL    Total Bilirubin 1.4 (H) 0.2 - 1.2 mg/dL    Alkaline Phosphatase 77 40 - 130 U/L    ALT 10 5 - 41 U/L    AST 14 5 - 40 U/L   Brain Natriuretic Peptide    Collection Time: 07/22/21  6:15 AM   Result Value Ref Range    Pro- 0 - 1,800 pg/mL   D-Dimer, Quantitative    Collection Time: 07/22/21  6:15 AM   Result Value Ref Range    D-Dimer, Quant 0.33 0.00 - 0.48 ug/mL FEU   Blood Gas, Arterial    Collection Time: 07/22/21  6:37 AM   Result Value Ref Range    pH, Arterial 7.440 7.350 - 7.450    pCO2, Arterial 31.0 (L) 35.0 - 45.0 mmHg    pO2, Arterial 53.0 (L) 80.0 - 100.0 mmHg    HCO3, Arterial 21.1 (L) 22.0 - 26.0 mmol/L    Base Excess, Arterial -2.0 -2.0 - 2.0 mmol/L    Hemoglobin, Art, Extended 15.5 14.0 - 18.0 g/dL    O2 Sat, Arterial 87.8 (LL) >92 %    Carboxyhgb, Arterial 2.8 0.0 - 5.0 %    Methemoglobin, Arterial 1.0 <1.5 %    O2 Content, Arterial 19.1 Not Established mL/dL    O2 Therapy Unknown    Potassium, Whole Blood    Collection Time: 07/22/21  6:37 AM   Result Value Ref Range    Potassium, Whole Blood 4.0    Respiratory Panel, Molecular, with COVID-19 (Restricted: peds pts or suitable admitted adults)    Collection Time: 07/22/21  7:38 AM    Specimen: Nasopharyngeal   Result Value Ref Range    Adenovirus by PCR Not Detected Not Detected    Bordetella parapertussis by PCR Not Detected Not Detected    Bordetella pertussis by PCR Not Detected Not Detected    Chlamydophilia pneumoniae by PCR Not Detected Not Detected    Coronavirus 229E by PCR Not Detected Not Detected    Coronavirus HKU1 by PCR Not Detected Not Detected    Coronavirus NL63 by PCR Not Detected Not Detected    Coronavirus OC43 by PCR Not Detected Not Detected    SARS-CoV-2, PCR Not Detected Not Detected    Human Metapneumovirus by PCR Not Detected Not Detected    Human Rhinovirus/Enterovirus by PCR Not Detected Not Detected    Influenza A by PCR Not Detected Not Detected    Influenza A (no subtype) by PCR Not Detected Not Detected    Influenza A H1 by PCR Not Detected Not Detected    Influenza A H1-2009 by PCR Not Detected Not Detected    Influenza A H3 by PCR Not Detected Not Detected    Influenza B by PCR Not Detected Not Detected    Mycoplasma pneumoniae by PCR Not Detected Not Detected    Parainfluenza Virus 1 by PCR Not Detected Not Detected    Parainfluenza Virus 2 by PCR Not Detected Not Detected    Parainfluenza Virus 3 by PCR Not Detected Not Detected    Parainfluenza Virus 4 by PCR Not Detected Not Detected    Respiratory Syncytial Virus by PCR Not Detected Not Detected   Urinalysis Reflex to Culture    Collection Time: 07/22/21  8:56 AM    Specimen: Urine, clean catch   Result Value Ref Range    Color, UA YELLOW Straw/Yellow    Clarity, UA Clear Clear    Glucose, Ur Negative Negative mg/dL    Bilirubin Urine Negative Negative    Ketones, Urine TRACE (A) Negative mg/dL    Specific Gravity, UA 1.026 1.005 - 1.030    Blood, Urine Negative Negative    pH, UA 5.5 5.0 - 8.0    Protein, UA TRACE (A) Negative mg/dL    Urobilinogen, Urine 0.2 <2.0 E.U./dL    Nitrite, Urine Negative Negative    Leukocyte Esterase, Urine Negative Negative   Troponin    Collection Time: 07/22/21 10:52 AM   Result Value Ref Range    Troponin <0.01 0.00 - 0.03 ng/mL        Imaging/Diagnostics Last 24 Hours   XR CHEST PORTABLE    Result Date: 7/22/2021  XR CHEST PORTABLE 7/22/2021 6:34 AM HISTORY: Chest pain COMPARISON: Chest exam dated 3/4/2021. FINDINGS: Underlying fibrotic changes in both lungs, especially in the lung periphery as well as overlying the right hemidiaphragm. No acute infiltrate is seen. No pleural effusion or pneumothorax. Heart size is stable. Right hilar contour remains somewhat prominent although this is stable. No acute bony abnormality. 1. Stable chest exam, with bilateral fibrotic changes identified. No acute infiltrate or evidence of pulmonary edema.  Signed by Dr Aaliyah Bledsoe    * (Principal) Acute respiratory failure with hypoxia ([facility] [address] 7/22/2021 Yes    CAD (coronary artery disease) 7/22/2021 Yes    Hypertension 7/22/2021 Yes    HLD (hyperlipidemia) 7/22/2021 Yes    BPH (benign prostatic hyperplasia) 7/22/2021 Yes    Community acquired pneumonia of right lower lobe of lung 7/22/2021 Yes          Plan     Acute respiratory failure with hypoxia/community-acquired bacterial pneumonia of the right lung  -ABG has been reviewed, CT chest reviewed  -Continue with ceftriaxone/azithromycin  -Covid screening not detected, respiratory panel not detected  -Follow-up blood cultures, pro calcitonin  -Nebulized modalities  -Continue to maintain O2 sats greater than 90%  -Incentive spirometry  -Tylenol as needed for pain or temperature elevation greater than 100.4  -Up to chair activity  -Patient will need home O2 evaluation prior to discharge      Essential hypertension-chronic condition, continue antihypertensive regimen, routine vitals    Coronary artery disease-chronic condition, continue with Plavix/aspirin/statin therapy/Imdur    BPH-chronic condition, continue with home modality, monitor patient's I/os    Consultations Ordered:  None        EMR Dragon/Transcription disclaimer:   Much of this encounter note is an electronic transcription/translation of spoken language to printed text.  The electronic translation of spoken language may permit erroneous, or at times, nonsensical words or phrases to be inadvertently transcribed; although attempts have made to review the note for such errors, some may still exist.    Electronically signed by   Nichole Torres MD   Internal Medicine Hospitalist  On 7/22/2021  At 12:06 PM

## 2021-07-22 NOTE — ED PROVIDER NOTES
[facility] EMERGENCY DEPT  eMERGENCY dEPARTMENT eNCOUnter      Pt Name: Ti Carrington  MRN: [id number]  [place] 1945  Date of evaluation: 7/22/2021  Provider: Marie Valerio MD    CHIEF COMPLAINT       Chief Complaint   Patient presents with    Shortness of Breath     can't get a deep breath. cough.  Chest Pain     right sided. HISTORY OF PRESENT ILLNESS   (Location/Symptom, Timing/Onset,Context/Setting, Quality, Duration, Modifying Factors, Severity)  Note limiting factors. Ti Carrington is a 68 y.o. male who presents to the emergency department with shortness of breath. Patient states shortness of breath started \"2 years ago and gradually got worse. \"  \"Yesterday was horrible and I could not sleep last night. I woke at 2:15 AM gasping for air. \"  Patient has also been experiencing right lower chest pain. \"My oxygen saturations have been low. \"  Patient wears a pulse oximeter at home and has noticed \"it is in the 80s most of the time. \"  Patient recently had a cardiac catheterization and states that \"2 vessels are clogged with her 2 small to get in a stent. \"  Patient states he was nauseated yesterday without vomiting. Patient denies sweatiness or left-sided chest pain. Patient states he has had no lower extremity swelling or pain. HPI    NursingNotes were reviewed. REVIEW OF SYSTEMS    (2-9 systems for level 4, 10 or more for level 5)     Review of Systems   Constitutional: Negative for chills, diaphoresis and fever. HENT: Negative for congestion and rhinorrhea. Eyes: Negative for discharge. Respiratory: Positive for cough (Clear sputum) and shortness of breath. Cardiovascular: Positive for chest pain. Negative for palpitations and leg swelling. Gastrointestinal: Positive for nausea. Negative for abdominal pain and vomiting. Genitourinary: Negative for difficulty urinating and dysuria. Musculoskeletal: Negative for back pain and neck pain.    Skin: Negative for color change and pallor. Neurological: Positive for dizziness. Negative for syncope and light-headedness. Psychiatric/Behavioral: Negative for agitation and confusion. All other systems reviewed and are negative. PAST MEDICALHISTORY     Past Medical History:   Diagnosis Date    Arthritis     CAD (coronary artery disease) 3/4/2021    Chronic kidney disease     enlarged prostate    Hypertension          SURGICAL HISTORY       Past Surgical History:   Procedure Laterality Date    CHOLECYSTECTOMY      COLONOSCOPY      ENDOSCOPY, COLON, DIAGNOSTIC      HERNIA REPAIR      JOINT REPLACEMENT Bilateral     SKIN BIOPSY           CURRENT MEDICATIONS     Previous Medications    ASPIRIN 81 MG CHEWABLE TABLET    Take 1 tablet by mouth daily    ATORVASTATIN (LIPITOR) 40 MG TABLET    Take 1 tablet by mouth nightly    CLOPIDOGREL (PLAVIX) 75 MG TABLET    Take 1 tablet by mouth daily    FINASTERIDE (PROSCAR) 5 MG TABLET    Take 5 mg by mouth daily    ISOSORBIDE MONONITRATE (IMDUR) 60 MG EXTENDED RELEASE TABLET    Take 1 tablet by mouth 2 times daily    METOPROLOL (LOPRESSOR) 100 MG TABLET    Take 1 tablet by mouth 2 times daily    NITROGLYCERIN (NITROSTAT) 0.4 MG SL TABLET    up to max of 3 total doses. If no relief after 1 dose, call 911. ALLERGIES     Patient has no known allergies.     FAMILY HISTORY       Family History   Problem Relation Age of Onset    Cancer Mother     Heart Attack Father     Heart Disease Father     High Blood Pressure Father     Arthritis Father     Cancer Brother     Substance Abuse Brother     Arthritis Brother     High Blood Pressure Brother     No Known Problems Maternal Aunt     Heart Disease Maternal Uncle     High Blood Pressure Maternal Uncle     Coronary Art Dis Maternal Uncle     No Known Problems Paternal Aunt     No Known Problems Paternal Uncle     Heart Attack Brother     Heart Disease Brother     Coronary Art Dis Brother     High Blood Pressure Brother SOCIAL HISTORY       Social History     Socioeconomic History    Marital status:      Spouse name: Pop Kaplan Number of children: 3    Years of education: 15    Highest education level: Not on file   Occupational History    Not on file   Tobacco Use    Smoking status: Former Smoker     Packs/day: 1.50     Years: 22.00     Pack years: 33.00     Types: Cigarettes    Smokeless tobacco: Former User     Types: Chew   Vaping Use    Vaping Use: Never used   Substance and Sexual Activity    Alcohol use: Never    Drug use: Never    Sexual activity: Not Currently     Partners: Female   Other Topics Concern    Not on file   Social History Narrative    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[employer]     48 years only marriage    He has 1 daughter and 2 sons    Education high school 1 year college    Previously [employer] active duty 3 years   1967 he has gone to the [employer] for healthcare in the past    He enjoys shooting sports    Smoked until age 43 packs/day denies alcohol consumption or substance usage     Social Determinants of Health     Financial Resource Strain:     Difficulty of Paying Living Expenses:    Food Insecurity:     Worried About Running Out of Food in the Last Year:     [address] in the Last Year:    Transportation Needs:     Lack of Transportation (Medical):      Lack of Transportation (Non-Medical):    Physical Activity:     Days of Exercise per Week:     Minutes of Exercise per Session:    Stress:     Feeling of Stress :    Social Connections:     Frequency of Communication with Friends and Family:     Frequency of Social Gatherings with Friends and Family:     Attends Yarsanism Services:     Active Member of Clubs or Organizations:     Attends Club or Organization Meetings:     Marital Status:    Intimate Partner Violence:     Fear of Current or Ex-Partner:     Emotionally Abused:     Physically Abused:     Sexually Abused:        SCREENINGS             PHYSICAL EXAM    (up to 7 for level 4, 8 or more for level 5)     ED Triage Vitals [07/22/21 0552]   BP Temp Temp src Pulse Resp SpO2 Height Weight   99/67 97.1 °F (36.2 °C) -- 86 26 (!) 86 % 6' 1\" (1.854 m) 194 lb (88 kg)       Physical Exam  Vitals and nursing note reviewed. Constitutional:       General: He is not in acute distress. Comments: Patient is mildly tachypneic. Saturations are 86% on room air. HENT:      Head: Normocephalic and atraumatic. Right Ear: External ear normal.      Left Ear: External ear normal.      Nose: Nose normal.      Mouth/Throat:      Mouth: Mucous membranes are moist.      Pharynx: Oropharynx is clear. No oropharyngeal exudate. Eyes:      General: No scleral icterus. Conjunctiva/sclera: Conjunctivae normal.      Pupils: Pupils are equal, round, and reactive to light. Cardiovascular:      Rate and Rhythm: Normal rate and regular rhythm. Pulses: Normal pulses. Heart sounds: Normal heart sounds. Pulmonary:      Effort: Pulmonary effort is normal.      Breath sounds: Normal breath sounds. Comments: Bilateral crackles consistent with fibrosis. Few wheezes bilaterally. Increased effort of breathing with accessory muscle use without retractions or nasal flaring. Abdominal:      General: Bowel sounds are normal.      Palpations: Abdomen is soft. Tenderness: There is no abdominal tenderness. There is no guarding. Musculoskeletal:         General: No tenderness or deformity. Cervical back: Neck supple. No rigidity. Right lower leg: No edema. Left lower leg: No edema. Skin:     General: Skin is warm and dry. Capillary Refill: Capillary refill takes less than 2 seconds. Coloration: Skin is not jaundiced. Neurological:      General: No focal deficit present. Mental Status: He is alert and oriented to person, place, and time. Mental status is at baseline. Coordination: Coordination normal.   Psychiatric:         Mood and Affect: Mood normal.         Behavior: Behavior normal.         DIAGNOSTIC RESULTS     EKG: All EKG's areinterpreted by the Emergency Department Physician who either signs or Co-signs this chart in the absence of a cardiologist.    EKG dated 7/22/2021 at 0 5:51 AM: Normal sinus rhythm, rate 90. Normal EKG. RADIOLOGY:  Non-plain film images such as CT, Ultrasound and MRI are read by the radiologist. Plain radiographic images are visualized and preliminarily interpreted bythe emergency physician with the below findings:      XR CHEST PORTABLE   Final Result   1. Stable chest exam, with bilateral fibrotic changes identified. No   acute infiltrate or evidence of pulmonary edema.    Signed by Dr Catrachito Travis    (Results Pending)           LABS:  Labs Reviewed   CBC - Abnormal; Notable for the following components:       Result Value    WBC 19.0 (*)     All other components within normal limits   COMPREHENSIVE METABOLIC PANEL - Abnormal; Notable for the following components:    CO2 20 (*)     Glucose 152 (*)     Total Bilirubin 1.4 (*)     All other components within normal limits   BLOOD GAS, ARTERIAL - Abnormal; Notable for the following components:    pCO2, Arterial 31.0 (*)     pO2, Arterial 53.0 (*)     HCO3, Arterial 21.1 (*)     O2 Sat, Arterial 87.8 (*)     All other components within normal limits    Narrative:     Quinten Mckeon tel. ,  Taz Leavitt MD ER, 07/22/2021 06:39, by Bethany Younger   URINE RT REFLEX TO CULTURE - Abnormal; Notable for the following components:    Ketones, Urine TRACE (*)     Protein, UA TRACE (*)     All other components within normal limits   RESPIRATORY PANEL, MOLECULAR, WITH COVID-19   COVID-19, RAPID   TROPONIN   POTASSIUM, WHOLE BLOOD   BRAIN NATRIURETIC PEPTIDE   D-DIMER, QUANTITATIVE   TROPONIN   TROPONIN       All other labs were within normal range or not returned as of this dictation. EMERGENCY DEPARTMENT COURSE and DIFFERENTIAL DIAGNOSIS/MDM:   Vitals:    Vitals:    07/22/21 0552 07/22/21 0845 07/22/21 0923   BP: 99/67  105/72   Pulse: 86  76   Resp: 26 12 25   Temp: 97.1 °F (36.2 °C)     SpO2: (!) 86% 94% 94%   Weight: 194 lb (88 kg)     Height: 6' 1\" (1.854 m)         MDM  41-year-old male presents with shortness of breath and low oxygen saturation. Lab, EKG, and radiology results reviewed. Discussed with Dr. Odalis Beverly, hospitalist, who will admit patient for further evaluation and treatment. CONSULTS:  None    PROCEDURES:  Unless otherwise noted below, none     Procedures    FINAL IMPRESSION      1. Hypoxia    2.  COPD exacerbation [facility])          DISPOSITION/PLAN   DISPOSITION Admitted 07/22/2021 08:37:05 AM           (Please note that portions of this note were completed with a voice recognition program.  Efforts were made to edit thedictations but occasionally words are mis-transcribed.)    Altagracia Sifuentes MD (electronically signed)  Attending Emergency Physician          Altagracia Sifuentes MD  07/22/21 0972

## 2021-07-22 NOTE — PROGRESS NOTES
Contains critical data Blood Gas, Arterial  Status:  Final result   Ref Range & Units 07/22/21 0637   pH, Arterial 7.350 - 7.450 7.440    pCO2, Arterial 35.0 - 45.0 mmHg 31. 0Low     pO2, Arterial 80.0 - 100.0 mmHg 53. 0Low     HCO3, Arterial 22.0 - 26.0 mmol/L 21.1Low     Base Excess, Arterial -2.0 - 2.0 mmol/L -2.0    Hemoglobin, Art, Extended 14.0 - 18.0 g/dL 15.5    O2 Sat, Arterial >92 % 87.8Low Panic     Carboxyhgb, Arterial 0.0 - 5.0 % 2.8    Comment:      0.0-1.5   (Smokers 1.5-5.0)    Methemoglobin, Arterial <1.5 % 1.0    O2 Content, Arterial Not Established mL/dL 19.1    O2 Therapy  Unknown    Resulting Agency  [facility]   Narrative    CALL  Wright Ludivina Vazquez MD ER, 07/22/2021 06:39, by TORIE        Specimen Collected: 07/22/21 0637 Last Resulted: 07/22/21 3453 View Other Order Details        Pt on room air, RR 22 site RR at+

## 2021-07-23 PROBLEM — J84.10 PULMONARY FIBROSIS (HCC): Status: ACTIVE | Noted: 2021-01-01

## 2021-07-23 PROBLEM — R93.89 ABNORMAL X-RAY: Status: ACTIVE | Noted: 2021-01-01

## 2021-07-23 NOTE — PROGRESS NOTES
BLOOD GAS, ARTERIAL [[id number]] (Abnormal) Collected: 07/23/21 0705     Specimen: Blood gases Updated: 07/23/21 0706      pH, Arterial 7.440      pCO2, Arterial 34.0 mmHg       pO2, Arterial 60.0 mmHg       HCO3, Arterial 23.1 mmol/L       Base Excess, Arterial -0.4 mmol/L       Hemoglobin, Art, Extended 15.0 g/dL       O2 Sat, Arterial 91.1 %       Carboxyhgb, Arterial 2.4 %       Methemoglobin, Arterial 1.2 %       O2 Content, Arterial 19.2 mL/dL       O2 Therapy Unknown     Potassium, Whole Blood [[id number]] Collected: 07/23/21 0705      Updated: 07/23/21 0706      Potassium, Whole Blood 3.7     AT+, O2 @ 2 lpm (NC), RR = 20, R rad

## 2021-07-23 NOTE — PROGRESS NOTES
Progress Note  Date:2021       Room:0410/410-02  Patient Name:Hermilo Manriquez     YOB: 1945     Age:76 y.o. Subjective    Subjective   Patient seen and examined this a.m. spouse present at the bedside. Patient states he was able to walk, pagan grab coffee of which she could not complete yesterday. Did state his oxygen dropped to the upper 80s. No adverse events overnight. Continues with cough. Denies nausea vomiting, fevers or chills. Cumulative hospital course: Patient was admitted 922,  30-year-old  male with a known past medical history of coronary artery disease, hypertension, hyperlipidemia, BPH, chronic lung disease presented to the emergency room due to worsening shortness of breath. Noted increased sputum production, low oxygen saturations. Upon presentation noted elevated WBC count, noted right-sided chest discomfort, troponin trend negative. proBNP within normal limits. Chest x-ray noted for bilateral fibrotic changes, CT of the chest revealed right lobe pneumonia. Patient continues on antibiotic therapy, blood cultures in process. Continues on steroid dosing. Anticipation of discharge home in the a.m. depending on clinical status, will assess need for home O2. Review of Systems   ROS: 14 point review of systems is negative except as specifically addressed above. Objective         Vitals Last 24 Hours:  TEMPERATURE:  Temp  Av.4 °F (35.8 °C)  Min: 96.2 °F (35.7 °C)  Max: 96.7 °F (35.9 °C)  RESPIRATIONS RANGE: Resp  Av.8  Min: 17  Max: 22  PULSE OXIMETRY RANGE: SpO2  Av.8 %  Min: 90 %  Max: 93 %  PULSE RANGE: Pulse  Av  Min: 72  Max: 85  BLOOD PRESSURE RANGE: Systolic (63RGU), GLD:828 , Min:114 , MIS:052   ; Diastolic (44GKD), TLR:22, Min:76, Max:84    I/O (24Hr):     Intake/Output Summary (Last 24 hours) at 2021 1128  Last data filed at 2021 0951  Gross per 24 hour   Intake 1020 ml   Output --   Net 1020 ml     Physical were acquired. Bone and soft tissue algorithms were provided. Coronal reformatted images were also provided for review. Automated exposure control was also utilized to decrease patient radiation dose. FINDINGS: Neck base: The imaged portion of the neck and thyroid gland is unremarkable. Lungs: Underlying advanced pulmonary fibrosis with bilateral subpleural reticulation, traction bronchiectasis and honeycombing. UIP pattern. There are large pneumatoceles in both lung bases. There is a superimposed area of consolidation with air bronchograms in the right base. An air-fluid level is seen within the dominant right base pneumatocele. No pleural effusion is seen. The airways are clear. No pneumothorax. Heart: The heart is normal in size. There is no pericardial effusion. Moderate coronary artery atheromatous calcification. Vessels: Thoracic aorta is normal in caliber. Central pulmonary arteries are normal in caliber. Lymph nodes: Right hilar, subcarinal and right level 4 mediastinal adenopathy is seen. Skeletal and soft tissues: The osseous structures of the thorax and surrounding soft tissues demonstrate no worrisome lesions or acute process. Moderate thoracic spine degenerative change. Upper abdomen: The imaged portion of the upper abdomen demonstrates no acute process. Prior cholecystectomy. 1. Advanced pulmonary fibrosis, UIP pattern, with the basal predominant honeycombing, subpleural reticulation and traction bronchiectasis. 2. Superimposed consolidation with air bronchograms in the right lower lobe, the appearance suggesting pneumonia. 3. There is a right hilar, level 7 and right level 4 mediastinal adenopathy which is favored to be reactive. Short interval follow-up CT is recommended to ensure resolution of this basilar consolidation and adenopathy. 4. Coronary artery atheromatous calcification.  Signed by Dr J Luis Neil    XR CHEST PORTABLE    Result Date: 7/22/2021  XR CHEST PORTABLE 7/22/2021 6:34 AM this encounter note is an electronic transcription/translation of spoken language to printed text.  The electronic translation of spoken language may permit erroneous, or at times, nonsensical words or phrases to be inadvertently transcribed; although attempts have made to review the note for such errors, some may still exist.    Electronically signed by   Tatiana Knight MD   Internal Medicine Hospitalist  On 7/23/2021  At 11:31 AM

## 2021-07-23 NOTE — PROGRESS NOTES
12:38p - Home oxygen evaluation performed and results are as follows: SaO2 = 88% on R/A at rest, SaO2 - 92% on 2 lpm O2(NC), SaO2 = 82% on R/A while ambulating, SaO2 = 91% on 2 lpm O2(NC) while ambulating with 4 minute  recovery time (Recovery SaO2).  TS RRT/RCP

## 2021-07-23 NOTE — CARE COORDINATION
CM Initial Assessment   Met with pt, spouse at bedside, to assess dc plans/needs. The following information has been reviewed and confirmed: Address:  [address] [address]    Phone number:   [phone] (home)       Pt reports that he lives at home with his spouse. He is independent at baseline and drives. He plans to return back home at [place]. He reports his medications as affordable. He has had Home Health in the past following 2 knee surgeries. He is not interested in their services at [place]. He is currently requiring supplemental oxygen, was not on home o2 pta. He would like this supplied by [facility] if needed at [place]. [facility]   P  [phone] [id number]  He has a pulse ox that he uses at home to keep watch on his o2 sats as well as a blood pressure cuff. He denies any further needs at this time.    Electronically signed by Brie Sumner on 7/23/2021 at 10:28 AM

## 2021-07-24 NOTE — DISCHARGE SUMMARY
Discharge Summary  Date:7/24/2021        Patient Name:Hermilo Manriquez     YOB: 1945     Age:76 y.o. Admit Date:7/22/2021   Admission Condition:poor   Discharged Condition:good  Discharge Date: 07/24/21     Discharge Diagnoses   Principal Problem:    Acute respiratory failure with hypoxia ([facility]  Active Problems:    CAD (coronary artery disease)    Hypertension    HLD (hyperlipidemia)    BPH (benign prostatic hyperplasia)    Community acquired pneumonia of right lower lobe of lung    Pulmonary fibrosis ([facility]  Resolved Problems:    * No resolved hospital problems. [facility] Stay   Narrative of Hospital Course: Patient was admitted 922,  68-year-old  male with a known past medical history of coronary artery disease, hypertension, hyperlipidemia, BPH, chronic lung disease presented to the emergency room due to worsening shortness of breath. Noted increased sputum production, low oxygen saturations. Upon presentation noted elevated WBC count, noted right-sided chest discomfort, troponin trend negative. proBNP within normal limits. Chest x-ray noted for bilateral fibrotic changes, CT of the chest revealed right lobe pneumonia. Patient was placed on antibiotic therapy with ceftriaxone/azithromycin while inpatient, blood cultures with no growth to date. Patient qualified for home oxygen, DME was provided and delivered to the room prior to patient's discharge. Patient will be discharged on tapered dose of prednisone therapy, patient has established an appointment with a local pulmonologist scheduled for August.  Education modalities are attached to the after visit summary. Patient will complete cefdinir/azithromycin antibiotic course upon discharge. Patient is to follow-up with primary care provider in the next 1 to 2 weeks for hospital discharge as well as ongoing coordination of care.     Consultants:   None    Time Spent on Discharge:  45  minutes were spent in patient examination, evaluation, counseling as well as medication reconciliation, prescriptions for required medications, discharge plan and follow up.       Surgeries/Procedures Performed:       Treatments:   antibiotics: ceftriaxone and azithromycin, analgesia: acetaminophen, cardiac meds: Lipitor, Imdur, Lopressor, nitroglycerin as needed, anticoagulation: LMW heparin, steroids: Solu-Medrol, respiratory therapy: O2 nebulized therapy, and lecture light monitoring and stabilization    Significant Diagnostic Studies:   Recent Labs:  CBC:   Lab Results   Component Value Date    WBC 23.7 07/24/2021    RBC 4.86 07/24/2021    HGB 14.7 07/24/2021    HCT 43.8 07/24/2021    MCV 90.1 07/24/2021    MCH 30.2 07/24/2021    MCHC 33.6 07/24/2021    RDW 13.7 07/24/2021     07/24/2021     BMP:    Lab Results   Component Value Date    GLUCOSE 144 07/24/2021     07/24/2021    K 4.5 07/24/2021    K 4.7 03/04/2021     07/24/2021    CO2 24 07/24/2021    ANIONGAP 13 07/24/2021    BUN 23 07/24/2021    CREATININE 0.7 07/24/2021    CALCIUM 9.3 07/24/2021    LABGLOM >60 07/24/2021    GFRAA >59 07/24/2021     HFP:    Lab Results   Component Value Date    PROT 6.9 07/24/2021     CMP:    Lab Results   Component Value Date    GLUCOSE 144 07/24/2021     07/24/2021    K 4.5 07/24/2021    K 4.7 03/04/2021     07/24/2021    CO2 24 07/24/2021    BUN 23 07/24/2021    CREATININE 0.7 07/24/2021    ANIONGAP 13 07/24/2021    ALKPHOS 76 07/24/2021    ALT 13 07/24/2021    AST 15 07/24/2021    BILITOT 0.4 07/24/2021    LABALBU 3.5 07/24/2021    LABGLOM >60 07/24/2021    GFRAA >59 07/24/2021    PROT 6.9 07/24/2021    CALCIUM 9.3 07/24/2021     PT/INR:  No results found for: PTINR, PROTIME, INR  PTT:   Lab Results   Component Value Date    APTT 70.4 11/14/2020     FLP:    Lab Results   Component Value Date    CHOL 80 03/05/2021    TRIG 79 03/05/2021    HDL 26 03/05/2021     U/A:    Lab Results   Component Value Date    COLORU YELLOW 07/22/2021 SPECGRAV 1.026 07/22/2021    PHUR 5.5 07/22/2021    PROTEINU TRACE 07/22/2021    GLUCOSEU Negative 07/22/2021    KETUA TRACE 07/22/2021    BILIRUBINUR Negative 07/22/2021    UROBILINOGEN 0.2 07/22/2021    NITRU Negative 07/22/2021    LEUKOCYTESUR Negative 07/22/2021     TSH:  No results found for: TSH    Radiology Last 7 Days:  CT CHEST WO CONTRAST    Result Date: 7/22/2021  1. Advanced pulmonary fibrosis, UIP pattern, with the basal predominant honeycombing, subpleural reticulation and traction bronchiectasis. 2. Superimposed consolidation with air bronchograms in the right lower lobe, the appearance suggesting pneumonia. 3. There is a right hilar, level 7 and right level 4 mediastinal adenopathy which is favored to be reactive. Short interval follow-up CT is recommended to ensure resolution of this basilar consolidation and adenopathy. 4. Coronary artery atheromatous calcification. Signed by Dr Rosa Shelby    XR CHEST PORTABLE    Result Date: 7/22/2021  1. Stable chest exam, with bilateral fibrotic changes identified. No acute infiltrate or evidence of pulmonary edema. Signed by Dr Blanche Sanders and nursing note reviewed. Constitutional:       General: He is not in acute distress. Appearance: He is not toxic-appearing. HENT:      Head: Normocephalic and atraumatic. Nose: Nose normal.   Eyes:      General:         Right eye: No discharge. Left eye: No discharge. Conjunctiva/sclera: Conjunctivae normal.   Cardiovascular:      Rate and Rhythm: Normal rate and regular rhythm. Pulmonary:      Effort: Pulmonary effort is normal.      Comments: Improved air entry upon auscultation bilaterally, continues on 2 L nasal cannula oxygen  Abdominal:      General: Bowel sounds are normal.      Tenderness: There is no guarding or rebound. Musculoskeletal:      Cervical back: Normal range of motion. Right lower leg: No edema. Left lower leg: No edema.    Skin: General: Skin is warm. Neurological:      Mental Status: He is alert. Mental status is at baseline. Psychiatric:         Mood and Affect: Mood normal.           Discharge Plan   Disposition: Home    Provider Follow-Up:   No follow-up provider specified. Patient Instructions   Diet: cardiac diet    Activity: activity as tolerated      Discharge Medications         Medication List      START taking these medications    azithromycin 500 MG tablet  Commonly known as: ZITHROMAX  Take 1 tablet by mouth daily for 3 days  Start taking on: July 25, 2021     cefdinir 300 MG capsule  Commonly known as: OMNICEF  Take 1 capsule by mouth 2 times daily for 8 days     predniSONE 10 MG tablet  Commonly known as: DELTASONE  Take 4 tablets by mouth daily for 5 days, THEN 2 tablets daily for 5 days, THEN 1 tablet daily for 5 days, THEN 0.5 tablets daily for 5 days. Start taking on: July 24, 2021        CONTINUE taking these medications    aspirin 81 MG chewable tablet  Take 1 tablet by mouth daily     atorvastatin 40 MG tablet  Commonly known as: LIPITOR  Take 1 tablet by mouth nightly     clopidogrel 75 MG tablet  Commonly known as: PLAVIX  Take 1 tablet by mouth daily     finasteride 5 MG tablet  Commonly known as: PROSCAR     isosorbide mononitrate 60 MG extended release tablet  Commonly known as: IMDUR  Take 1 tablet by mouth 2 times daily     metoprolol 100 MG tablet  Commonly known as: LOPRESSOR  Take 1 tablet by mouth 2 times daily     nitroGLYCERIN 0.4 MG SL tablet  Commonly known as: NITROSTAT  up to max of 3 total doses. If no relief after 1 dose, call 911.            Where to Get Your Medications      These medications were sent to [facility] [address], [address]    Phone: [phone]   · azithromycin 500 MG tablet  · cefdinir 300 MG capsule     You can get these medications from any pharmacy    Bring a paper prescription for each of these medications  · predniSONE 10 MG tablet         EMR Dragon/Transcription disclaimer:   Much of this encounter note is an electronic transcription/translation of spoken language to printed text.  The electronic translation of spoken language may permit erroneous, or at times, nonsensical words or phrases to be inadvertently transcribed; although attempts have made to review the note for such errors, some may still exist.    Electronically signed by   Ken Woody MD   Internal Medicine Hospitalist  On 7/24/2021  At 10:15 AM

## 2021-08-03 PROBLEM — R06.09 DOE (DYSPNEA ON EXERTION): Status: ACTIVE | Noted: 2021-01-01

## 2021-08-03 PROBLEM — J43.9 LUNG BULLAE (HCC): Status: ACTIVE | Noted: 2021-01-01

## 2021-08-03 PROBLEM — R06.2 WHEEZING: Status: ACTIVE | Noted: 2021-01-01

## 2021-08-03 NOTE — PROGRESS NOTES
Pulmonary and Sleep Medicine    Fanta Duran (:  1945) is a 68 y.o. male,New patient, here for evaluation of the following chief complaint(s):  New Patient (COPD)      ASSESSMENT/PLAN:  1. Pulmonary fibrosis ([address]  -     DORIS Screen with Reflex; Future  -     CBC Auto Differential; Future  -     Comprehensive Metabolic Panel; Future  -     Rheumatoid Factor; Future  -     Sedimentation Rate; Future  -     Alpha-1-Antitrypsin; Future  -     CT CHEST WO CONTRAST; Future  2. Lung bullae ([address]  -     CT CHEST WO CONTRAST; Future  3. BENEDICT (dyspnea on exertion)  4. Wheezing        Pulmonary function study done at [facility] as above does not show evidence of a small airway obstruction. He does not feel inhalers help him. Continue oxygen supplementation as necessary to assure adequate oxygen saturation. 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 He might benefit from is a 5 valve for lung reduction. Will discuss with [facility] staff. The CT shows also end-stage fibrotic changes on honeycombing. Likely environmental etiology through his work versus IPF. We will get autoimmune disorder work-up in addition to alpha-1 antitrypsin level. Follow-up after the above. Discussed with Dr. Ila Babinski at [facility]. He recommends follow-up CT in 6 weeks to assess for improvement. Monique Morley MD, [facility], [facility]    Return in about 4 weeks (around 2021). SUBJECTIVE/OBJECTIVE:  The patient is here for evaluation of hypoxemia and pulmonary fibrosis. He was recently hospitalized due to increased shortness of breath and feeling dizzy. He was found to have community-acquired pneumonia for which she was treated with antibiotics. He was also found to be hypoxic and was started on oxygen. He continued to be on oxygen after his discharge. He is on oxygen today during his interview.   He had a CT of the chest done during his hospitalization that showed bilateral lower polydipsia. Genitourinary: Negative for difficulty urinating, dysuria, enuresis and flank pain. Musculoskeletal: Negative for arthralgias, back pain and gait problem. Allergic/Immunologic: Negative for environmental allergies. Neurological: Negative for dizziness, facial asymmetry, light-headedness and headaches. Vitals:    08/03/21 1030   BP: 138/80   Pulse: 68   Resp: 16   Temp: 96.8 °F (36 °C)   SpO2: 94%     Physical Exam  Vitals reviewed. Constitutional:       Appearance: Normal appearance. HENT:      Head: Normocephalic and atraumatic. Nose: Nose normal.   Eyes:      Extraocular Movements: Extraocular movements intact. Conjunctiva/sclera: Conjunctivae normal.   Cardiovascular:      Rate and Rhythm: Normal rate and regular rhythm. Heart sounds: No murmur heard. No friction rub. Pulmonary:      Effort: Pulmonary effort is normal. No respiratory distress. Breath sounds: No stridor. Wheezing present. No rhonchi or rales. Abdominal:      General: There is no distension. Palpations: There is no mass. Tenderness: There is no abdominal tenderness. There is no guarding or rebound. Musculoskeletal:      Cervical back: Normal range of motion and neck supple. Neurological:      Mental Status: He is alert and oriented to person, place, and time. This note was generated used a voice recognition software. Errors in voice recognition may have occurred. An electronic signature was used to authenticate this note.     --Glo Mendieta MD

## 2021-08-04 NOTE — PROGRESS NOTES
Physician Progress Note      Krishan Olivas  CSN #:                  [id number]  :                       1945  ADMIT DATE:       2021 6:08 AM  DISCH DATE:        2021 12:14 PM  RESPONDING  PROVIDER #:        Gay Suazo MD          QUERY TEXT:    Pt admitted with Pneumonia. Pt noted to have elevated WBC at 19 before   steroids and Temp 96.7, . If possible, please document in the progress notes   and discharge summary if you are evaluating and /or treating any of the   following: The medical record reflects the following:  Risk Factors: Pneumonia, Leukocytosis  Clinical Indicators: WBC 19, Temp 96.7, Sat 86%, RR 26  Treatment: Rocephin discharged on Omnicef x 8 days, Azithromycin x 3 days. Thank you    Bob Sorto RN, BSN, [address] [place] [address]  Thank you    Bob Sorto RN, BSN, [facility]  [phone]  Options provided:  -- Sepsis, present on admission  -- Sepsis, present on admission, now resolved  -- Sepsis, not present on admission  -- pneumonia without Sepsis  -- Sepsis was ruled out  -- Other - I will add my own diagnosis  -- Disagree - Not applicable / Not valid  -- Disagree - Clinically unable to determine / Unknown  -- Refer to Clinical Documentation Reviewer    PROVIDER RESPONSE TEXT:    This patient has sepsis that was not present on admission.     Query created by: Jose Hays on 2021 10:35 AM      Electronically signed by:  Gay Suazo MD 2021 1:16 PM

## 2021-08-06 NOTE — TELEPHONE ENCOUNTER
Patients wife called stating [facility] didn't have rx for imdur. I advised we sent Rx in on 8/3/21. I call [facility] and they did receive Rx they wanted to clarify dose since last dose was 30 mg QD. Advised it was increased in the hospital on 3/5/21 to 60 mg BID. Pharmacist voiced understanding.

## 2021-09-15 NOTE — TELEPHONE ENCOUNTER
Rx Refill Note  Requested Prescriptions     Pending Prescriptions Disp Refills   • clopidogrel (PLAVIX) 75 MG tablet [Pharmacy Med Name: Clopidogrel Bisulfate 75 MG Oral Tablet] 90 tablet 3     Sig: TAKE 1 TABLET BY MOUTH  DAILY      Last office visit with prescribing clinician: 7/20/2021      Next office visit with prescribing clinician: 5/10/2022            Marilynn Carcamo MA  09/15/21, 09:15 CDT

## 2021-09-28 PROBLEM — J96.11 CHRONIC RESPIRATORY FAILURE WITH HYPOXIA (HCC): Status: ACTIVE | Noted: 2021-01-01

## 2021-09-28 NOTE — PROGRESS NOTES
Pulmonary and Sleep Medicine    Mo Jones (:  1945) is a 68 y.o. male,Established patient, here for evaluation of the following chief complaint(s):  Follow-up (Pt is here with a 8 week follow up. Has cough and also has trouble breathing. Oxygen level staying low)      ASSESSMENT/PLAN:  1. Pulmonary fibrosis (HCC)  2. Lung bullae (HCC)  3. BENEDICT (dyspnea on exertion)  4. Wheezing  5. Chronic respiratory failure with hypoxia (HCC)        Discussed starting on Ofev for diagnosis of idiopathic pulmonary fibrosis he is in agreement. He will be candidate for pulmonary rehab. 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 He would like to explore surgical options. Will refer to [facility] thoracic surgery for evaluation. Continue oxygen supplementation. Jessica Graham MD, [facility], [facility]    No follow-ups on file. SUBJECTIVE/OBJECTIVE:  He is here for follow up on pulm fibrosis and giant bullous disease. Had a CT of the chest that showed improvement in the right lower lobe infiltrate. 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 His autoimmune work-up was negative. Alpha-1 antitrypsin level is normal.      Prior to Visit Medications    Medication Sig Taking? Authorizing Provider   isosorbide mononitrate (IMDUR) 60 MG extended release tablet Take 1 tablet by mouth 2 times daily Yes OLIVIA Figueroa   metoprolol (LOPRESSOR) 100 MG tablet Take 1 tablet by mouth 2 times daily Yes OLIVIA Figueroa   aspirin 81 MG chewable tablet Take 1 tablet by mouth daily Yes Rei Fraga MD   atorvastatin (LIPITOR) 40 MG tablet Take 1 tablet by mouth nightly Yes Rei Fraga MD   clopidogrel (PLAVIX) 75 MG tablet Take 1 tablet by mouth daily Yes Rei Fraga MD   finasteride (PROSCAR) 5 MG tablet Take 5 mg by mouth daily Yes Historical Provider, MD   nitroGLYCERIN (NITROSTAT) 0.4 MG SL tablet up to max of 3 total doses.  If no relief after 1 dose, call 911.  Patient not taking: Reported on 9/28/2021  Ender Padilla MD        Review of Systems   Constitutional: Negative for activity change, appetite change, chills, diaphoresis and fatigue. HENT: Negative for congestion, dental problem, drooling, ear discharge, postnasal drip and rhinorrhea. Eyes: Negative for visual disturbance. Respiratory: Positive for cough, shortness of breath and wheezing. Negative for apnea and chest tightness. Gastrointestinal: Negative for abdominal distention, abdominal pain and anal bleeding. Endocrine: Negative for cold intolerance, heat intolerance and polydipsia. Genitourinary: Negative for difficulty urinating, dysuria, enuresis and flank pain. Musculoskeletal: Negative for arthralgias, back pain and gait problem. Allergic/Immunologic: Negative for environmental allergies. Neurological: Negative for dizziness, facial asymmetry, light-headedness and headaches. Vitals:    09/28/21 1110   BP: 132/84   Pulse: 73   Temp: 96.3 °F (35.7 °C)   SpO2: (!) 86%     Physical Exam  Vitals reviewed. Constitutional:       Appearance: Normal appearance. HENT:      Head: Normocephalic and atraumatic. Nose: Nose normal.   Eyes:      Extraocular Movements: Extraocular movements intact. Conjunctiva/sclera: Conjunctivae normal.   Cardiovascular:      Rate and Rhythm: Normal rate and regular rhythm. Heart sounds: No murmur heard. No friction rub. Pulmonary:      Effort: Pulmonary effort is normal. No respiratory distress. Breath sounds: No stridor. Rales present. No wheezing or rhonchi. Abdominal:      General: There is no distension. Palpations: There is no mass. Tenderness: There is no abdominal tenderness. There is no guarding or rebound. Musculoskeletal:      Cervical back: Normal range of motion and neck supple. Neurological:      Mental Status: He is alert and oriented to person, place, and time.              This note was generated used a voice recognition software. Errors in voice recognition may have occurred. An electronic signature was used to authenticate this note.     --Marti Bañuelos MD

## 2021-10-05 PROBLEM — J18.9 HCAP (HEALTHCARE-ASSOCIATED PNEUMONIA): Status: ACTIVE | Noted: 2021-01-01

## 2021-10-05 PROBLEM — J96.21 ACUTE ON CHRONIC RESPIRATORY FAILURE WITH HYPOXEMIA (HCC): Status: ACTIVE | Noted: 2021-01-01

## 2021-10-05 PROBLEM — R06.02 SHORTNESS OF BREATH: Status: ACTIVE | Noted: 2021-01-01

## 2021-10-05 NOTE — ED PROVIDER NOTES
[facility] EMERGENCY DEPT  eMERGENCY dEPARTMENT eNCOUnter      Pt Name: Roni Brush  MRN: [id number]  [place] 1945  Date of evaluation: 10/5/2021  Provider: MD Mehdi Fulton       Chief Complaint   Patient presents with    Shortness of Breath         HISTORY OF PRESENT ILLNESS   (Location/Symptom, Timing/Onset,Context/Setting, Quality, Duration, Modifying Factors, Severity)  Note limiting factors. Roni Brush is a 68 y.o. male who presents to the emergency department for evaluation regarding worsening shortness of breath. States that the symptoms been ongoing for the past 2 to 3 days. On arrival ED patient be hypoxic oxygen saturation of 6. Has a prior history of COPD, bullous lung disease pulmonary fibrosis. He has been seen by pulmonology and was for 2 Gordon secondary to extensive bullous changes of the lungs however was felt not surgical candidate. Family dates that the symptoms are much worse he normally is related to his breathing. He has not had any cough or congestion according to family. Patient has been previously vaccinated for COVID-19. Patient denies chest pain. No prior history of pulmonary embolism or DVT. There have been no relieving factors for the symptoms. HPI    NursingNotes were reviewed. REVIEW OF SYSTEMS    (2-9 systems for level 4, 10 or more for level 5)     Review of Systems   Constitutional: Negative for chills and fever. HENT: Negative for congestion. Respiratory: Positive for cough and shortness of breath. Cardiovascular: Negative for chest pain and leg swelling. Gastrointestinal: Negative for abdominal distention, abdominal pain, diarrhea, nausea and vomiting. Neurological: Negative for dizziness and syncope. All other systems reviewed and are negative.            PAST MEDICALHISTORY     Past Medical History:   Diagnosis Date    Arthritis     CAD (coronary artery disease) 3/4/2021    Chronic kidney disease     enlarged prostate Not on file   Occupational History    Not on file   Tobacco Use    Smoking status: Former Smoker     Packs/day: 1.50     Years: 22.00     Pack years: 33.00     Types: Cigarettes    Smokeless tobacco: Former User     Types: Chew   Vaping Use    Vaping Use: Never used   Substance and Sexual Activity    Alcohol use: Never    Drug use: Never    Sexual activity: Not Currently     Partners: Female   Other Topics Concern    Not on file   Social History Narrative    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[employer]     48 years only marriage    He has 1 daughter and 2 sons    Education high school 1 year college    Previously [employer] active duty 3 years   1967 he has gone to the [employer] for healthcare in the past    He enjoys shooting sports    Smoked until age 43 packs/day denies alcohol consumption or substance usage     Social Determinants of Health     Financial Resource Strain:     Difficulty of Paying Living Expenses:    Food Insecurity:     Worried About Running Out of Food in the Last Year:     [address] in the Last Year:    Transportation Needs:     Lack of Transportation (Medical):      Lack of Transportation (Non-Medical):    Physical Activity:     Days of Exercise per Week:     Minutes of Exercise per Session:    Stress:     Feeling of Stress :    Social Connections:     Frequency of Communication with Friends and Family:     Frequency of Social Gatherings with Friends and Family:     Attends [employer]orationist Services:     Active Member of Clubs or Organizations:     Attends Club or Organization Meetings:     Marital Status:    Intimate Partner Violence:     Fear of Current or Ex-Partner:     Emotionally Abused:     Physically Abused:     Sexually Abused:        SCREENINGS    [place] Coma Scale  Eye Opening: Spontaneous  Best Verbal Response: Oriented  Best Motor Response: Obeys commands  [place] Coma Scale Score: 15        PHYSICAL EXAM    (up to 7 for level 4, 8 or more for level 5)     ED Triage Vitals [10/05/21 0127]   BP Temp Temp Source Pulse Resp SpO2 Height Weight   (!) 138/94 97.6 °F (36.4 °C) Infrared 99 30 (!) 58 % 6' (1.829 m) 196 lb (88.9 kg)       Physical Exam  Vitals and nursing note reviewed. Constitutional:       General: He is in acute distress. HENT:      Head: Atraumatic. Mouth/Throat:      Mouth: Mucous membranes are moist. Mucous membranes are not dry. Eyes:      General: No scleral icterus. Pupils: Pupils are equal, round, and reactive to light. Neck:      Trachea: No tracheal deviation. Cardiovascular:      Rate and Rhythm: Regular rhythm. Tachycardia present. Pulses: Normal pulses. Heart sounds: Normal heart sounds. No murmur heard. Pulmonary:      Effort: Respiratory distress present. Breath sounds: No stridor. Wheezing and rhonchi present. Abdominal:      General: There is no distension. Palpations: Abdomen is soft. Tenderness: There is no abdominal tenderness. There is no guarding. Musculoskeletal:      Right lower leg: No edema. Left lower leg: No edema. Skin:     Capillary Refill: Capillary refill takes less than 2 seconds. Coloration: Skin is not pale. Findings: No rash. Neurological:      General: No focal deficit present. Mental Status: He is alert and oriented to person, place, and time. Psychiatric:         Behavior: Behavior is cooperative. DIAGNOSTIC RESULTS     EKG: All EKG's areinterpreted by the Emergency Department Physician who either signs or Co-signs this chart in the absence of a cardiologist.    6898: Normal sinus rhythm at 96, no acute ST elevations identified. QTc: 445 MS.     RADIOLOGY:  Non-plain film images such as CT, Ultrasound and MRI are read by the radiologist. Plain radiographic images are visualized and preliminarily interpreted bythe emergency physician with the below findings:    CTA OTHER - pe chest:    No acute pulmonary embolism. Comparison to 9/21/2021, increased interlobular septal thickening predominantly involving the left upper lobe and right lower lobe with ground-glass opacification which may be due to edema or infection. Extensive peripheral reticulation and traction bronchiectasis most prominent in the bilateral bases consistent with interstitial lung disease. Large bulla in the anterior left upper lobe and lateral right lower lobe, unchanged from prior study. Unchanged mediastinal and bilateral hilar lymphadenopathy. No pleural effusions or pneumothorax. No acute fractures. Status post cholecystectomy. XR CHEST PORTABLE    (Results Pending)   CTA PULMONARY W CONTRAST    (Results Pending)           LABS:  Labs Reviewed   BLOOD GAS, ARTERIAL - Abnormal; Notable for the following components:       Result Value    pO2, Arterial 50.0 (*)     O2 Sat, Arterial 86.7 (*)     All other components within normal limits    Narrative:     CALL  [facility] tel. , harleen gallegos rn, 10/05/2021 01:32, by [address]  [place] - Abnormal; Notable for the following components:    Sodium 135 (*)     Glucose 176 (*)     All other components within normal limits   CBC WITH AUTO DIFFERENTIAL - Abnormal; Notable for the following components:    WBC 18.3 (*)     MCHC 32.7 (*)     Neutrophils % 80.3 (*)     Lymphocytes % 7.8 (*)     Neutrophils Absolute 14.7 (*)     Monocytes Absolute 1.80 (*)     All other components within normal limits   RESPIRATORY PANEL, MOLECULAR, WITH COVID-19   POTASSIUM, WHOLE BLOOD   BRAIN NATRIURETIC PEPTIDE   TROPONIN   LACTIC ACID, PLASMA       All other labs were within normal range or not returned as of this dictation.     EMERGENCY DEPARTMENT COURSE and DIFFERENTIAL DIAGNOSIS/MDM:   Vitals:    Vitals:    10/05/21 0200 10/05/21 0224 10/05/21 0230 10/05/21 0449   BP: 101/71 101/71 105/73 112/78   Pulse: 95 93 94 93   Resp: 25 28 28 29   Temp: TempSrc:       SpO2: 91% 91% 92% 92%   Weight:       Height:           MDM    Reassessment    Patient presented to the ED with hypoxia, oxygen saturation in the 60s upon arrival.  He was tachypneic with increased work of breathing and accessory muscle use on physical exam.  An ABG was obtained and patient was placed on BiPAP therapy. Subsequent improvement in respiratory status, at this time saturations ranged from 89% up to around 92% he does have a prior history of pulmonary fibrosis and follows with pulmonology as an outpatient. CONSULTS:    Case was discussed with Dr. Ariel Garcia regarding inpatient admission to the hospitalist service. PROCEDURES:  Unless otherwise noted below, none     Procedures     CRITICAL CARE TIME   Total Critical Care time was 42 minutes, excluding separately reportable procedures. There was a high probability of clinically significant/life threatening deterioration in the patient's condition which required my urgent intervention. Patient required my immediate presence at the bedside. Multiple reexaminations were undertaken throughout ED course of care. Time was spent reviewing plan of care with ED nursing staff. Time is inclusive of  and clinical documentation. FINAL IMPRESSION      1.  Acute hypoxemic respiratory failure ([address]    2. Pulmonary fibrosis ([address]          DISPOSITION/PLAN   DISPOSITION Decision To Admit 10/05/2021 05:01:15 AM    (Please note that portions of this note were completed with a voice recognition program.  Efforts were made to edit thedictations but occasionally words are mis-transcribed.)    Ochoa Landin MD (electronically signed)  Attending Emergency Physician         Ochoa Landin MD  10/15/21 8844

## 2021-10-05 NOTE — PROGRESS NOTES
Pharmacy Note  Vancomycin Consult    Chela Pierre is a 68 y.o. male started on Vancomycin for HCAP; consult received from Dr. Leo Pritchard to manage therapy. Also receiving the following antibiotics: Cefepime. Principal Problem:    Acute on chronic respiratory failure with hypoxemia (HCC)  Active Problems:    CAD (coronary artery disease)    Hypertension    Acute respiratory failure with hypoxia (HCC)    HLD (hyperlipidemia)    BPH (benign prostatic hyperplasia)    Pulmonary fibrosis (HCC)    Lung bullae (HCC)    BENEDICT (dyspnea on exertion)    Wheezing    Chronic respiratory failure with hypoxia (HCC)    Shortness of breath    HCAP (healthcare-associated pneumonia)  Resolved Problems:    * No resolved hospital problems. *      Allergies:  Patient has no known allergies. Temp max: 97.7    Recent Labs     10/05/21  0125   BUN 18       Recent Labs     10/05/21  0125   CREATININE 0.9       Recent Labs     10/05/21  0125   WBC 18.3*       No intake or output data in the 24 hours ending 10/05/21 0856    Culture Date Source Results   None ordered at this time    Ht Readings from Last 1 Encounters:   10/05/21 6' (1.829 m)        Wt Readings from Last 1 Encounters:   10/05/21 198 lb (89.8 kg)         Body mass index is 26.85 kg/m². Estimated Creatinine Clearance: 77 mL/min (based on SCr of 0.9 mg/dL). Assessment/Plan:  Will initiate Vancomycin 2250 mg IV x 1 followed by Vancomycin 1500 mg IV every 24 hours. Timing of trough level will be determined based on culture results, renal function, and clinical response. Thank you for the consult. Will continue to follow.     Electronically signed by BLAKE Hernandez [facility] on 10/5/2021 at 8:56 AM

## 2021-10-05 NOTE — H&P
HISTORY AND PHYSICAL             Date: 10/5/2021        Patient Name: Rudolph Torres     YOB: 1945      Age:  68 y.o. Chief Complaint     Chief Complaint   Patient presents with    Shortness of Breath      Patient with underlying lung disease   Copd   Bullous lung disease   Idiopathic pulmonary fibrosis     History Obtained From   Patient     History of Present Illness   Patient has severe lung disease   He has been seeing pulmonologist over the last several months. Had been referred to [facility] to have lung surgery for bullous changes in lungs and to pursue lung reduction surgery . He was not deemed a surgical candidate. He has been in his usual state of health until about 48 hours ago, when he developed increasing shortness of breath, cough, mild clear sputum production. Unable to catch his breath with any type of movement. No fever, no chills. Weak all over, unable to move and cough and clear his lungs. Wife insisted to him that they needed to come to er to get help. He was evaluated in ER   Noted to have severe hypoxemia at rest   Worsening with movement   Diffuse rhonchi and wheezes   Unable to finish a sentence without catching his breath   Lips were turning blue upon arrival.    Patient had a complete evaluation done   No evidence of any pulmonary emboli   Bilateral lungs with bullous disease   Pulmonary fibrotic changes.    Diffuse interstitial lung disease noted bilaterally   Severe hypoxemia that was relieved and improved with the use of bipap and he is being admitted to the ICU         Past Medical History     Past Medical History:   Diagnosis Date    Arthritis     CAD (coronary artery disease) 3/4/2021    Chronic kidney disease     enlarged prostate    Community acquired pneumonia of right lower lobe of lung 7/22/2021    HLD (hyperlipidemia) 7/22/2021    Hypertension         Past Surgical History     Past Surgical History:   Procedure Laterality Date    CHOLECYSTECTOMY      COLONOSCOPY      ENDOSCOPY, COLON, DIAGNOSTIC      HERNIA REPAIR      JOINT REPLACEMENT Bilateral     SKIN BIOPSY          Medications Prior to Admission     Prior to Admission medications    Medication Sig Start Date End Date Taking? Authorizing Provider   isosorbide mononitrate (IMDUR) 60 MG extended release tablet Take 1 tablet by mouth 2 times daily 8/3/21 10/5/21 Yes OLIVIA De León   metoprolol (LOPRESSOR) 100 MG tablet Take 1 tablet by mouth 2 times daily 5/24/21  Yes OLIVIA De León   aspirin 81 MG chewable tablet Take 1 tablet by mouth daily 11/15/20  Yes Jenifer Arias MD   atorvastatin (LIPITOR) 40 MG tablet Take 1 tablet by mouth nightly 11/14/20  Yes Jenifer Arias MD   clopidogrel (PLAVIX) 75 MG tablet Take 1 tablet by mouth daily 11/15/20  Yes Jenifer Arias MD   finasteride (PROSCAR) 5 MG tablet Take 5 mg by mouth daily   Yes Historical Provider, MD   Nintedanib Esylate (OFEV) 150 MG CAPS Take 150 mg by mouth 2 times daily 9/28/21   Jessica Graham MD   nitroGLYCERIN (NITROSTAT) 0.4 MG SL tablet up to max of 3 total doses. If no relief after 1 dose, call 911. Patient not taking: Reported on 9/28/2021 3/6/21   Ender Padilla MD        Allergies   Patient has no known allergies.     Social History     Social History     Tobacco History     Smoking Status  Former Smoker Smoking Frequency  1.5 packs/day for 22 years (33 pk yrs) Smoking Tobacco Type  Cigarettes    Smokeless Tobacco Use  Former User Smokeless Tobacco Type  Chew          Alcohol History     Alcohol Use Status  Never          Drug Use     Drug Use Status  Never          Sexual Activity     Sexually Active  Not Currently Partners  Female                Family History     Family History   Problem Relation Age of Onset    Cancer Mother     Heart Attack Father     Heart Disease Father     High Blood Pressure Father     Arthritis Father     Cancer Brother     Substance Abuse Brother     Arthritis Brother     High Blood Pressure Brother     No Known Problems Maternal Aunt     Heart Disease Maternal Uncle     High Blood Pressure Maternal Uncle     Coronary Art Dis Maternal Uncle     No Known Problems Paternal Aunt     No Known Problems Paternal Uncle     Heart Attack Brother     Heart Disease Brother     Coronary Art Dis Brother     High Blood Pressure Brother        Review of Systems   Review of Systems   Constitutional: Positive for activity change, chills, diaphoresis and fatigue. Eyes: Negative. Respiratory: Positive for cough, chest tightness, shortness of breath, wheezing and stridor. Cardiovascular: Positive for palpitations and leg swelling. Gastrointestinal: Negative. Endocrine: Negative. Genitourinary: Negative. Musculoskeletal: Positive for arthralgias. Allergic/Immunologic: Negative. Neurological: Positive for dizziness and weakness. Hematological: Negative. Psychiatric/Behavioral: Negative. All other systems reviewed and are negative. Physical Exam   /68   Pulse 89   Temp 97.6 °F (36.4 °C) (Infrared)   Resp 29   Ht 6' (1.829 m)   Wt 196 lb (88.9 kg)   SpO2 (!) 88%   BMI 26.58 kg/m²     Physical Exam  Vitals and nursing note reviewed. Constitutional:       General: He is in acute distress. Appearance: He is ill-appearing and diaphoretic. HENT:      Head: Normocephalic and atraumatic. Mouth/Throat:      Mouth: Mucous membranes are dry. Eyes:      Extraocular Movements: Extraocular movements intact. Conjunctiva/sclera: Conjunctivae normal.      Pupils: Pupils are equal, round, and reactive to light. Cardiovascular:      Rate and Rhythm: Regular rhythm. Tachycardia present. Pulmonary:      Breath sounds: Stridor present. Wheezing and rales present. Abdominal:      General: Abdomen is flat. Bowel sounds are normal.      Palpations: Abdomen is soft.    Musculoskeletal:         General: Normal range of motion. Cervical back: Normal range of motion. Right lower leg: Edema present. Left lower leg: Edema present. Skin:     General: Skin is warm. Neurological:      General: No focal deficit present.    Psychiatric:         Mood and Affect: Mood normal.         Labs      Recent Results (from the past 24 hour(s))   Comprehensive Metabolic Panel    Collection Time: 10/05/21  1:25 AM   Result Value Ref Range    Sodium 135 (L) 136 - 145 mmol/L    Potassium 4.3 3.5 - 5.0 mmol/L    Chloride 103 98 - 111 mmol/L    CO2 22 22 - 29 mmol/L    Anion Gap 10 7 - 19 mmol/L    Glucose 176 (H) 74 - 109 mg/dL    BUN 18 8 - 23 mg/dL    CREATININE 0.9 0.5 - 1.2 mg/dL    GFR Non-African American >60 >60    GFR African American >59 >59    Calcium 9.2 8.8 - 10.2 mg/dL    Total Protein 7.0 6.6 - 8.7 g/dL    Albumin 3.7 3.5 - 5.2 g/dL    Total Bilirubin 0.8 0.2 - 1.2 mg/dL    Alkaline Phosphatase 92 40 - 130 U/L    ALT 12 5 - 41 U/L    AST 15 5 - 40 U/L   CBC Auto Differential    Collection Time: 10/05/21  1:25 AM   Result Value Ref Range    WBC 18.3 (H) 4.8 - 10.8 K/uL    RBC 5.07 4.70 - 6.10 M/uL    Hemoglobin 14.9 14.0 - 18.0 g/dL    Hematocrit 45.5 42.0 - 52.0 %    MCV 89.7 80.0 - 94.0 fL    MCH 29.4 27.0 - 31.0 pg    MCHC 32.7 (L) 33.0 - 37.0 g/dL    RDW 14.5 11.5 - 14.5 %    Platelets 602 980 - 338 K/uL    MPV 9.9 9.4 - 12.4 fL    Neutrophils % 80.3 (H) 50.0 - 65.0 %    Lymphocytes % 7.8 (L) 20.0 - 40.0 %    Monocytes % 9.6 0.0 - 10.0 %    Eosinophils % 1.6 0.0 - 5.0 %    Basophils % 0.2 0.0 - 1.0 %    Neutrophils Absolute 14.7 (H) 1.5 - 7.5 K/uL    Immature Granulocytes # 0.1 K/uL    Lymphocytes Absolute 1.4 1.1 - 4.5 K/uL    Monocytes Absolute 1.80 (H) 0.00 - 0.90 K/uL    Eosinophils Absolute 0.30 0.00 - 0.60 K/uL    Basophils Absolute 0.00 0.00 - 0.20 K/uL   Brain Natriuretic Peptide    Collection Time: 10/05/21  1:25 AM   Result Value Ref Range    Pro- 0 - 1,800 pg/mL   Troponin    Collection Time: 10/05/21 1:25 AM   Result Value Ref Range    Troponin <0.01 0.00 - 0.03 ng/mL   BLOOD GAS, ARTERIAL    Collection Time: 10/05/21  1:31 AM   Result Value Ref Range    pH, Arterial 7.410 7.350 - 7.450    pCO2, Arterial 35.0 35.0 - 45.0 mmHg    pO2, Arterial 50.0 (LL) 80.0 - 100.0 mmHg    HCO3, Arterial 22.2 22.0 - 26.0 mmol/L    Base Excess, Arterial -1.8 -2.0 - 2.0 mmol/L    Hemoglobin, Art, Extended 15.1 14.0 - 18.0 g/dL    O2 Sat, Arterial 86.7 (LL) >92 %    Carboxyhgb, Arterial 3.0 0.0 - 5.0 %    Methemoglobin, Arterial 1.1 <1.5 %    O2 Content, Arterial 18.4 Not Established mL/dL    O2 Therapy Unknown    Potassium, Whole Blood    Collection Time: 10/05/21  1:31 AM   Result Value Ref Range    Potassium, Whole Blood 3.7    Respiratory Panel, Molecular, with COVID-19 (Restricted: peds pts or suitable admitted adults)    Collection Time: 10/05/21  1:31 AM    Specimen: Nasopharyngeal   Result Value Ref Range    Adenovirus by PCR Not Detected Not Detected    Bordetella parapertussis by PCR Not Detected Not Detected    Bordetella pertussis by PCR Not Detected Not Detected    Chlamydophilia pneumoniae by PCR Not Detected Not Detected    Coronavirus 229E by PCR Not Detected Not Detected    Coronavirus HKU1 by PCR Not Detected Not Detected    Coronavirus NL63 by PCR Not Detected Not Detected    Coronavirus OC43 by PCR Not Detected Not Detected    SARS-CoV-2, PCR Not Detected Not Detected    Human Metapneumovirus by PCR Not Detected Not Detected    Human Rhinovirus/Enterovirus by PCR Not Detected Not Detected    Influenza A by PCR Not Detected Not Detected    Influenza B by PCR Not Detected Not Detected    Mycoplasma pneumoniae by PCR Not Detected Not Detected    Parainfluenza Virus 1 by PCR Not Detected Not Detected    Parainfluenza Virus 2 by PCR Not Detected Not Detected    Parainfluenza Virus 3 by PCR Not Detected Not Detected    Parainfluenza Virus 4 by PCR Not Detected Not Detected    Respiratory Syncytial Virus by PCR Not Detected Not Detected   Lactic Acid, Plasma    Collection Time: 10/05/21  1:48 AM   Result Value Ref Range    Lactic Acid 1.1 0.5 - 1.9 mmol/L        Imaging/Diagnostics Last 24 Hours   No results found. Assessment      Hospital Problems         Last Modified POA    * (Principal) Acute on chronic respiratory failure with hypoxemia ([address] 10/5/2021 Yes    CAD (coronary artery disease) 10/5/2021 Yes    Hypertension 10/5/2021 Yes    Acute respiratory failure with hypoxia ([address] 10/5/2021 Yes    HLD (hyperlipidemia) 10/5/2021 Yes    BPH (benign prostatic hyperplasia) 10/5/2021 Yes    Pulmonary fibrosis ([address] 10/5/2021 Yes    Lung bullae ([address] 10/5/2021 Yes    BENEDICT (dyspnea on exertion) 10/5/2021 Yes    Wheezing 10/5/2021 Yes    Chronic respiratory failure with hypoxia ([address] 10/5/2021 Yes    Shortness of breath 10/5/2021 Yes    HCAP (healthcare-associated pneumonia) 10/5/2021 Yes          Plan   [facility]. 67 yo male being admitted to the icu due to acute on chronic respiratory failure due to copd exacerbation, pulmonary fibrotic lung disease, shortness of breath , severe dyspnea on exertion, bullous lung disease, all leading to an causing worsening hypoxemia     2. Admit to icu     3.  bipap     4. Full code status     5. Seeing pulmonary outpatient     6. Diurese    7. Treat as HCAP     8. Steroids. 9. Bronchodilators     10. Monitor labs     Pulmonary toilet     dvt and gi prophylaxis. Monitor labs and blood sugars    Acute on chronic hypoxemic respiratory failure due to theabove.      Consultations Ordered:  PHARMACY TO DOSE VANCOMYCIN    Electronically signed by Raffy Tobar MD on 10/5/21 at 6:18 AM CDT

## 2021-10-05 NOTE — PROGRESS NOTES
Component Value Ref Range & Units Status Collected Lab   pH, Arterial 7.410  7.350 - 7.450 Final 10/05/2021  1:31 AM [facility]   pCO2, Arterial 35.0  35.0 - 45.0 mmHg Final 10/05/2021  1:31 AM [facility]   pO2, Arterial 50. 0Low Panic   80.0 - 100.0 mmHg Final 10/05/2021  1:31 AM [facility]   HCO3, Arterial 22.2  22.0 - 26.0 mmol/L Final 10/05/2021  1:31 AM [facility] Excess, Arterial -1.8  -2.0 - 2.0 mmol/L Final 10/05/2021  1:31 AM [facility]   Hemoglobin, Art, Extended 15.1  14.0 - 18.0 g/dL Final 10/05/2021  1:31 AM [facility]   O2 Sat, Arterial 86.7Low Panic   >92 % Final 10/05/2021  1:31 AM [facility]   Carboxyhgb, Arterial 3.0  0.0 - 5.0 % Final 10/05/2021  1:31 AM [facility]        0.0-1.5   (Smokers 1.5-5.0)    Methemoglobin, Arterial 1.1  <1.5 % Final 10/05/2021  1:31 AM [facility]   O2 Content, Arterial 18.4  Not Established         100% nrb  r-27bpm  Right radial  At+

## 2021-10-05 NOTE — PROGRESS NOTES
Patient seen and examined. All data reviewed. Appropriate interventions instituted. Wife at bedside. All questions sought and answered.     Bettie Yarbrough MD  10/5/2021

## 2021-10-05 NOTE — PROGRESS NOTES
4 Eyes Skin Assessment    Dona Manriquez is being assessed upon: Admission    I agree that I, Yogesh Bautista, RN, along with *** (either 2 RN's or 1 LPN and 1 RN) have performed a thorough Head to Toe Skin Assessment on the patient. ALL assessment sites listed below have been assessed. Areas assessed by both nurses:     [x]   Head, Face, and Ears   [x]   Shoulders, Back, and Chest  [x]   Arms, Elbows, and Hands   [x]   Coccyx, Sacrum, and Ischium  [x]   Legs, Feet, and Heels    Does the Patient have Skin Breakdown?  No    Bhanu Prevention initiated: NA  Wound Care Orders initiated: NA    [facility] nurse consulted for Pressure Injury (Stage 3,4, Unstageable, DTI, NWPT, and Complex wounds) and New or Established Ostomies: NA        Primary Nurse eSignature: Yogesh Bautista RN on 10/5/2021 at 10:54 AM      Co-Signer eSignature: {Esignature:[id number]}

## 2021-10-06 PROBLEM — Z51.5 PALLIATIVE CARE PATIENT: Status: ACTIVE | Noted: 2021-01-01

## 2021-10-06 NOTE — CONSULTS
**Physician Signature**  This document was electronically signed by: Brittney Perkins DO  10/05/2021   11:36 AM    **Consult Information**  Member Facility: [address] [place] [address] Drive MRN: [id number]  Visit/Encounter Number: [id number]  Consult ID: [id number]  Facility Time Zone: CT  Date and Time of Request: 10/05/2021 09:51 AM  CT  Requesting Clinician: DR MATTHEWS  Patient Name: Juanpablo Torres  YOB: 1945  Gender: Male  Patient identity was confirmed at the beginning of the consult with the   patient/family/staff using two personal identifiers: Patient name and       **Reason for Consult**  Reason for Consult: [facility]    **Admission**  Admission Date: 10-  Chief reason for ICU admission: Pneumonia    **Core Metrics**  General orienting sentence for patient: 69 y/o with hx COPD, pulm fibrosis   admitted with pneumonia. On BiPAP /. No pressors currently.   Chief physiologic deterioration: Increase in FiO2 required by   30%  Is the patient on DVT prophylaxis?: Yes  Prophylaxis type: Pharmacological  DVT Prophylaxis Comments: LMWH  Is the patient on GI prophylaxis?: Yes  Gi Prophylaxis Comments: pepcid  Has this patient reached their nutritional goal?: Yes  Nutritional Goals Details: diet  Are there current issues with pain management in this patient?:   No  Are there issues with skin integrity?: No  Are there issues with delirium?: No  Has the patient been mobilized?: No  Mobilized Details: desaturates with movement  Is this patient currently intubated?: No  Are there ethical or care philosophy or family issues?: No  Do you recommend an in depth evaluation?: No  Disposition Recommendations: Continue ICU level of care    **Physician Signature**  This document was electronically signed by: Brittney Perkins DO  10/05/2021   11:36 AM

## 2021-10-06 NOTE — CONSULTS
**Physician Signature**  This document was electronically signed by: Kelley Rain MD  10/06/2021   11:16 AM    **Consult Information**  Member Facility: [address] [place] [address] Drive MRN: [id number]  Visit/Encounter Number: [id number]  Consult ID: [id number]  Facility Time Zone: CT  Date and Time of Request: 10/06/2021 06:17 AM  CT  Requesting Clinician: DR MATTHEWS  Patient Name: Bard Reyes  YOB: 1945  Gender: Male  Patient identity was confirmed at the beginning of the consult with the   patient/family/staff using two personal identifiers: Patient name and       **Reason for Consult**  Reason for Consult: [facility]    **Admission**  Admission Date: 10-  Chief reason for ICU admission: Pneumonia    **Core Metrics**  General orienting sentence for patient: (10/5)  69 y/o with hx COPD, pulm   fibrosis admitted with pneumonia. On BiPAP 14/6. No pressors current PM:    HFNC 70 LPM at 90%, Left upper lobe pneumonia. Mobilized. on 70 lpm, 100%   HFNC, now placed back on BIPAP 14/6 with 95 liters. Getting   steroids, and abx.  Palliative care consult now  Chief physiologic deterioration: Increase in FiO2 required by   30%  Is the patient on DVT prophylaxis?: Yes  Prophylaxis type: Pharmacological  DVT Prophylaxis Comments: LMWH  Is the patient on GI prophylaxis?: Yes  Gi Prophylaxis Comments: pepcid  Has this patient reached their nutritional goal?: No and issues are being   addressed  Nutritional Goals Details: NPO while BIPAP  Are there current issues with pain management in this patient?:   No  Are there issues with skin integrity?: No  Are there issues with delirium?: No  Has the patient been mobilized?: Yes  Is this patient currently intubated?: No  Are there ethical or care philosophy or family issues?: No  Do you recommend an in depth evaluation?: No  Disposition Recommendations: Continue ICU level of care    **Physician Signature**  This document was electronically signed by:

## 2021-10-06 NOTE — ACP (ADVANCE CARE PLANNING)
Advance Care Planning     Advance Care Planning Activator (Inpatient)  Conversation Note      Date of ACP Conversation: 10/6/2021     Conversation Conducted with: Pt: Damion Adler and wife, Karol Rosales    ACP Activator: Guzman Love RN        Health Care Decision Maker:     Current Designated Health Care Decision Maker:     Primary Decision Maker: Flex Greene - Spouse - [phone]    Secondary Decision Maker: Denilson Iqbal - [phone]    Secondary Decision Maker: Denilson Iqbal - [phone]    Secondary Decision Maker: Nicola Benton - Child - [phone]      Care Preferences    Ventilation: \"If you were in your present state of health and suddenly became very ill and were unable to breathe on your own, what would your preference be about the use of a ventilator (breathing machine) if it were available to you? \"      Would the patient desire the use of ventilator (breathing machine)?:  NO        Resuscitation  \"CPR works best to restart the heart when there is a sudden event, like a heart attack, in someone who is otherwise healthy. Unfortunately, CPR does not typically restart the heart for people who have serious health conditions or who are very sick. \"    \"In the event your heart stopped as a result of an underlying serious health condition, would you want attempts to be made to restart your heart (answer \"yes\" for attempt to resuscitate) or would you prefer a natural death (answer \"no\" for do not attempt to resuscitate)? \" YES          Conversation Outcomes:  [x] ACP discussion completed

## 2021-10-06 NOTE — PLAN OF CARE
Nutrition Problem #1: Inadequate oral intake  Intervention: Food and/or Nutrient Delivery: Continue Current Diet  Nutritional Goals: Po intake >50% meals. wt stable.

## 2021-10-06 NOTE — PROGRESS NOTES
Comprehensive Nutrition Assessment    Type and Reason for Visit:  Initial, Positive Nutrition Screen    Nutrition Recommendations/Plan:   Continue current diet. Nutrition Assessment:  +NS for poor appetite. Pt at risk for nutrition compromise AEB impaired respiratory function requiring BiPAP. Pt reports fair appetite. Continue current diet, pt declines ONS at this time. Malnutrition Assessment:  Malnutrition Status:  No malnutrition    Context:  Acute Illness     Findings of the 6 clinical characteristics of malnutrition:  Energy Intake:  Mild decrease in energy intake (Comment)  Weight Loss:  No significant weight loss     Body Fat Loss:  No significant body fat loss     Muscle Mass Loss:  No significant muscle mass loss    Fluid Accumulation:  No significant fluid accumulation     Strength:  Not Performed    Estimated Daily Nutrient Needs:  Energy (kcal):  8182-7324 kcals/day; Weight Used for Energy Requirements:  Current (20-25 kcals/kg)     Protein (g):   g/pro/day; Weight Used for Protein Requirements:  Ideal (1.2-1.4 g/kg)        Fluid (ml/day):  8174-1854 mL/fluid/day; Method Used for Fluid Requirements:  1 ml/kcal      Nutrition Related Findings: BiPAP      Current Nutrition Therapies:    ADULT DIET; Easy to Chew; 4 carb choices (60 gm/meal)    Anthropometric Measures:  · Height: 6' (182.9 cm)  · Current Body Weight: 202 lb (91.6 kg)   · Admission Body Weight: 196 lb (88.9 kg)    · Usual Body Weight: 201 lb (91.2 kg) (7/12/2021)     · Ideal Body Weight: 178 lbs  · BMI: 27.4   · BMI Categories: Overweight (BMI 25.0-29. 9)       Nutrition Diagnosis:   · Inadequate oral intake related to impaired respiratory function as evidenced by intake 26-50%    Nutrition Interventions:   Food and/or Nutrient Delivery:  Continue Current Diet  Nutrition Education/Counseling:  Education not indicated   Coordination of Nutrition Care:  Continue to monitor while inpatient    Goals:  Po intake >50% meals.  wt stable. Nutrition Monitoring and Evaluation:   Food/Nutrient Intake Outcomes:  Food and Nutrient Intake  Physical Signs/Symptoms Outcomes:  Biochemical Data, Nutrition Focused Physical Findings, Weight, Hemodynamic Status     Discharge Planning:     Too soon to determine     Electronically signed by Steph Pascal MS, RD, LD on 10/6/21 at 11:35 AM CDT    Contact: [phone]

## 2021-10-06 NOTE — PROGRESS NOTES
Hospitalist Progress Note    Patient:  Stalin Rojas  YOB: 1945  Date of Service: 10/6/2021  MRN: [id number]   Acct: [de-identified]   Primary Care Physician: Hayley Cat MD  Advance Directive: Full Code  Admit Date: 10/5/2021       Hospital Day: 1  Referring Provider: Gina Durham DO    Patient Seen, Chart, Consults, Notes, Labs, Radiology studies reviewed. Subjective:  Stalin Rojas is a 68 y.o. male  whom we are following for hypoxemic respiratory failure, pulmonary fibrosis, possible healthcare associated pneumonia. He remains quite dyspneic even on BiPAP. He will desaturate with any type of activity. Pulmonology is following. Allergies:  Patient has no known allergies.     Medicines:  Current Facility-Administered Medications   Medication Dose Route Frequency Provider Last Rate Last Admin    morphine (PF) injection 2 mg  2 mg IntraVENous Q4H PRN Gina Durham DO   2 mg at 10/06/21 0911    aspirin chewable tablet 81 mg  81 mg Oral Daily Tod Singh MD   81 mg at 10/06/21 0746    atorvastatin (LIPITOR) tablet 40 mg  40 mg Oral Nightly Tod Singh MD   40 mg at 10/05/21 2051    clopidogrel (PLAVIX) tablet 75 mg  75 mg Oral Daily Tod Singh MD   75 mg at 10/06/21 0746    finasteride (PROSCAR) tablet 5 mg  5 mg Oral Daily Tod Singh MD   5 mg at 10/06/21 0746    0.9 % sodium chloride bolus  250 mL IntraVENous Once Tod Singh MD        famotidine (PEPCID) injection 20 mg  20 mg IntraVENous BID Tod Singh MD   20 mg at 10/06/21 0745    ondansetron (ZOFRAN) injection 8 mg  8 mg IntraVENous Q6H PRN Tod Singh MD        enoxaparin (LOVENOX) injection 40 mg  40 mg SubCUTAneous Daily Tod Singh MD   40 mg at 10/06/21 0745    ceFEPIme (MAXIPIME) 2,000 mg in sodium chloride (PF) 20 mL IV syringe  2,000 mg IntraVENous Q8H Tod Singh MD   2,000 mg at 10/06/21 0745    budesonide (PULMICORT) nebulizer suspension 500 mcg  0.5 mg Nebulization BID Ania Foy MD   500 mcg at 10/06/21 0640    And    Arformoterol Tartrate (BROVANA) nebulizer solution 15 mcg  15 mcg Nebulization BID Ania Foy MD   15 mcg at 10/06/21 0640    influenza quadrivalent split vaccine (FLUZONE;FLUARIX;FLULAVAL;AFLURIA) injection 0.5 mL  0.5 mL IntraMUSCular Prior to discharge Umm Gonzalez MD        methylPREDNISolone sodium (SOLU-MEDROL) injection 60 mg  60 mg IntraVENous Q6H Umm Gonzalez MD   60 mg at 10/06/21 0745    doxycycline (VIBRAMYCIN) 100 mg in dextrose 5 % 100 mL IVPB  100 mg IntraVENous Q12H Umm Gonzalez MD   Stopped at 10/06/21 0316    ipratropium-albuterol (DUONEB) nebulizer solution 1 ampule  1 ampule Inhalation Q4H Umm Gonzalez MD   1 ampule at 10/06/21 1013    insulin lispro (HUMALOG) injection vial 0-6 Units  0-6 Units SubCUTAneous TID WC Umm Gonzalez MD   1 Units at 10/06/21 0802    insulin lispro (HUMALOG) injection vial 0-3 Units  0-3 Units SubCUTAneous Nightly Umm Gonzalez MD   1 Units at 10/05/21 2051    glucose (GLUTOSE) 40 % oral gel 15 g  15 g Oral PRN Umm Gonzalez MD        dextrose 50 % IV solution  12.5 g IntraVENous PRN Umm Gonzalez MD        glucagon (rDNA) injection 1 mg  1 mg IntraMUSCular PRN Umm Gonzalez MD        dextrose 5 % solution  100 mL/hr IntraVENous PRN Umm Gonzalez MD        metoprolol tartrate (LOPRESSOR) tablet 100 mg  100 mg Oral BID Ania Foy MD   100 mg at 10/06/21 0746       Past Medical History:  Past Medical History:   Diagnosis Date    Arthritis     CAD (coronary artery disease) 03/04/2021    Chronic kidney disease     enlarged prostate    Community acquired pneumonia of right lower lobe of lung 07/22/2021    HLD (hyperlipidemia) 07/22/2021    Hypertension     Palliative care patient 10/06/2021       Past Surgical History:  Past Surgical History:   Procedure Laterality Date    CHOLECYSTECTOMY      COLONOSCOPY      ENDOSCOPY, COLON, DIAGNOSTIC      HERNIA REPAIR      JOINT REPLACEMENT Bilateral     SKIN BIOPSY         Family History  Family History   Problem Relation Age of Onset    Cancer Mother     Heart Attack Father     Heart Disease Father     High Blood Pressure Father     Arthritis Father     Cancer Brother     Substance Abuse Brother     Arthritis Brother     High Blood Pressure Brother     No Known Problems Maternal Aunt     Heart Disease Maternal Uncle     High Blood Pressure Maternal Uncle     Coronary Art Dis Maternal Uncle     No Known Problems Paternal Aunt     No Known Problems Paternal Uncle     Heart Attack Brother     Heart Disease Brother     Coronary Art Dis Brother     High Blood Pressure Brother        Social History  Social History     Socioeconomic History    Marital status:      Spouse name: Marco Hubbard Number of children: 3    Years of education: 15    Highest education level: Not on file   Occupational History    Not on file   Tobacco Use    Smoking status: Former Smoker     Packs/day: 1.50     Years: 22.00     Pack years: 33.00     Types: Cigarettes    Smokeless tobacco: Former User     Types: Chew   Vaping Use    Vaping Use: Never used   Substance and Sexual Activity    Alcohol use: Never    Drug use: Never    Sexual activity: Not Currently     Partners: Female   Other Topics Concern    Not on file   Social History Narrative    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[employer]     48 years only marriage    He has 1 daughter and 2 sons    Education high school 1 year college    Previously [employer] 3 years   1967 he has gone to the [employer] for healthcare in the past    He enjoys shooting sports    Smoked until age 43 packs/day denies alcohol consumption or substance usage     Social Determinants of Health     Financial Resource Strain:     Difficulty of Palpations: Abdomen is soft. Musculoskeletal:         General: No swelling. Cervical back: Neck supple. No rigidity. Skin:     General: Skin is warm and dry. Neurological:      Mental Status: He is oriented to person, place, and time. Psychiatric:         Mood and Affect: Mood normal.         Thought Content: Thought content normal.         Labs:  BMP:   Recent Labs     10/05/21  0125 10/05/21  0131 10/06/21  0135   *  --  136   K 4.3 3.7 4.1     --  103   CO2 22  --  22   BUN 18  --  22   CREATININE 0.9  --  0.8   CALCIUM 9.2  --  9.5     CBC:   Recent Labs     10/05/21  0125 10/06/21  0135   WBC 18.3* 25.1*   HGB 14.9 14.0   HCT 45.5 42.2   MCV 89.7 89.2    191     LIVER PROFILE:   Recent Labs     10/05/21  0125 10/06/21  0135   AST 15 18   ALT 12 10   BILITOT 0.8 0.8   ALKPHOS 92 76     PT/INR: No results for input(s): PROTIME, INR in the last 72 hours. APTT: No results for input(s): APTT in the last 72 hours. BNP:  No results for input(s): BNP in the last 72 hours. Ionized Calcium:No results for input(s): IONCA in the last 72 hours. Magnesium:  Recent Labs     10/06/21  0135   MG 1.9     Phosphorus:No results for input(s): PHOS in the last 72 hours. HgbA1C:   Recent Labs     10/05/21  0125   LABA1C 5.7     Hepatic:   Recent Labs     10/05/21  0125 10/06/21  0135   ALKPHOS 92 76   ALT 12 10   AST 15 18   PROT 7.0 6.3*   BILITOT 0.8 0.8   LABALBU 3.7 3.8     Lactic Acid:   Recent Labs     10/05/21  0148   LACTA 1.1     Troponin: No results for input(s): CKTOTAL, CKMB, TROPONINT in the last 72 hours. ABGs: No results for input(s): PH, PCO2, PO2, HCO3, O2SAT in the last 72 hours. CRP:  No results for input(s): CRP in the last 72 hours. Sed Rate:  No results for input(s): SEDRATE in the last 72 hours. Cultures:   No results for input(s): CULTURE in the last 72 hours. No results for input(s): Apple SANTANA in the last 72 hours.   No results for input(s): CXSURG in the last 72 hours. Radiology reports as per the Radiologist  Radiology: XR CHEST PORTABLE    Result Date: 10/6/2021  XR CHEST PORTABLE 10/6/2021 11:27 AM History: Pneumonia, follow-up. Portable chest x-ray compared with yesterday at 1:48 AM. Diffuse left upper lobe infiltrate is not significantly changed. Infiltrate throughout most of the right lung has increased compared with yesterday. There is no pneumothorax. Overall lower lung volumes now compared with yesterday. Stable heart size. Bibasilar bullous disease is again seen. 1. Stable appearance of left upper lobe pneumonia. 2. Increased right side pneumonia. Signed by Dr Arden Muhammad    XR CHEST PORTABLE    Result Date: 10/5/2021  EXAMINATION: XR CHEST PORTABLE 10/5/2021 9:20 AM HISTORY: Cough. Report: A portable upright frontal view the chest was obtained. COMPARISON: Portable chest x-ray 7/22/2021. There is extensive infiltrate throughout the left upper lobe with partial consolidation and groundglass opacities. There are bullous emphysematous changes in the lung bases, left greater than right. Mild interstitial prominence in the lateral aspect of the right midlung as before. Heart size is normal. No pneumothorax or pleural effusion is identified. No acute osseous findings. Extensive new infiltrate in the left upper lobe compatible with acute pneumonia. Signed by Dr Kelsey Cotto. [place]    CTA PULMONARY W CONTRAST    Result Date: 10/5/2021  Examination. CTA PULMONARY W CONTRAST 10/5/2021 2:44 AM History: Shortness of breath and wheezing. Chronic respiratory failure. DLP: 744 mGycm. The CT angiography of the chest is performed after intravenous contrast enhancement. The images are acquired in axial plane and subsequent 2-D reconstruction in coronal and sagittal planes and 3-D maximum intensity projection reconstruction. The comparison is made with the previous study dated 9/21/2021.  There is good opacification of the pulmonary arteries and branches bilaterally. No filling defects in the opacified pulmonary arterial bed. Atheromatous changes thoracic aorta are seen. There is a persistent mild ectasia of the ascending thoracic aorta which measures 4 cm in AP dimension. No dissection. Atheromatous changes of coronary arteries are noted. The RV/LV ratio could not be optimally evaluated due to position of the heart and incomplete opacification of the left ventricle. There is a persistent extensive mediastinal and hilar lymphadenopathy. A level R4 lymph node measures 17 mm in short axis. It previously measured 15 mm. And right hilar lymph node measures 2.2 cm in short axis. It was not optimally visualized due to lack of contrast enhancement in the previous study. A large subcarinal lymph node measures 2.8 cm in short axis. A left hilar lymph node measures 2.2 cm in short axis. The limited visualized soft tissues of the neck are unremarkable. No mass or lymphadenopathy. There are ill-defined nodules in the thyroid gland, right more than the left. There are persistent extensive chronic interstitial lung disease similar to the previous study. There is superimposed groundglass infiltrate predominantly in the left upper lobe and superior segment of the lower lobes bilaterally which may represent superimposed inflammatory/infectious process. There are extensive chronic emphysematous changes and large bullae similar to the previous study. There are bronchiectatic changes and honeycombing in the lungs bilaterally which is also similar to the previous study. No discrete lung nodules are identified. The limited visualized liver and spleen appear unremarkable. The adrenal glands are normal. A low-density area in the head of the pancreas seen in the previous study is incompletely visualized but appears similar to the previous study. The pancreas kidneys are incompletely included in the study and not evaluated. The images reviewed in bone window show no acute bony abnormality. Chronic degenerative changes of the thoracic spine are noted. Old healed fractures of the right lower ribs posteriorly are seen. No evidence of an acute pulmonary embolism. Persistent chronic interstitial lung changes/fibrosis. There is superimposed acute inflammatory/infectious process as detailed above. Extensive chronic emphysematous minutes lung changes and large bullae bilaterally. Evidence of mediastinal and hilar lymphadenopathy which may have moderately increased in size since the previous study. The above study was initially reviewed and reported by [facility]. I do not find any discrepancies. Signed by Dr Esequiel Sam on chronic respiratory failure with hypoxemia. Continue with supportive care. BiPAP as needed. Pulmonary fibrosis. Complicating #1. Lung bullae. Not a surgical candidate. HCAP (healthcare-associated pneumonia). Continue broad-spectrum antibiotics. Please document 43 minutes of critical care time for patient assessment, chart review, discussion with staff, .       Vitor Minaya DO

## 2021-10-06 NOTE — CONSULTS
**Physician Signature**  This document was electronically signed by: Tushar Levine DO  10/05/2021 11:56   PM    **Consult Information**  Member Facility: [address] [place] [address] MRN: [id number]  Visit/Encounter Number: [id number]  Consult ID: [id number]  Facility Time Zone: CT  Date and Time of Request: 10/05/2021 09:17 PM  CT  Requesting Clinician: DR MATTHEWS  Patient Name: Giana Gomes  YOB: 1945  Gender: Male  Patient identity was confirmed at the beginning of the consult with the   patient/family/staff using two personal identifiers: Patient name and       **Reason for Consult**  Reason for Consult: [facility]    **Admission**  Admission Date: 10-  Chief reason for ICU admission: Pneumonia    **Core Metrics**  General orienting sentence for patient: (10/5)  69 y/o with hx COPD, pulm   fibrosis admitted with pneumonia. On BiPAP . No pressors   currently. Mobilized.   Chief physiologic deterioration: Increase in FiO2 required by   30%  Is the patient on DVT prophylaxis?: Yes  Prophylaxis type: Pharmacological  DVT Prophylaxis Comments: LMWH  Is the patient on GI prophylaxis?: Yes  Gi Prophylaxis Comments: pepcid  Has this patient reached their nutritional goal?: Yes  Nutritional Goals Details: eating  Are there current issues with pain management in this patient?:   No  Are there issues with skin integrity?: No  Are there issues with delirium?: No  Has the patient been mobilized?: Yes  Is this patient currently intubated?: No  Are there ethical or care philosophy or family issues?: No  Do you recommend an in depth evaluation?: No  Disposition Recommendations: Continue ICU level of care    **Physician Signature**  This document was electronically signed by: Tushar Levine DO  10/05/2021 11:56   PM

## 2021-10-06 NOTE — CONSULTS
Palliative Care:    67 y/o man followed in ICU for respiratory failure, pulmonary fibrosis and pneumonia. He is currently on BiPAP, but desats with any activity or conversation. Pulmonary following. Met with pt and his wife at the bedside, encouraged pt to conserve energy and let his wife talk, he agreed, and participated in conversation with nods and thumbs up. Pt has an oxygen concentrator at home and a shower chair. In review of life at home, he has been able to get up and transfer bed to chair, little ambulation. He requires time to recover after any activity. His favorite thing is for his wife to drive him to the bott[place] and let him shoot his gun. He enjoys target practice and being outdoors. 3 adult children who are supportive in his care. Past Medical History:        Past Medical History:   Diagnosis Date    Arthritis     CAD (coronary artery disease) 03/04/2021    Chronic kidney disease     enlarged prostate    Community acquired pneumonia of right lower lobe of lung 07/22/2021    HLD (hyperlipidemia) 07/22/2021    Hypertension     Palliative care patient 10/06/2021       Advance Directives:  Full code at this time. Pt voiced that he does not want intubation, OK with an attempt to restart his heart. No advance directive, but wants to complete a living. We will assist with documents. Pain/Other Symptoms:    Denies pain, air hunger is his significant symptom    Activity:    As tolerated         Psychological/Spiritual:        Good spiritual support from their Protestant family. Plan:    Continue medical treatment and monitoring. Patient/family discussion r/t goals:  Good conversations about goals. Pt has a progressive disease and in review of goals they voiced, \"Just to get back to where we were. \"  We also spoke of future and explained hospice services when needed. They have had family in hospice care and understand their care in the home.   They are in consideration of

## 2021-10-06 NOTE — CONSULTS
Pulmonary and Critical Care Consult Note    [facility] Jostin Pineda    MRN# [id number]    Acct# [de-identified]  10/6/2021   8:42 AM CDT    Referring Morelia Rivas DO      Chief Complaint: shortness of breath. Requesting physician: Dr. Daniel Meaed. Reason for consult: Established patient, respiratory failure      HPI: We have been consulted to see this 68y.o. year old male born on 1945. The patient presented to the hospital yesterday due to increased shortness of breath. He had a CTA that was negative for PE however he did have increased interstitial and alveolar infiltrates bilaterally. He did have a slight leukocytosis. He denies any fever. He had a cough that is mostly nonproductive at home. He was noted to be severely hypoxic. He was admitted to the intensive care unit started empirically on antibiotic therapy for the treatment of possible healthcare associated pneumonia. His MRSA screen was negative. Sputum cultures are pending. He was also started on high-dose steroids. I was asked to see him regarding the above. Currently he is on noninvasive ventilation he continues to be short of breath. He does have history of pulmonary fibrosis most likely idiopathic as he had negative work-up for connective tissue and autoimmune disorder. He also has giant bullous disease bilaterally. We offered him referral to [address] [place] [address] for possible bullectomy knowing that he could be a bad surgical candidate but he declined.       Past Medical History      Past Medical History:   Diagnosis Date    Arthritis     CAD (coronary artery disease) 03/04/2021    Chronic kidney disease     enlarged prostate    Community acquired pneumonia of right lower lobe of lung 07/22/2021    HLD (hyperlipidemia) 07/22/2021    Hypertension     Palliative care patient 10/06/2021 SurgicalHistory  Past Surgical History:   Procedure Laterality Date    CHOLECYSTECTOMY      COLONOSCOPY      ENDOSCOPY, COLON, DIAGNOSTIC      HERNIA REPAIR      JOINT REPLACEMENT Bilateral     SKIN BIOPSY       Allergies  No Known Allergies  Medications    aspirin, 81 mg, Oral, Daily    atorvastatin, 40 mg, Oral, Nightly    clopidogrel, 75 mg, Oral, Daily    finasteride, 5 mg, Oral, Daily    sodium chloride, 250 mL, IntraVENous, Once    famotidine (PEPCID) injection, 20 mg, IntraVENous, BID    enoxaparin, 40 mg, SubCUTAneous, Daily    cefepime, 2,000 mg, IntraVENous, Q8H    budesonide, 0.5 mg, Nebulization, BID **AND** Arformoterol Tartrate, 15 mcg, Nebulization, BID    influenza virus vaccine, 0.5 mL, IntraMUSCular, Prior to discharge    vancomycin (VANCOCIN) intermittent dosing (placeholder), , Other, RX Placeholder    [COMPLETED] vancomycin, 25 mg/kg, IntraVENous, Once **FOLLOWED BY** vancomycin, 1,500 mg, IntraVENous, Q24H    methylPREDNISolone, 60 mg, IntraVENous, Q6H    doxycycline (VIBRAMYCIN) IV, 100 mg, IntraVENous, Q12H    ipratropium-albuterol, 1 ampule, Inhalation, Q4H    insulin lispro, 0-6 Units, SubCUTAneous, TID WC    insulin lispro, 0-3 Units, SubCUTAneous, Nightly    metoprolol tartrate, 100 mg, Oral, BID   Social History   reports that he has quit smoking. His smoking use included cigarettes. He has a 33.00 pack-year smoking history. He has quit using smokeless tobacco.  His smokeless tobacco use included chew. He reports that he does not drink alcohol and does not use drugs. Family History  family history includes Arthritis in his brother and father; Cancer in his brother and mother; Coronary Art Dis in his brother and maternal uncle; Heart Attack in his brother and father; Heart Disease in his brother, father, and maternal uncle; High Blood Pressure in his brother, brother, father, and maternal uncle;  No Known Problems in his maternal aunt, paternal aunt, and paternal uncle; Substance Abuse in his brother. Review of Systems:  12 point review of systems is negative except as below:  CONSTITUTIONAL:  positive for  fatigue and malaise  RESPIRATORY:  positive for  dyspnea and wheezing  CARDIOVASCULAR:  positive for  dyspnea    Physical Exam:  /63   Pulse 95   Temp 97 °F (36.1 °C) (Temporal)   Resp 24   Ht 6' (1.829 m)   Wt 202 lb 1.6 oz (91.7 kg)   SpO2 (!) 88%   BMI 27.41 kg/m²     Intake/Output Summary (Last 24 hours) at 10/6/2021 0842  Last data filed at 10/6/2021 0437  Gross per 24 hour   Intake 1059.5 ml   Output 1800 ml   Net -740.5 ml       General appearance: Elderly male who appears to be in mild respiratory distress. HEENT: Normocephalic atraumatic. Pupils round reactive to light extraocular muscles are intact  Heart: S1-S2 distant sounds no murmurs  Lungs: Diminished bilaterally. Bilateral basal rales.   Abdomen: Soft nontender no organomegalies normal bowel sounds  Extremities: No clubbing cyanosis or edema  Neuro: No focal findings  Skin: Intact        Labs:  Recent Labs     10/05/21  0125 10/06/21  0135   WBC 18.3* 25.1*   RBC 5.07 4.73   HGB 14.9 14.0   HCT 45.5 42.2    191   MCV 89.7 89.2   MCH 29.4 29.6   MCHC 32.7* 33.2   RDW 14.5 14.0      Recent Labs     10/05/21  0125 10/05/21  0131 10/06/21  0135   *  --  136   K 4.3 3.7 4.1     --  103   CO2 22  --  22   BUN 18  --  22   CREATININE 0.9  --  0.8   CALCIUM 9.2  --  9.5   GLUCOSE 176*  --  149*      Recent Labs     10/05/21  0131   PHART 7.410   JPJ3DTK 35.0   PO2ART 50.0*   PDX5IJO 22.2   [id number] 86.7*   BEART -1.8     Recent Labs     10/05/21  0125 10/05/21  0125 10/05/21  0148 10/06/21  0135   AST 15   < >  --  18   ALT 12   < >  --  10   ALKPHOS 92   < >  --  76   BILITOT 0.8   < >  --  0.8   MG  --   --   --  1.9   CALCIUM 9.2   < >  --  9.5   PROBNP 439  --   --   --    TROPONINI <0.01  --   --   --    LACTA  --   --  1.1  --     < > = values in this interval wife.  2. Possible pneumonia. Continue IV antibiotic therapy empirically. 3. Mediastinal and hilar adenopathy that is more prominent and visible on the current CT than prior studies. Likely etiology could be inflammatory such as sarcoidosis versus infectious versus malignancy which seems to be less likely in the absence of a dominant lung mass. He is not a candidate for invasive work-up at this time. 4. Giant bullous disease with severe COPD continue bronchodilators pulmonary toilet. 5. Interstitial fibrosis likely secondary to inflammatory process such as sarcoid however his connective tissue disease work-up was negative. 6. His condition is guarded. Critical care time 36 min       Jessica Graham MD, [facility], [facility]    The above note was generated using voice recognition software. Inadvertent typographical errors in transcription may have occurred.     Electronically signed by Keiry Curran MD on 10/06/21 at 8:42 AM

## 2021-10-07 PROBLEM — J96.21 ACUTE ON CHRONIC RESPIRATORY FAILURE WITH HYPOXIA (HCC): Status: ACTIVE | Noted: 2021-01-01

## 2021-10-07 NOTE — PROGRESS NOTES
10/7/2021  4:13 AM - Monica Recio Incoming Lab Results From [facility] Value Ref Range & Units Status Collected Lab   pH, Arterial 7.410  7.350 - 7.450 Final 10/07/2021  4:12 AM [facility]   pCO2, Arterial 38.0  35.0 - 45.0 mmHg Final 10/07/2021  4:12 AM [facility]   pO2, Arterial 56. 0Low   80.0 - 100.0 mmHg Final 10/07/2021  4:12 AM [facility]   HCO3, Arterial 24.1  22.0 - 26.0 mmol/L Final 10/07/2021  4:12 AM [facility] Excess, Arterial -0.3  -2.0 - 2.0 mmol/L Final 10/07/2021  4:12 AM [facility]   Hemoglobin, Art, Extended 14.8  14.0 - 18.0 g/dL Final 10/07/2021  4:12 AM [facility]   O2 Sat, Arterial 89.3Low   >92 % Final 10/07/2021  4:12 AM [facility]   Carboxyhgb, Arterial 2.0  0.0 - 5.0 % Final 10/07/2021  4:12 AM [facility]        0.0-1.5   (Smokers 1.5-5.0)    Methemoglobin, Arterial 0.8  <1.5 % Final 10/07/2021  4:12 AM [facility]   O2 Content, Arterial 18.5  Not Established mL/dL Final 10/07/2021  4:12 AM [facility]     Pt on BiPAP 14/6 30 lpm Fi02  resp rate 26  Right radial puncture AT+

## 2021-10-07 NOTE — DISCHARGE SUMMARY
Discharge Summary    Jayson Johnson  :  1945  MRN:  [id number]    Admit date:  10/5/2021  Discharge date:  10/7/2021    Admitting Physician:  Lisa Leon MD    Advance Directive: [facility]    Consults: pulmonary/intensive care and hospice    Primary Care Physician:  Catarino Everett MD    Discharge Diagnoses:  Principal Problem:    Acute on chronic respiratory failure with hypoxemia [facility])  Active Problems:    CAD (coronary artery disease)    Hypertension    Acute respiratory failure with hypoxia ([address]    HLD (hyperlipidemia)    BPH (benign prostatic hyperplasia)    Pulmonary fibrosis ([address]    Lung bullae ([address]    BENEDICT (dyspnea on exertion)    Wheezing    Chronic respiratory failure with hypoxia (HCC)    Shortness of breath    HCAP (healthcare-associated pneumonia)    Palliative care patient  Resolved Problems:    * No resolved hospital problems. *      Significant Diagnostic Studies:   XR CHEST PORTABLE    Result Date: 10/7/2021  EXAMINATION:  XR CHEST PORTABLE  10/7/2021 8:55 AM HISTORY: Acute on chronic respiratory failure with hypoxemia. Bilateral infiltrate. COMPARISON: 10/6/2021. FINDINGS:  There are moderate bilateral infiltrates with relative sparing at the left lung base. Heart size is upper limits of normal. There is mediastinal widening consistent with mediastinal adenopathy. No acute bony abnormality is seen. 1. Bilateral infiltrates. No significant change. 2. Mediastinal lymphadenopathy. Signed by Dr Mariella Garcia      CBC:   Recent Labs     10/05/21  0125 10/06/21  0135 10/07/21  0136   WBC 18.3* 25.1* 31.8*   HGB 14.9 14.0 14.9    191 268     BMP:    Recent Labs     10/05/21  0125 10/05/21  0131 10/06/21  0135 10/07/21  0136 10/07/21  0412   *  --  136 139  --    K 4.3   < > 4.1 5.2* 4.3     --  103 101  --    CO2 22  --  22 24  --    BUN 18  --  22 30*  --    CREATININE 0.9  --  0.8 0.9  --    GLUCOSE 176*  --  149* 158*  --     < > = values in this interval not displayed. INR: No results for input(s): INR in the last 72 hours. Lipids: No results for input(s): CHOL, HDL in the last 72 hours. Invalid input(s): LDLCALCU  ABGs:  Recent Labs     10/05/21  0131 10/07/21  0412   PHART 7.410 7.410   XKW9MJP 35.0 38.0   PO2ART 50.0* 56.0*   UQW9JMY 22.2 24.1   BEART -1.8 -0.3   HGBAE 15.1 14.8   S7REHUMZ 86.7* 89.3*   CARBOXHGBART 3.0 2.0   02THERAPY Unknown Unknown     HgBA1c:    Recent Labs     10/05/21  0125   LABA1C 5.7       Procedures: None  Hospital Course: Mr. Ro Coffman was admitted on 10/5 for respiratory distress. He has a history of COPD, bullous lung disease, and pulmonary fibrosis. He was admitted to the ICU. Consultation was obtained with pulmonology. Unfortunately he is at the point of very advanced lung disease. He did not wish to pursue intubation or any heroic measures. Consultation was obtained with hospice and he was transferred to inpatient hospice on the morning of 10/7. Physical Exam:  Vital Signs: BP (!) 146/86   Pulse 112   Temp 98 °F (36.7 °C) (Temporal)   Resp 28   Ht 6' (1.829 m)   Wt 198 lb 1.6 oz (89.9 kg)   SpO2 91%   BMI 26.87 kg/m²   General appearance:. Alert and Cooperative   HEENT: Normocephalic. Chest: clear to auscultation bilaterally without wheezes or rhonchi. Cardiac: Normal heart tones with regular rate and rhythm, S1, S2 normal. No murmurs, gallops, or rubs auscultated. Abdomen:soft, non-tender; normal bowel sounds, no masses, no organomegaly. Extremities: No clubbing or cyanosis. No peripheral edema. Peripheral pulses palpable. Neurologic: Grossly intact.     Discharge Medications:       [facility] Medication Instructions [id number]    Printed on:10/07/21 1200   Medication Information                      aspirin 81 MG chewable tablet  Take 1 tablet by mouth daily             atorvastatin (LIPITOR) 40 MG tablet  Take 1 tablet by mouth nightly             clopidogrel (PLAVIX) 75 MG tablet  Take 1 tablet by mouth daily             finasteride (PROSCAR) 5 MG tablet  Take 5 mg by mouth daily             isosorbide mononitrate (IMDUR) 60 MG extended release tablet  Take 1 tablet by mouth 2 times daily             metoprolol (LOPRESSOR) 100 MG tablet  Take 1 tablet by mouth 2 times daily             Nintedanib Esylate (OFEV) 150 MG CAPS  Take 150 mg by mouth 2 times daily             nitroGLYCERIN (NITROSTAT) 0.4 MG SL tablet  up to max of 3 total doses. If no relief after 1 dose, call 911. Disposition: Patient is medically stable and was transferred to inpatient hospice. Time spent on discharge 40 minutes.     Signed:  Gina Durham DO

## 2021-10-07 NOTE — CONSULTS
**Physician Signature**  This document was electronically signed by: Naa Harding MD  10/06/2021   10:22 PM    **Consult Information**  Member Facility: [address] [place] [address] MRN: [id number]  Visit/Encounter Number: [id number]  Consult ID: [id number]  Facility Time Zone: CT  Date and Time of Request: 10/06/2021 05:37 PM  CT  Requesting Clinician: DR MATTHEWS  Patient Name: Ivonne Alba  YOB: 1945  Gender: Male  Patient identity was confirmed at the beginning of the consult with the   patient/family/staff using two personal identifiers: Patient name and       **Reason for Consult**  Reason for Consult: [facility]    **Admission**  Admission Date: 10-  Chief reason for ICU admission: Pneumonia    **Core Metrics**  General orienting sentence for patient: (10/5)  67 y/o with hx COPD, pulm   fibrosis admitted with pneumonia. On BiPAP 14/6. No pressors current PM:    HFNC 70 LPM at 90%, Left upper lobe pneumonia. Mobilized. on 70 lpm, 100%   HFNC, now placed back on BIPAP 14/6 with 95 liters. Getting   steroids, and abx.  Palliative care consult now morphine helping with air   hunger  Chief physiologic deterioration: Increase in FiO2 required by   30%  Is the patient on DVT prophylaxis?: Yes  Prophylaxis type: Pharmacological  DVT Prophylaxis Comments: LMWH  Is the patient on GI prophylaxis?: Yes  Gi Prophylaxis Comments: pepcid  Has this patient reached their nutritional goal?: No and issues are being   addressed  Nutritional Goals Details: NPO while BIPAP  Are there current issues with pain management in this patient?:   No  Are there issues with skin integrity?: No  Are there issues with delirium?: No  Has the patient been mobilized?: Yes  Is this patient currently intubated?: No  Are there ethical or care philosophy or family issues?: No  Do you recommend an in depth evaluation?: No  Disposition Recommendations: Continue ICU level of care    **Physician Signature**  This document was electronically signed by: Ru Jama MD  10/06/2021   10:22 PM

## 2021-10-08 PROBLEM — J44.9 PULMONARY HYPERTENSION DUE TO COPD (HCC): Status: ACTIVE | Noted: 2021-01-01

## 2021-10-08 PROBLEM — I27.23 PULMONARY HYPERTENSION DUE TO COPD (HCC): Status: ACTIVE | Noted: 2021-01-01

## 2021-10-08 PROBLEM — N17.9 ACUTE RENAL FAILURE (ARF) (HCC): Status: ACTIVE | Noted: 2021-01-01

## 2021-10-15 ASSESSMENT — ENCOUNTER SYMPTOMS
COUGH: 1
ABDOMINAL DISTENTION: 0
VOMITING: 0
ABDOMINAL PAIN: 0
NAUSEA: 0
SHORTNESS OF BREATH: 1
DIARRHEA: 0

## 2021-10-27 NOTE — PROGRESS NOTES
Physician Progress Note      Leighann Watts  CSN #:                  [id number]  :                       1945  ADMIT DATE:       10/5/2021 1:22 AM  DISCH DATE:        10/7/2021 11:11 AM  RESPONDING  PROVIDER #:        Loyd Jordan [address] [facility]          QUERY TEXT:    The patient was diagnosed with pneumonia and treated with IV Cefepime and   Doxycycline. On admission, the patient had WBC 18.3 and procalcitonin 0.63   within 24 hours of admission. Based on clinical?indicators?and treatment,?can   the patient's condition be further? specified?as:    The medical record reflects the following:  Risk Factors: Pneumonia  Clinical Indicators: WBC 18.3 and Procalcitonin 0.63 within 24 hours of   admission. Treatment: Maxipime 2g IV q 8 hr., Vibramycin 100 mg IV q 12 hr., Vanco 2,250   mg x1  Options provided:  -- Sepsis due to pneumonia  -- Pneumonia only  -- Other - I will add my own diagnosis  -- Disagree - Not applicable / Not valid  -- Disagree - Clinically unable to determine / Unknown  -- Refer to Clinical Documentation Reviewer    PROVIDER RESPONSE TEXT:    This patient has sepsis due to pneumonia.     Query created by: Viet Shell on 10/19/2021 9:50 AM      Electronically signed by:  Tanvi Rocha DO 10/27/2021 9:16 AM

## 2024-05-10 NOTE — PROGRESS NOTES
4 Eyes Skin Assessment    Iesha Riley is being assessed upon: Admission    I agree that I, Sowmya Jones, along with Hunter Barber RN (either 2 RN's or 1 LPN and 1 RN) have performed a thorough Head to Toe Skin Assessment on the patient. ALL assessment sites listed below have been assessed. Areas assessed by both nurses:     [x]   Head, Face, and Ears   [x]   Shoulders, Back, and Chest  [x]   Arms, Elbows, and Hands   [x]   Coccyx, Sacrum, and Ischium  [x]   Legs, Feet, and Heels    Does the Patient have Skin Breakdown?  No    Bhanu Prevention initiated: Yes  Wound Care Orders initiated: NA    [facility] nurse consulted for Pressure Injury (Stage 3,4, Unstageable, DTI, NWPT, and Complex wounds) and New or Established Ostomies: NA        Primary Nurse eSignature: Sowmya Jones RN on 11/13/2020 at 10:50 AM      Co-Signer eSignature: Electronically signed by Shoshana Heard RN on 11/13/20 at 3:05 PM CST Continuous Pulse Oximetry